# Patient Record
Sex: FEMALE | Race: WHITE | NOT HISPANIC OR LATINO | Employment: OTHER | URBAN - METROPOLITAN AREA
[De-identification: names, ages, dates, MRNs, and addresses within clinical notes are randomized per-mention and may not be internally consistent; named-entity substitution may affect disease eponyms.]

---

## 2022-04-25 NOTE — PROGRESS NOTES
4/27/2022      Chief Complaint   Patient presents with    Urinary Tract Infection     unable to void went before they came      Assessment and Plan    64 y o  female new patient     1  Recurrent UTIs  - 1 positive urine culture on file over the past year growing E Coli  - Recommend proper hydration, avoiding constipation, cranberry supplementation, and probiotics prevent UTIs  - Will start topical vaginal estrogen cream  - Standing urine culture orders placed  - Obtain US kidney/bladder  - If ongoing recurrent UTIs consider Hiprex versus daily low-dose antibiotic  2  Neurogenic bladder   3  Multiple sclerosis  - recommend avoiding bladder irritants, bowel regimen to prevent constipation, and Kegel exercises  - consider trial of Myrbetriq or alternative anticholinergic if bothersome symptoms   - Bladder scan today was 15 mL     History of Present Illness  Remington Preciado is a 64 y o  female here for new patient evaluation of recurrent UTIs  Per chart review she has only had 1 positive urine culture growing E coli over the past year  She has had at least 2 UTIs this year per her report  She recently just finished course of Macrobid for UTI  She reports resolution of her UTI symptoms which are typically dysuria  She denies ever being hospitalized for urinary tract infection  She has history of pulmonary embolism, psoriasis, neurogenic bladder, multiple sclerosis, hyperlipidemia, lung cancer, trigeminal neuralgia, gait dysfunction  She previously was managed on oxybutynin for urgency and frequency symptoms which are believed to be due to neurogenic bladder secondary to her multiple sclerosis  She reports the oxybutynin was ineffective  Patient reports she will occasionally have intermittent urinary urgency and frequency however this is not a daily occurrence  Occasionally she will have leakage associated with the urgency  She does have chronic constipation issues    She previously had seen urologist several years ago and underwent cystoscopy which was negative per her report  She denies any prior  surgical manipulation  Denies any family history of  malignancy  She is a former smoker  Review of Systems   Constitutional: Negative for chills and fever  Respiratory: Negative for shortness of breath  Cardiovascular: Negative for chest pain  Gastrointestinal: Positive for constipation  Negative for abdominal pain  Genitourinary: Positive for frequency and urgency  Negative for difficulty urinating, dysuria, flank pain, hematuria and pelvic pain  Neurological: Negative for dizziness  Past Medical History  Past Medical History:   Diagnosis Date    [facility])     only has the left lung     [facility]        Past Social History  Past Surgical History:   Procedure Laterality Date    LUNG REMOVAL, TOTAL      right      Social History     Tobacco Use   Smoking Status Former Smoker   Smokeless Tobacco Never Used       Past Family History  Family History   Problem Relation Age of Onset    No Known Problems Father     No Known Problems Mother        Past Social history  Social History     Socioeconomic History    Marital status:       Spouse name: Not on file    Number of children: Not on file    Years of education: Not on file    Highest education level: Not on file   Occupational History    Not on file   Tobacco Use    Smoking status: Former Smoker    Smokeless tobacco: Never Used   Vaping Use    Vaping Use: Never used   Substance and Sexual Activity    Alcohol use: Not Currently    Drug use: Not on file    Sexual activity: Not on file   Other Topics Concern    Not on file   Social History Narrative    Not on file     Social Determinants of Health     Financial Resource Strain: Not on file   Food Insecurity: Not on file   Transportation Needs: Not on file   Physical Activity: Not on file   Stress: Not on file   Social Connections: Not on file Intimate Partner Violence: Not on file   Housing Stability: Not on file       Current Medications  Current Outpatient Medications   Medication Sig Dispense Refill    albuterol (PROVENTIL HFA,VENTOLIN HFA) 90 mcg/act inhaler Inhale 2 puffs every 6 (six) hours as needed      aspirin (ECOTRIN LOW STRENGTH) 81 mg EC tablet Take 81 mg by mouth daily      carBAMazepine (TEGretol) 200 mg tablet Take 1 tablet by mouth 2 (two) times a day      Cholecalciferol 50 MCG (2000 UT) CAPS Take 2 capsules by mouth daily      cyanocobalamin (VITAMIN B-12) 100 MCG tablet Take 100 mcg by mouth daily      betamethasone, augmented, (DIPROLENE) 0 05 % ointment as needed  (Patient not taking: Reported on 4/27/2022)      nitrofurantoin (MACROBID) 100 mg capsule Take 100 mg by mouth 2 (two) times a day (Patient not taking: Reported on 4/27/2022 )       No current facility-administered medications for this visit  Allergies  No Known Allergies      The following portions of the patient's history were reviewed and updated as appropriate: allergies, current medications, past medical history, past social history, past surgical history and problem list       Vitals  Vitals:    04/27/22 1400   BP: 120/78   BP Location: Left arm   Patient Position: Sitting   Cuff Size: Large   Pulse: 72   Resp: 20   SpO2: 98%   Weight: 65 9 kg (145 lb 3 2 oz)   Height: 5' 5" (1 651 m)           Physical Exam  Physical Exam  Constitutional:       Appearance: Normal appearance  She is normal weight  HENT:      Head: Normocephalic and atraumatic  Right Ear: External ear normal       Left Ear: External ear normal    Eyes:      General: No scleral icterus  Conjunctiva/sclera: Conjunctivae normal    Cardiovascular:      Pulses: Normal pulses  Pulmonary:      Effort: Pulmonary effort is normal    Musculoskeletal:      Cervical back: Normal range of motion  Comments: Ambulates with cane   Neurological:      General: No focal deficit present  Mental Status: She is alert and oriented to person, place, and time  Psychiatric:         Mood and Affect: Mood normal          Behavior: Behavior normal          Thought Content:  Thought content normal          Judgment: Judgment normal            Results  Recent Results (from the past 1 hour(s))   POCT Measure PVR    Collection Time: 04/27/22  2:06 PM   Result Value Ref Range    POST-VOID RESIDUAL VOLUME, ML POC 15 mL   ]  No results found for: PSA  No results found for: GLUCOSE, CALCIUM, NA, K, CO2, CL, BUN, CREATININE  No results found for: WBC, HGB, HCT, MCV, PLT        Orders  Orders Placed This Encounter   Procedures    POCT Measure PVR       Hilaria Mercer PA-C

## 2022-04-27 ENCOUNTER — OFFICE VISIT (OUTPATIENT)
Dept: UROLOGY | Facility: CLINIC | Age: 77
End: 2022-04-27
Payer: COMMERCIAL

## 2022-04-27 VITALS
DIASTOLIC BLOOD PRESSURE: 78 MMHG | RESPIRATION RATE: 20 BRPM | WEIGHT: 145.2 LBS | SYSTOLIC BLOOD PRESSURE: 120 MMHG | BODY MASS INDEX: 24.19 KG/M2 | HEART RATE: 72 BPM | OXYGEN SATURATION: 98 % | HEIGHT: 65 IN

## 2022-04-27 DIAGNOSIS — N39.0 URINARY TRACT INFECTION WITHOUT HEMATURIA, SITE UNSPECIFIED: Primary | ICD-10-CM

## 2022-04-27 DIAGNOSIS — N31.9 NEUROGENIC BLADDER: ICD-10-CM

## 2022-04-27 DIAGNOSIS — N39.0 FREQUENT UTI: ICD-10-CM

## 2022-04-27 LAB — POST-VOID RESIDUAL VOLUME, ML POC: 15 ML

## 2022-04-27 PROCEDURE — 51798 US URINE CAPACITY MEASURE: CPT | Performed by: PHYSICIAN ASSISTANT

## 2022-04-27 PROCEDURE — 99204 OFFICE O/P NEW MOD 45 MIN: CPT | Performed by: PHYSICIAN ASSISTANT

## 2022-04-27 RX ORDER — ASPIRIN 81 MG/1
81 TABLET ORAL DAILY
COMMUNITY

## 2022-04-27 RX ORDER — NITROFURANTOIN 25; 75 MG/1; MG/1
100 CAPSULE ORAL 2 TIMES DAILY
COMMUNITY
Start: 2022-04-22 | End: 2022-04-27

## 2022-04-27 RX ORDER — CARBAMAZEPINE 200 MG/1
1 TABLET ORAL 2 TIMES DAILY
COMMUNITY
Start: 2022-03-11

## 2022-04-27 RX ORDER — ESTRADIOL 0.1 MG/G
CREAM VAGINAL
Qty: 42.5 G | Refills: 2 | Status: SHIPPED | OUTPATIENT
Start: 2022-04-27

## 2022-04-27 RX ORDER — ALBUTEROL SULFATE 90 UG/1
2 AEROSOL, METERED RESPIRATORY (INHALATION) EVERY 6 HOURS PRN
COMMUNITY
Start: 2022-03-11

## 2022-04-27 RX ORDER — BETAMETHASONE DIPROPIONATE 0.5 MG/G
OINTMENT TOPICAL
COMMUNITY

## 2022-06-14 ENCOUNTER — APPOINTMENT (OUTPATIENT)
Dept: LAB | Facility: HOSPITAL | Age: 77
End: 2022-06-14
Payer: COMMERCIAL

## 2022-06-14 PROCEDURE — 87086 URINE CULTURE/COLONY COUNT: CPT | Performed by: PHYSICIAN ASSISTANT

## 2022-06-15 DIAGNOSIS — N39.0 URINARY TRACT INFECTION WITHOUT HEMATURIA, SITE UNSPECIFIED: Primary | ICD-10-CM

## 2022-06-15 RX ORDER — CEPHALEXIN 500 MG/1
500 CAPSULE ORAL EVERY 12 HOURS SCHEDULED
Qty: 14 CAPSULE | Refills: 0 | Status: SHIPPED | OUTPATIENT
Start: 2022-06-15 | End: 2022-06-22

## 2022-06-16 ENCOUNTER — TELEPHONE (OUTPATIENT)
Dept: UROLOGY | Facility: CLINIC | Age: 77
End: 2022-06-16

## 2022-06-16 LAB — BACTERIA UR CULT: NORMAL

## 2022-06-16 NOTE — TELEPHONE ENCOUNTER
----- Message from Jeffrey Tilley PA-C sent at 6/16/2022 11:18 AM EDT -----  Final urine cultures only showing mixed contaminants  Typically we do not treat this with antibiotics  If she is highly symptomatic she can take antibiotics  Otherwise I would not utilize this prescription

## 2022-06-16 NOTE — TELEPHONE ENCOUNTER
CC not in chart   Left generic VM     When patient returns call inform her antibiotics have been sent to her pharmacy     Office to monitor for final cultures

## 2022-06-16 NOTE — TELEPHONE ENCOUNTER
Spoke with patient, advised results note  Patient states she is symptomatic and will  antibiotics  Asked her to keep office updated early next week on her symptoms  She verbalized understanding

## 2022-06-16 NOTE — TELEPHONE ENCOUNTER
----- Message from Kasia Sol PA-C sent at 6/15/2022  5:04 PM EDT -----  Preliminary culture positive  Keflex sent to pharmacy  Will monitor for final culture results and may need to alter antibiotic based off of sensitivities

## 2022-06-20 ENCOUNTER — HOSPITAL ENCOUNTER (EMERGENCY)
Facility: HOSPITAL | Age: 77
Discharge: HOME | End: 2022-06-20
Attending: EMERGENCY MEDICINE
Payer: COMMERCIAL

## 2022-06-20 ENCOUNTER — APPOINTMENT (EMERGENCY)
Dept: CT IMAGING | Facility: HOSPITAL | Age: 77
End: 2022-06-20
Payer: COMMERCIAL

## 2022-06-20 VITALS
HEART RATE: 92 BPM | SYSTOLIC BLOOD PRESSURE: 173 MMHG | DIASTOLIC BLOOD PRESSURE: 72 MMHG | RESPIRATION RATE: 26 BRPM | TEMPERATURE: 97.7 F | OXYGEN SATURATION: 97 %

## 2022-06-20 DIAGNOSIS — M54.9 BACK PAIN: Primary | ICD-10-CM

## 2022-06-20 LAB
2HR DELTA HS TROPONIN: 4 NG/L
ALBUMIN SERPL BCP-MCNC: 3.3 G/DL (ref 3.5–5)
ALP SERPL-CCNC: 90 U/L (ref 46–116)
ALT SERPL W P-5'-P-CCNC: 12 U/L (ref 12–78)
ANION GAP SERPL CALCULATED.3IONS-SCNC: 8 MMOL/L (ref 4–13)
AST SERPL W P-5'-P-CCNC: 12 U/L (ref 5–45)
ATRIAL RATE: 85 BPM
BASOPHILS # BLD AUTO: 0.05 THOUSANDS/ΜL (ref 0–0.1)
BASOPHILS NFR BLD AUTO: 1 % (ref 0–1)
BILIRUB SERPL-MCNC: 0.39 MG/DL (ref 0.2–1)
BUN SERPL-MCNC: 18 MG/DL (ref 5–25)
CALCIUM ALBUM COR SERPL-MCNC: 9.2 MG/DL (ref 8.3–10.1)
CALCIUM SERPL-MCNC: 8.6 MG/DL (ref 8.3–10.1)
CARDIAC TROPONIN I PNL SERPL HS: 10 NG/L
CARDIAC TROPONIN I PNL SERPL HS: 6 NG/L
CHLORIDE SERPL-SCNC: 105 MMOL/L (ref 100–108)
CO2 SERPL-SCNC: 30 MMOL/L (ref 21–32)
CREAT SERPL-MCNC: 0.86 MG/DL (ref 0.6–1.3)
EOSINOPHIL # BLD AUTO: 0.15 THOUSAND/ΜL (ref 0–0.61)
EOSINOPHIL NFR BLD AUTO: 2 % (ref 0–6)
ERYTHROCYTE [DISTWIDTH] IN BLOOD BY AUTOMATED COUNT: 14.2 % (ref 11.6–15.1)
GFR SERPL CREATININE-BSD FRML MDRD: 64 ML/MIN/1.73SQ M
GLUCOSE SERPL-MCNC: 91 MG/DL (ref 65–140)
HCT VFR BLD AUTO: 36.4 % (ref 34.8–46.1)
HGB BLD-MCNC: 11.9 G/DL (ref 11.5–15.4)
IMM GRANULOCYTES # BLD AUTO: 0.02 THOUSAND/UL (ref 0–0.2)
IMM GRANULOCYTES NFR BLD AUTO: 0 % (ref 0–2)
LIPASE SERPL-CCNC: 150 U/L (ref 73–393)
LYMPHOCYTES # BLD AUTO: 1.19 THOUSANDS/ΜL (ref 0.6–4.47)
LYMPHOCYTES NFR BLD AUTO: 18 % (ref 14–44)
MCH RBC QN AUTO: 31.9 PG (ref 26.8–34.3)
MCHC RBC AUTO-ENTMCNC: 32.7 G/DL (ref 31.4–37.4)
MCV RBC AUTO: 98 FL (ref 82–98)
MONOCYTES # BLD AUTO: 0.48 THOUSAND/ΜL (ref 0.17–1.22)
MONOCYTES NFR BLD AUTO: 7 % (ref 4–12)
NEUTROPHILS # BLD AUTO: 4.56 THOUSANDS/ΜL (ref 1.85–7.62)
NEUTS SEG NFR BLD AUTO: 72 % (ref 43–75)
NRBC BLD AUTO-RTO: 0 /100 WBCS
P AXIS: 60 DEGREES
PLATELET # BLD AUTO: 237 THOUSANDS/UL (ref 149–390)
PMV BLD AUTO: 9.6 FL (ref 8.9–12.7)
POTASSIUM SERPL-SCNC: 3.5 MMOL/L (ref 3.5–5.3)
PR INTERVAL: 168 MS
PROT SERPL-MCNC: 6.9 G/DL (ref 6.4–8.2)
QRS AXIS: 65 DEGREES
QRSD INTERVAL: 80 MS
QT INTERVAL: 364 MS
QTC INTERVAL: 433 MS
RBC # BLD AUTO: 3.73 MILLION/UL (ref 3.81–5.12)
SODIUM SERPL-SCNC: 143 MMOL/L (ref 136–145)
T WAVE AXIS: 67 DEGREES
VENTRICULAR RATE: 85 BPM
WBC # BLD AUTO: 6.45 THOUSAND/UL (ref 4.31–10.16)

## 2022-06-20 PROCEDURE — 99285 EMERGENCY DEPT VISIT HI MDM: CPT | Performed by: EMERGENCY MEDICINE

## 2022-06-20 PROCEDURE — 36415 COLL VENOUS BLD VENIPUNCTURE: CPT | Performed by: EMERGENCY MEDICINE

## 2022-06-20 PROCEDURE — 80053 COMPREHEN METABOLIC PANEL: CPT | Performed by: EMERGENCY MEDICINE

## 2022-06-20 PROCEDURE — 93010 ELECTROCARDIOGRAM REPORT: CPT | Performed by: INTERNAL MEDICINE

## 2022-06-20 PROCEDURE — 99284 EMERGENCY DEPT VISIT MOD MDM: CPT

## 2022-06-20 PROCEDURE — 85025 COMPLETE CBC W/AUTO DIFF WBC: CPT | Performed by: EMERGENCY MEDICINE

## 2022-06-20 PROCEDURE — 84484 ASSAY OF TROPONIN QUANT: CPT | Performed by: EMERGENCY MEDICINE

## 2022-06-20 PROCEDURE — 93005 ELECTROCARDIOGRAM TRACING: CPT

## 2022-06-20 PROCEDURE — 83690 ASSAY OF LIPASE: CPT | Performed by: EMERGENCY MEDICINE

## 2022-06-20 PROCEDURE — 71250 CT THORAX DX C-: CPT

## 2022-06-20 NOTE — DISCHARGE INSTRUCTIONS
Follow up with your doctor for recheck, take tylenol for pain and return to ED for worsening   Follow up with primary care for left sided lung nodule as you may need a repeat CT scan in 12 months

## 2022-06-20 NOTE — ED PROVIDER NOTES
History  Chief Complaint   Patient presents with    Back Pain     Pt c/o upper right back pain since yesterday   Denies injury  Unrelieved by OTC medications  HPI   10-year-old female with past medical history of lung cancer status post right pneumonectomy presents with right upper back pain that started yesterday  Patient was helping move garden tools yesterday  She states that pain is sharp, lasts a few seconds to minutes and then resolves  Nothing makes better or worse  No chest pain or shortness of breath  No cough, fevers or chills  Prior to Admission Medications   Prescriptions Last Dose Informant Patient Reported? Taking? Cholecalciferol 50 MCG (2000 UT) CAPS   Yes No   Sig: Take 2 capsules by mouth daily   albuterol (PROVENTIL HFA,VENTOLIN HFA) 90 mcg/act inhaler   Yes No   Sig: Inhale 2 puffs every 6 (six) hours as needed   aspirin (ECOTRIN LOW STRENGTH) 81 mg EC tablet   Yes No   Sig: Take 81 mg by mouth daily   betamethasone, augmented, (DIPROLENE) 0 05 % ointment   Yes No   Sig: as needed     Patient not taking: Reported on 4/27/2022   carBAMazepine (TEGretol) 200 mg tablet   Yes No   Sig: Take 1 tablet by mouth 2 (two) times a day   cephalexin (KEFLEX) 500 mg capsule   No No   Sig: Take 1 capsule (500 mg total) by mouth every 12 (twelve) hours for 7 days   cyanocobalamin (VITAMIN B-12) 100 MCG tablet   Yes No   Sig: Take 100 mcg by mouth daily   estradiol (ESTRACE VAGINAL) 0 1 mg/g vaginal cream   No No   Sig: Apply pea sized amount around urethra and inside vaginal opening 3 times per week      Facility-Administered Medications: None       Past Medical History:   Diagnosis Date    [facility]     only has the left lung     [facility] sclerosis) ([facility] )        Past Surgical History:   Procedure Laterality Date    LUNG REMOVAL, TOTAL      right        Family History   Problem Relation Age of Onset    No Known Problems Father     No Known Problems Mother      I have reviewed and agree with the history as documented  E-Cigarette/Vaping    E-Cigarette Use Never User      E-Cigarette/Vaping Substances    Nicotine No     THC No     CBD No     Flavoring No     Other No     Unknown No      Social History     Tobacco Use    Smoking status: Former Smoker    Smokeless tobacco: Never Used   Vaping Use    Vaping Use: Never used   Substance Use Topics    Alcohol use: Not Currently       Review of Systems   Constitutional: Negative for chills and fever  HENT: Negative for dental problem and ear pain  Eyes: Negative for pain and redness  Respiratory: Negative for cough and shortness of breath  Cardiovascular: Negative for chest pain and palpitations  Gastrointestinal: Negative for abdominal pain and nausea  Endocrine: Negative for polydipsia and polyphagia  Genitourinary: Negative for dysuria and frequency  Musculoskeletal: Positive for back pain  Negative for arthralgias and joint swelling  Skin: Negative for color change and rash  Neurological: Negative for dizziness and headaches  Psychiatric/Behavioral: Negative for behavioral problems and confusion  All other systems reviewed and are negative  Physical Exam  Physical Exam  Vitals and nursing note reviewed  Constitutional:       General: She is not in acute distress  Appearance: She is well-developed  She is not diaphoretic  HENT:      Head: Atraumatic  Right Ear: External ear normal       Left Ear: External ear normal       Nose: Nose normal    Eyes:      Conjunctiva/sclera: Conjunctivae normal       Pupils: Pupils are equal, round, and reactive to light  Neck:      Vascular: No JVD  Cardiovascular:      Rate and Rhythm: Normal rate and regular rhythm  Heart sounds: Normal heart sounds  No murmur heard  Pulmonary:      Effort: Pulmonary effort is normal  No respiratory distress  Breath sounds: No wheezing        Comments: Absent breath sounds on right  Abdominal:      General: Bowel sounds are normal  There is no distension  Palpations: Abdomen is soft  Tenderness: There is no abdominal tenderness  Musculoskeletal:         General: Normal range of motion  Cervical back: Normal range of motion and neck supple  Comments: Tender to palpation right paraspinal thoracic   Skin:     General: Skin is warm and dry  Capillary Refill: Capillary refill takes less than 2 seconds  Neurological:      Mental Status: She is alert and oriented to person, place, and time  Cranial Nerves: No cranial nerve deficit     Psychiatric:         Behavior: Behavior normal          Vital Signs  ED Triage Vitals [06/20/22 0650]   Temperature Pulse Respirations Blood Pressure SpO2   97 7 °F (36 5 °C) 91 16 (!) 192/89 98 %      Temp Source Heart Rate Source Patient Position - Orthostatic VS BP Location FiO2 (%)   Oral Monitor Sitting Left arm --      Pain Score       --           Vitals:    06/20/22 0853 06/20/22 0900 06/20/22 1100 06/20/22 1115   BP: 149/70 146/71 (!) 173/72 (!) 173/72   Pulse: 88 87 92    Patient Position - Orthostatic VS: Sitting  Sitting Sitting         Visual Acuity      ED Medications  Medications - No data to display    Diagnostic Studies  Results Reviewed     Procedure Component Value Units Date/Time    HS Troponin I 2hr [[id number]]  (Normal) Collected: 06/20/22 1000    Lab Status: Final result Specimen: Blood from Arm, Left Updated: 06/20/22 1034     hs TnI 2hr 10 ng/L      Delta 2hr hsTnI 4 ng/L     HS Troponin 0hr (reflex protocol) [[id number]]  (Normal) Collected: 06/20/22 0723    Lab Status: Final result Specimen: Blood from Arm, Left Updated: 06/20/22 0809     hs TnI 0hr 6 ng/L     Lipase [[id number]]  (Normal) Collected: 06/20/22 0723    Lab Status: Final result Specimen: Blood from Arm, Left Updated: 06/20/22 0755     Lipase 150 u/L     Comprehensive metabolic panel [[id number]]  (Abnormal) Collected: 06/20/22 0723    Lab Status: Final result Specimen: Blood from Arm, Left Updated: 06/20/22 0755     Sodium 143 mmol/L      Potassium 3 5 mmol/L      Chloride 105 mmol/L      CO2 30 mmol/L      ANION GAP 8 mmol/L      BUN 18 mg/dL      Creatinine 0 86 mg/dL      Glucose 91 mg/dL      Calcium 8 6 mg/dL      Corrected Calcium 9 2 mg/dL      AST 12 U/L      ALT 12 U/L      Alkaline Phosphatase 90 U/L      Total Protein 6 9 g/dL      Albumin 3 3 g/dL      Total Bilirubin 0 39 mg/dL      eGFR 64 ml/min/1 73sq m     Narrative:      [place] guidelines for Chronic Kidney Disease (CKD):     Stage 1 with normal or high GFR (GFR > 90 mL/min/1 73 square meters)    Stage 2 Mild CKD (GFR = 60-89 mL/min/1 73 square meters)    Stage 3A Moderate CKD (GFR = 45-59 mL/min/1 73 square meters)    Stage 3B Moderate CKD (GFR = 30-44 mL/min/1 73 square meters)    Stage 4 Severe CKD (GFR = 15-29 mL/min/1 73 square meters)    Stage 5 End Stage CKD (GFR <15 mL/min/1 73 square meters)  Note: GFR calculation is accurate only with a steady state creatinine    CBC and differential [[id number]]  (Abnormal) Collected: 06/20/22 0723    Lab Status: Final result Specimen: Blood from Arm, Left Updated: 06/20/22 0740     WBC 6 45 Thousand/uL      RBC 3 73 Million/uL      Hemoglobin 11 9 g/dL      Hematocrit 36 4 %      MCV 98 fL      MCH 31 9 pg      MCHC 32 7 g/dL      RDW 14 2 %      MPV 9 6 fL      Platelets 781 Thousands/uL      nRBC 0 /100 WBCs      Neutrophils Relative 72 %      Immat GRANS % 0 %      Lymphocytes Relative 18 %      Monocytes Relative 7 %      Eosinophils Relative 2 %      Basophils Relative 1 %      Neutrophils Absolute 4 56 Thousands/µL      Immature Grans Absolute 0 02 Thousand/uL      Lymphocytes Absolute 1 19 Thousands/µL      Monocytes Absolute 0 48 Thousand/µL      Eosinophils Absolute 0 15 Thousand/µL      Basophils Absolute 0 05 Thousands/µL                  CT chest without contrast   Final Result by Saeed Cohen MD (06/20 2113)      Marked right-sided volume loss status post right pneumonectomy  There is pleural fluid on the right containing septations  Coronary artery and aortic calcifications  3 mm pulmonary nodule left upper lobe  Correlation with any prior studies advised  Otherwise, consider short-term follow-up in 12 months  Rim calcified thyroid nodule  Small hiatal hernia  Bilateral renal cysts  Colonic diverticulosis  Workstation performed: [id number]                    Procedures  ECG 12 Lead Documentation Only    Date/Time: 6/20/2022 8:02 AM  Performed by: Vickie Garcia MD  Authorized by: Vickie Garcia MD     Comments: In sinus rhythm rate of 85, normal axis and intervals, no acute ST elevations or depressions             ED Course                                             MDM  Patient presents with right upper back pain  Pain is well-localized, not tearing pain, pulses equal bilaterally doubt dissection  CT shows no acute abnormality, discussed lung nodule and need for follow up  Labs are unremarkable and reassuring  Disposition  Final diagnoses:   Back pain     Time reflects when diagnosis was documented in both MDM as applicable and the Disposition within this note     Time User Action Codes Description Comment    6/20/2022  9:22 AM Rita Hebert Add [M54 9] Back pain       ED Disposition     ED Disposition   Discharge    Condition   Stable    Date/Time   Mon Jun 20, 2022  9:22 AM    Comment   Jason Nascimento discharge to home/self care                 Follow-up Information     Follow up With Specialties Details Why Contact Info    Rajani Morales MD Internal Medicine Call   [address] [place] [address]  [phone]            Discharge Medication List as of 6/20/2022 10:57 AM      CONTINUE these medications which have NOT CHANGED    Details   albuterol (PROVENTIL HFA,VENTOLIN HFA) 90 mcg/act inhaler Inhale 2 puffs every 6 (six) hours as needed, Starting Fri 3/11/2022, Historical Med aspirin (ECOTRIN LOW STRENGTH) 81 mg EC tablet Take 81 mg by mouth daily, Historical Med      betamethasone, augmented, (DIPROLENE) 0 05 % ointment as needed , Historical Med      carBAMazepine (TEGretol) 200 mg tablet Take 1 tablet by mouth 2 (two) times a day, Starting Fri 3/11/2022, Historical Med      cephalexin (KEFLEX) 500 mg capsule Take 1 capsule (500 mg total) by mouth every 12 (twelve) hours for 7 days, Starting Wed 6/15/2022, Until Wed 6/22/2022, Normal      Cholecalciferol 50 MCG (2000 UT) CAPS Take 2 capsules by mouth daily, Historical Med      cyanocobalamin (VITAMIN B-12) 100 MCG tablet Take 100 mcg by mouth daily, Historical Med      estradiol (ESTRACE VAGINAL) 0 1 mg/g vaginal cream Apply pea sized amount around urethra and inside vaginal opening 3 times per week, Normal             No discharge procedures on file      PDMP Review     None          ED Provider  Electronically Signed by           Le Rich MD  06/20/22 9527

## 2022-07-11 ENCOUNTER — HOSPITAL ENCOUNTER (OUTPATIENT)
Dept: ULTRASOUND IMAGING | Facility: HOSPITAL | Age: 77
Discharge: HOME | End: 2022-07-11
Payer: COMMERCIAL

## 2022-07-11 DIAGNOSIS — N39.0 FREQUENT UTI: ICD-10-CM

## 2022-07-11 DIAGNOSIS — N31.9 NEUROGENIC BLADDER: ICD-10-CM

## 2022-07-11 PROCEDURE — 76770 US EXAM ABDO BACK WALL COMP: CPT

## 2022-08-01 ENCOUNTER — APPOINTMENT (OUTPATIENT)
Dept: LAB | Facility: HOSPITAL | Age: 77
End: 2022-08-01
Payer: COMMERCIAL

## 2022-08-03 DIAGNOSIS — N39.0 URINARY TRACT INFECTION WITHOUT HEMATURIA, SITE UNSPECIFIED: Primary | ICD-10-CM

## 2022-08-03 RX ORDER — SULFAMETHOXAZOLE AND TRIMETHOPRIM 800; 160 MG/1; MG/1
1 TABLET ORAL EVERY 12 HOURS SCHEDULED
Qty: 14 TABLET | Refills: 0 | Status: SHIPPED | OUTPATIENT
Start: 2022-08-03 | End: 2022-08-10

## 2022-08-08 ENCOUNTER — OFFICE VISIT (OUTPATIENT)
Dept: UROLOGY | Facility: CLINIC | Age: 77
End: 2022-08-08
Payer: COMMERCIAL

## 2022-08-08 VITALS
SYSTOLIC BLOOD PRESSURE: 122 MMHG | HEIGHT: 65 IN | OXYGEN SATURATION: 99 % | DIASTOLIC BLOOD PRESSURE: 74 MMHG | BODY MASS INDEX: 23.49 KG/M2 | WEIGHT: 141 LBS | HEART RATE: 84 BPM

## 2022-08-08 DIAGNOSIS — N31.9 NEUROGENIC BLADDER: ICD-10-CM

## 2022-08-08 DIAGNOSIS — N28.1 RENAL CYST: Primary | ICD-10-CM

## 2022-08-08 PROCEDURE — 99214 OFFICE O/P EST MOD 30 MIN: CPT | Performed by: PHYSICIAN ASSISTANT

## 2022-08-08 NOTE — PROGRESS NOTES
8/8/2022      Chief Complaint   Patient presents with    Follow-up     Assessment and Plan    1  Recurrent UTIs  - 1 positive urine culture since last visit on 08/01/2022 growing E coli, currently on Bactrim  She reports she was asymptomatic when urine culture was obtained  I advised that she should not obtain urine testing if asymptomatic and is not recommended to treat asymptomatic bacteriuria  I advised in the future she only obtain urine testing if symptomatic for UTI  - continue topical vaginal estrogen cream  - recommend continued proper hydration, avoiding constipation, cranberry supplementation, and probiotics for UTI prevention  2  Left renal cyst  - ultrasound of kidney and bladder from 07/11/2022 showed a 2 1 cm septated left renal cyst  - obtain ultrasound in 1 year to ensure stability    2  Neurogenic bladder   3  Multiple sclerosis  - Recommend avoiding bladder irritants, bowel regimen to prevent constipation, and Kegel exercises  - will trial Myrbetriq  - Demonstrated adequate bladder emptying at last visit with PVR of 15 mL    - follow-up in 2-3 months for symptom reassessment  History of Present Illness  Nette Fang is a 64 y o  female here for follow up evaluation of  recurrent UTIs  She has had 1 positive urine culture growing E coli since last visit  She is currently on course of Bactrim for this  She states she was asymptomatic for UTI when urine testing was obtained  Currently denies any urinary complaints  An ultrasound of the kidneys and bladder was obtained which was unremarkable other than any small septated left renal cyst     She additionally has urinary urgency and frequency issues  Previously had been managed on oxybutynin which was ineffective  She does have history of multiple sclerosis and probable neurogenic bladder  Occasionally she will have leakage associated with urgency  She does have chronic constipation issues    She had seen urologist several years ago and underwent cystoscopy which was negative per her report  No prior history of  surgical manipulation  No family history of  malignancy  She is a former smoker  Review of Systems   Constitutional: Negative for chills and fever  Respiratory: Negative for shortness of breath  Cardiovascular: Negative for chest pain  Gastrointestinal: Negative for abdominal pain  Genitourinary: Positive for frequency and urgency  Negative for difficulty urinating, dysuria, flank pain, hematuria and pelvic pain  Neurological: Negative for dizziness  Past Medical History  Past Medical History:   Diagnosis Date    Cancer [facility])     only has the left lung     MS (multiple sclerosis) ([facility] [address] )        Past Social History  Past Surgical History:   Procedure Laterality Date    LUNG REMOVAL, TOTAL      right      Social History     Tobacco Use   Smoking Status Former Smoker   Smokeless Tobacco Never Used       Past Family History  Family History   Problem Relation Age of Onset    Cirrhosis Father     No Known Problems Mother     No Known Problems Daughter     No Known Problems Daughter        Past Social history  Social History     Socioeconomic History    Marital status:      Spouse name: Not on file    Number of children: Not on file    Years of education: Not on file    Highest education level: Not on file   Occupational History    Not on file   Tobacco Use    Smoking status: Former Smoker    Smokeless tobacco: Never Used   Vaping Use    Vaping Use: Never used   Substance and Sexual Activity    Alcohol use: Yes     Comment: Occ      Drug use: Never    Sexual activity: Not on file   Other Topics Concern    Not on file   Social History Narrative    Not on file     Social Determinants of Health     Financial Resource Strain: Not on file   Food Insecurity: Not on file   Transportation Needs: Not on file   Physical Activity: Not on file   Stress: Not on file   Social Connections: Not on file Intimate Partner Violence: Not on file   Housing Stability: Not on file       Current Medications  Current Outpatient Medications   Medication Sig Dispense Refill    albuterol (PROVENTIL HFA,VENTOLIN HFA) 90 mcg/act inhaler Inhale 2 puffs every 6 (six) hours as needed      aspirin (ECOTRIN LOW STRENGTH) 81 mg EC tablet Take 81 mg by mouth daily      carBAMazepine (TEGretol) 200 mg tablet Take 1 tablet by mouth 2 (two) times a day      Cholecalciferol 50 MCG (2000 UT) CAPS Take 2 capsules by mouth daily      cyanocobalamin (VITAMIN B-12) 100 MCG tablet Take 100 mcg by mouth daily      estradiol (ESTRACE VAGINAL) 0 1 mg/g vaginal cream Apply pea sized amount around urethra and inside vaginal opening 3 times per week 42 5 g 2    Mirabegron ER 25 MG TB24 Take 25 mg by mouth daily 30 tablet 2    sulfamethoxazole-trimethoprim (BACTRIM DS) 800-160 mg per tablet Take 1 tablet by mouth every 12 (twelve) hours for 7 days 14 tablet 0    betamethasone, augmented, (DIPROLENE) 0 05 % ointment as needed  (Patient not taking: No sig reported)       No current facility-administered medications for this visit  Allergies  No Known Allergies      The following portions of the patient's history were reviewed and updated as appropriate: allergies, current medications, past medical history, past social history, past surgical history and problem list       Vitals  Vitals:    08/08/22 1049   BP: 122/74   Pulse: 84   SpO2: 99%   Weight: 64 kg (141 lb)   Height: 5' 5" (1 651 m)           Physical Exam  Physical Exam  Constitutional:       Appearance: Normal appearance  HENT:      Head: Normocephalic and atraumatic  Right Ear: External ear normal       Left Ear: External ear normal    Eyes:      General: No scleral icterus  Conjunctiva/sclera: Conjunctivae normal    Cardiovascular:      Pulses: Normal pulses     Pulmonary:      Effort: Pulmonary effort is normal    Musculoskeletal:      Cervical back: Normal range of motion  Comments: Ambulating with canes   Neurological:      General: No focal deficit present  Mental Status: She is alert and oriented to person, place, and time  Psychiatric:         Mood and Affect: Mood normal          Behavior: Behavior normal          Thought Content: Thought content normal          Judgment: Judgment normal            Results  No results found for this or any previous visit (from the past 1 hour(s))  ]  No results found for: PSA  Lab Results   Component Value Date    CALCIUM 8 6 06/20/2022    K 3 5 06/20/2022    CO2 30 06/20/2022     06/20/2022    BUN 18 06/20/2022    CREATININE 0 86 06/20/2022     Lab Results   Component Value Date    WBC 6 45 06/20/2022    HGB 11 9 06/20/2022    HCT 36 4 06/20/2022    MCV 98 06/20/2022     06/20/2022           Orders  Orders Placed This Encounter   Procedures    US kidney and bladder     Standing Status:   Future     Standing Expiration Date:   8/8/2026     Scheduling Instructions:      "Prep required if being scheduled in conjunction with other studies, refer to those examination's Preps first before scheduling  All patients for US Kidney and Bladder they must drink 24 oz of water 60 minutes before your scheduled appointment time  This test requires you to have a FULL bladder  Please do not urinate before your test             If you have difficulty holding your urine please arrive 30 minutes early to complete your drinking prep  You may take all of your medications for this test             Pediatric Preps-birth to 12 years             Infants and toddlers to 1 year of age- no drinking prep or restrictions             Toddlers (33 year old)- just give liquids , any clear liquid or juice, and hour prior to the exam             3years old and older- drink liquids (approx   16 to 24 oz and no soda) 30 minutes before, try to not let the child void until the test is completed            Please bring your insurance cards, a form of photo ID and a list of yourmedications with you  Arrive 15 minutes prior to your appointment time in order to register  If you need to have lab work or a urinalysis, please do this AFTER your ultrasound  "            To schedule this appointment, please contact Central Scheduling at [phone]  Order Specific Question:   Is a Renal Artery Doppler also being requested in addition to the Kidney/Renal ultrasound ?      Answer:   No       Blossom Eubanks PA-C

## 2022-10-30 DIAGNOSIS — N31.9 NEUROGENIC BLADDER: ICD-10-CM

## 2022-10-31 RX ORDER — MIRABEGRON 25 MG/1
TABLET, FILM COATED, EXTENDED RELEASE ORAL
Qty: 30 TABLET | Refills: 2 | Status: SHIPPED | OUTPATIENT
Start: 2022-10-31

## 2022-11-07 ENCOUNTER — OFFICE VISIT (OUTPATIENT)
Dept: UROLOGY | Facility: CLINIC | Age: 77
End: 2022-11-07

## 2022-11-07 VITALS
SYSTOLIC BLOOD PRESSURE: 126 MMHG | HEIGHT: 65 IN | WEIGHT: 142 LBS | BODY MASS INDEX: 23.66 KG/M2 | OXYGEN SATURATION: 98 % | HEART RATE: 92 BPM | DIASTOLIC BLOOD PRESSURE: 78 MMHG

## 2022-11-07 DIAGNOSIS — N28.1 RENAL CYST: ICD-10-CM

## 2022-11-07 DIAGNOSIS — N31.9 NEUROGENIC BLADDER: ICD-10-CM

## 2022-11-07 DIAGNOSIS — N39.0 FREQUENT UTI: Primary | ICD-10-CM

## 2022-11-07 LAB

## 2022-11-07 RX ORDER — SULFAMETHOXAZOLE AND TRIMETHOPRIM 800; 160 MG/1; MG/1
TABLET ORAL
COMMUNITY
Start: 2022-10-17 | End: 2022-11-07 | Stop reason: ALTCHOICE

## 2022-11-07 RX ORDER — NITROFURANTOIN 25; 75 MG/1; MG/1
CAPSULE ORAL
COMMUNITY
Start: 2022-09-13 | End: 2022-11-07 | Stop reason: ALTCHOICE

## 2022-11-07 RX ORDER — ESTRADIOL 0.1 MG/G
CREAM VAGINAL
Qty: 42.5 G | Refills: 2 | Status: SHIPPED | OUTPATIENT
Start: 2022-11-07

## 2022-11-07 NOTE — PROGRESS NOTES
11/7/2022      Chief Complaint   Patient presents with   • Follow-up   • Frequent UTI     Assessment and Plan    1  Recurrent UTIs  - 1 positive urine culture growing proteus since last visit  - she has not been using Estrace and drinks minimal water throughout the day  Recommend increase compliance with Estrace 3 times weekly and increasing hydration  Recommend continued cranberry supplementation, probiotics, and avoiding constipation  If ongoing recurrent UTIs despite above consider Hiprex versus daily low-dose antibiotic  Urine dip today does show positive blood and leukocytes however negative for nitrates  She is currently asymptomatic for UTI  This has been sent for UA micro and if positive for 3 more RBCs per high-power field recommend cystoscopy and hematuria workup  She had cystoscopy over 5 years ago which was reportedly negative      2  Left renal cyst  - Ultrasound of kidney and bladder from 07/11/2022 showed a 2 1 cm septated left renal cyst  - Obtain ultrasound 1 year from prior to ensure stability     3  Neurogenic bladder   4  Multiple sclerosis  - excellent relief with Myrbetriq   - PVR 15 mL today  - will monitor and contact with urine testing results  History of Present Illness  Leslee Kocher is a 64 y o  female here for follow up evaluation of recurrent UTIs, left renal cyst, and neurogenic bladder  She reports to UTIs since last visit  Per chart review there has only been 1 positive urine culture in September  Urine culture in October was negative  She is managed on topical estrogen cream however reports she has not been using this recently  She also drinks minimal water throughout the day  She has a left renal cyst which is being monitored  She she admitted to urinary frequency and urgency issues at last visit  She has significant history of multiple sclerosis  She previously has failed oxybutynin  She had seen urologist several years ago and underwent cystoscopy which was negative per her report  No prior history of  surgical manipulation  She was started on Myrbetriq at last visit and reports excellent relief of urinary symptoms  She denies any UTI symptoms today  Denies any gross hematuria  Denies any family history of  malignancy  She is a former smoker  Review of Systems   Constitutional: Negative for chills and fever  Respiratory: Negative for shortness of breath  Cardiovascular: Negative for chest pain  Gastrointestinal: Negative for abdominal pain  Genitourinary: Negative for difficulty urinating, dysuria, flank pain, frequency, hematuria, pelvic pain and urgency  Neurological: Negative for dizziness  Past Medical History  Past Medical History:   Diagnosis Date   • Cancer ([facility] )     only has the left lung    • MS (multiple sclerosis) ([facility] )        Past Social History  Past Surgical History:   Procedure Laterality Date   • LUNG REMOVAL, TOTAL      right      Social History     Tobacco Use   Smoking Status Former Smoker   Smokeless Tobacco Never Used       Past Family History  Family History   Problem Relation Age of Onset   • Cirrhosis Father    • No Known Problems Mother    • No Known Problems Daughter    • No Known Problems Daughter        Past Social history  Social History     Socioeconomic History   • Marital status:      Spouse name: Not on file   • Number of children: Not on file   • Years of education: Not on file   • Highest education level: Not on file   Occupational History   • Not on file   Tobacco Use   • Smoking status: Former Smoker   • Smokeless tobacco: Never Used   Vaping Use   • Vaping Use: Never used   Substance and Sexual Activity   • Alcohol use: Yes     Comment: Occ     • Drug use: Never   • Sexual activity: Not on file   Other Topics Concern   • Not on file   Social History Narrative   • Not on file     Social Determinants of Health     Financial Resource Strain: Not on file   Food Insecurity: Not on file Transportation Needs: Not on file   Physical Activity: Not on file   Stress: Not on file   Social Connections: Not on file   Intimate Partner Violence: Not on file   Housing Stability: Not on file       Current Medications  Current Outpatient Medications   Medication Sig Dispense Refill   • albuterol (PROVENTIL HFA,VENTOLIN HFA) 90 mcg/act inhaler Inhale 2 puffs every 6 (six) hours as needed     • aspirin (ECOTRIN LOW STRENGTH) 81 mg EC tablet Take 81 mg by mouth daily     • carBAMazepine (TEGretol) 200 mg tablet Take 1 tablet by mouth 2 (two) times a day     • Cholecalciferol 50 MCG (2000 UT) CAPS Take 2 capsules by mouth daily     • cyanocobalamin (VITAMIN B-12) 100 MCG tablet Take 100 mcg by mouth daily     • Myrbetriq 25 MG TB24 TAKE 1 TABLET BY MOUTH EVERY DAY 30 tablet 2   • betamethasone, augmented, (DIPROLENE) 0 05 % ointment as needed  (Patient not taking: No sig reported)     • estradiol (ESTRACE VAGINAL) 0 1 mg/g vaginal cream Apply pea sized amount around urethra and inside vaginal opening 3 times per week (Patient not taking: Reported on 11/7/2022) 42 5 g 2   • nitrofurantoin (MACROBID) 100 mg capsule TAKE 1 CAPSULE BY MOUTH TWICE A DAY FOR 7 DAYS (Patient not taking: Reported on 11/7/2022)     • sulfamethoxazole-trimethoprim (BACTRIM DS) 800-160 mg per tablet  (Patient not taking: Reported on 11/7/2022)       No current facility-administered medications for this visit  Allergies  No Known Allergies      The following portions of the patient's history were reviewed and updated as appropriate: allergies, current medications, past medical history, past social history, past surgical history and problem list       Vitals  Vitals:    11/07/22 1105   BP: 126/78   Pulse: 92   SpO2: 98%   Weight: 64 4 kg (142 lb)   Height: 5' 5" (1 651 m)           Physical Exam  Physical Exam  Constitutional:       Appearance: Normal appearance  HENT:      Head: Normocephalic and atraumatic        Right Ear: External ear normal       Left Ear: External ear normal    Eyes:      General: No scleral icterus  Conjunctiva/sclera: Conjunctivae normal    Cardiovascular:      Pulses: Normal pulses  Pulmonary:      Effort: Pulmonary effort is normal    Musculoskeletal:         General: Normal range of motion  Cervical back: Normal range of motion  Neurological:      General: No focal deficit present  Mental Status: She is alert and oriented to person, place, and time  Psychiatric:         Mood and Affect: Mood normal          Behavior: Behavior normal          Thought Content:  Thought content normal          Judgment: Judgment normal            Results  Recent Results (from the past 1 hour(s))   POCT urine dip    Collection Time: 11/07/22 11:07 AM   Result Value Ref Range    LEUKOCYTE ESTERASE,UA +++     NITRITE,UA -     SL AMB POCT UROBILINOGEN 0 2     POCT URINE PROTEIN trace      PH,UA 5 0     BLOOD,UA moderate     SPECIFIC GRAVITY,UA 1 020     KETONES,UA -     BILIRUBIN,UA -     GLUCOSE, UA -      COLOR,UA Yellow     CLARITY,UA Clear    POCT Measure PVR    Collection Time: 11/07/22 11:09 AM   Result Value Ref Range    POST-VOID RESIDUAL VOLUME, ML POC 15 mL   ]  No results found for: PSA  Lab Results   Component Value Date    CALCIUM 8 6 06/20/2022    K 3 5 06/20/2022    CO2 30 06/20/2022     06/20/2022    BUN 18 06/20/2022    CREATININE 0 86 06/20/2022     Lab Results   Component Value Date    WBC 6 45 06/20/2022    HGB 11 9 06/20/2022    HCT 36 4 06/20/2022    MCV 98 06/20/2022     06/20/2022           Orders  Orders Placed This Encounter   Procedures   • POCT Measure PVR   • POCT urine dip       Sandor Parish

## 2022-11-08 ENCOUNTER — TELEPHONE (OUTPATIENT)
Dept: UROLOGY | Facility: CLINIC | Age: 77
End: 2022-11-08

## 2022-11-08 ENCOUNTER — TELEPHONE (OUTPATIENT)
Dept: UROLOGY | Facility: MEDICAL CENTER | Age: 77
End: 2022-11-08

## 2022-11-08 DIAGNOSIS — R31.29 MICROHEMATURIA: Primary | ICD-10-CM

## 2022-11-08 LAB
BACTERIA UR QL AUTO: ABNORMAL /HPF
BILIRUB UR QL STRIP: NEGATIVE
CLARITY UR: ABNORMAL
COLOR UR: YELLOW
GLUCOSE UR STRIP-MCNC: NEGATIVE MG/DL
HGB UR QL STRIP.AUTO: ABNORMAL
KETONES UR STRIP-MCNC: NEGATIVE MG/DL
LEUKOCYTE ESTERASE UR QL STRIP: ABNORMAL
MUCOUS THREADS UR QL AUTO: ABNORMAL
NITRITE UR QL STRIP: NEGATIVE
NON-SQ EPI CELLS URNS QL MICRO: ABNORMAL /HPF
PH UR STRIP.AUTO: 6 [PH]
PROT UR STRIP-MCNC: NEGATIVE MG/DL
RBC #/AREA URNS AUTO: ABNORMAL /HPF
SP GR UR STRIP.AUTO: 1.02 (ref 1–1.03)
UROBILINOGEN UR STRIP-ACNC: <2 MG/DL
WBC #/AREA URNS AUTO: ABNORMAL /HPF

## 2022-11-08 NOTE — TELEPHONE ENCOUNTER
----- Message from Sandor Parish PA-C sent at 11/8/2022  7:28 AM EST -----  UA showing innumerable RBCs  Recommend hematuria workup  Please schedule a cystoscopy with CT urogram prior to visit

## 2022-11-08 NOTE — TELEPHONE ENCOUNTER
Guillermina Callejas, [address] [facility] Cory Martinez Clinical  Cc: [facility]  UA showing innumerable RBCs   Recommend hematuria workup   Please schedule a cystoscopy with CT urogram prior to visit

## 2022-11-09 NOTE — TELEPHONE ENCOUNTER
L/m with CS# to schedule the CT - we will monitor for scheduling and call back with a cysto appt   Did advise we are scheduling those in January unless she is willing to travel outside of the area

## 2022-11-23 ENCOUNTER — TELEPHONE (OUTPATIENT)
Dept: UROLOGY | Facility: CLINIC | Age: 77
End: 2022-11-23

## 2022-11-23 ENCOUNTER — APPOINTMENT (OUTPATIENT)
Dept: LAB | Facility: HOSPITAL | Age: 77
End: 2022-11-23

## 2022-11-23 ENCOUNTER — NURSE TRIAGE (OUTPATIENT)
Dept: OTHER | Facility: OTHER | Age: 77
End: 2022-11-23

## 2022-11-23 DIAGNOSIS — N39.0 URINARY TRACT INFECTION WITHOUT HEMATURIA, SITE UNSPECIFIED: Primary | ICD-10-CM

## 2022-11-23 LAB
BACTERIA UR QL AUTO: ABNORMAL /HPF
BILIRUB UR QL STRIP: NEGATIVE
CLARITY UR: ABNORMAL
COLOR UR: YELLOW
GLUCOSE UR STRIP-MCNC: NEGATIVE MG/DL
HGB UR QL STRIP.AUTO: ABNORMAL
KETONES UR STRIP-MCNC: NEGATIVE MG/DL
LEUKOCYTE ESTERASE UR QL STRIP: ABNORMAL
NITRITE UR QL STRIP: NEGATIVE
NON-SQ EPI CELLS URNS QL MICRO: ABNORMAL /HPF
PH UR STRIP.AUTO: 5.5 [PH]
PROT UR STRIP-MCNC: ABNORMAL MG/DL
RBC #/AREA URNS AUTO: ABNORMAL /HPF
SP GR UR STRIP.AUTO: 1.02 (ref 1–1.03)
UROBILINOGEN UR STRIP-ACNC: <2 MG/DL
WBC #/AREA URNS AUTO: ABNORMAL /HPF

## 2022-11-23 RX ORDER — CEPHALEXIN 500 MG/1
500 CAPSULE ORAL EVERY 12 HOURS SCHEDULED
Qty: 14 CAPSULE | Refills: 0 | Status: SHIPPED | OUTPATIENT
Start: 2022-11-23 | End: 2022-11-30

## 2022-11-23 NOTE — TELEPHONE ENCOUNTER
Left detailed message regarding urine testing- advised patient of antibiotics also  Urology number provided- did advise office closes at 3 pm today, is closed on 11/24/22 and staff returns on 11/25/22

## 2022-11-23 NOTE — TELEPHONE ENCOUNTER
----- Message from César Tobar PA-C sent at 11/23/2022  1:53 PM EST -----  UA showing innumerable WBCs and occasional bacteria  Urine culture in process  Given the holiday, rx for abx sent to pharmacy  May need to adjust based on final culture

## 2022-11-23 NOTE — TELEPHONE ENCOUNTER
Regarding: POSSIBLE UTI WANTS URINE TEST  ----- Message from Heidi Meraz sent at 11/23/2022 12:09 PM EST -----  Patient is requesting for urine test order to be placed in her chart   She has UTI symptoms

## 2022-11-23 NOTE — TELEPHONE ENCOUNTER
Patient called in reporting symptoms of UTI for frequency, pressure when voiding with little output and bilateral flank pain  Urine labs ordered and patient will got to [facility] this afternoon  Please follow up with patient      Reason for Disposition  • Urinating more frequently than usual (i e , frequency)    Answer Assessment - Initial Assessment Questions  1  SYMPTOM: "What's the main symptom you're concerned about?" (e g , frequency, incontinence)       Pressure when voiding; not emptying; voiding small amounts; frequency    2  ONSET: "When did the symptoms start?"      11/18/22    3  PAIN: "Is there any pain?" If Yes, ask: "How bad is it?" (Scale: 1-10; mild, moderate, severe)      Pressure is moderate when voiding    4  CAUSE: "What do you think is causing the symptoms?"      UTI    5  OTHER SYMPTOMS: "Do you have any other symptoms?" (e g , fever, flank pain, blood in urine, pain with urination)      Bilateral flank pain/ache,     6   PREGNANCY: "Is there any chance you are pregnant?" "When was your last menstrual period?"      Post menopausal    Protocols used: [facility]

## 2022-11-24 LAB — BACTERIA UR CULT: ABNORMAL

## 2022-11-25 ENCOUNTER — TELEPHONE (OUTPATIENT)
Dept: UROLOGY | Facility: AMBULATORY SURGERY CENTER | Age: 77
End: 2022-11-25

## 2022-11-25 DIAGNOSIS — N39.0 FREQUENT UTI: Primary | ICD-10-CM

## 2022-12-13 ENCOUNTER — APPOINTMENT (OUTPATIENT)
Dept: LAB | Facility: HOSPITAL | Age: 77
End: 2022-12-13

## 2022-12-13 ENCOUNTER — NURSE TRIAGE (OUTPATIENT)
Dept: OTHER | Facility: OTHER | Age: 77
End: 2022-12-13

## 2022-12-13 DIAGNOSIS — R39.9 URINARY TRACT INFECTION SYMPTOMS: ICD-10-CM

## 2022-12-13 DIAGNOSIS — N39.0 URINARY TRACT INFECTION WITHOUT HEMATURIA, SITE UNSPECIFIED: Primary | ICD-10-CM

## 2022-12-13 DIAGNOSIS — R39.9 URINARY TRACT INFECTION SYMPTOMS: Primary | ICD-10-CM

## 2022-12-13 LAB
BACTERIA UR QL AUTO: ABNORMAL /HPF
BILIRUB UR QL STRIP: NEGATIVE
CLARITY UR: ABNORMAL
COLOR UR: YELLOW
GLUCOSE UR STRIP-MCNC: NEGATIVE MG/DL
HGB UR QL STRIP.AUTO: ABNORMAL
KETONES UR STRIP-MCNC: NEGATIVE MG/DL
LEUKOCYTE ESTERASE UR QL STRIP: ABNORMAL
MUCOUS THREADS UR QL AUTO: ABNORMAL
NITRITE UR QL STRIP: POSITIVE
NON-SQ EPI CELLS URNS QL MICRO: ABNORMAL /HPF
PH UR STRIP.AUTO: 7.5 [PH]
PROT UR STRIP-MCNC: ABNORMAL MG/DL
RBC #/AREA URNS AUTO: ABNORMAL /HPF
SP GR UR STRIP.AUTO: 1.03 (ref 1–1.03)
UROBILINOGEN UR STRIP-ACNC: <2 MG/DL
WBC #/AREA URNS AUTO: ABNORMAL /HPF

## 2022-12-13 RX ORDER — CEPHALEXIN 500 MG/1
500 CAPSULE ORAL EVERY 12 HOURS SCHEDULED
Qty: 14 CAPSULE | Refills: 0 | Status: SHIPPED | OUTPATIENT
Start: 2022-12-13 | End: 2022-12-20

## 2022-12-13 NOTE — TELEPHONE ENCOUNTER
There are orders for you to give a urine sample  The office will call and advise  Reason for Disposition  • Urinating more frequently than usual (i e , frequency)    Answer Assessment - Initial Assessment Questions  1  SYMPTOM: "What's the main symptom you're concerned about?" (e g , frequency, incontinence)      Frequency  And pain with urination   2  ONSET: "When did the  Frequency   start?"    Last Thursday   3  PAIN: "Is there any pain?" If Yes, ask: "How bad is it?" (Scale: 1-10; mild, moderate, severe)    Moderate   4   CAUSE: "What do you think is causing the symptoms?"     UTI   5  OTHER SYMPTOMS: "Do you have any other symptoms?" (e g , fever, flank pain, blood in urine, pain with urination)   denies    Protocols used: [facility]

## 2022-12-13 NOTE — TELEPHONE ENCOUNTER
Regarding: possible UTI  ----- Message from Cameron Barnes sent at 12/13/2022 10:23 AM EST -----  " I am having frequency with urination and it's painful   I would like to have a test "

## 2022-12-13 NOTE — TELEPHONE ENCOUNTER
Called and spoke to patient  Informed patient antibiotic was sent to pharmacy and may need to be changed based on final culture  Informed patient we will only call patient if antibiotic needs to be adjusted  Patient was thankful for the call and verbalized understanding

## 2022-12-16 ENCOUNTER — TELEPHONE (OUTPATIENT)
Dept: UROLOGY | Facility: CLINIC | Age: 77
End: 2022-12-16

## 2022-12-16 DIAGNOSIS — N39.0 URINARY TRACT INFECTION WITHOUT HEMATURIA, SITE UNSPECIFIED: Primary | ICD-10-CM

## 2022-12-16 LAB
BACTERIA UR CULT: ABNORMAL
BACTERIA UR CULT: ABNORMAL

## 2022-12-16 RX ORDER — LEVOFLOXACIN 500 MG/1
500 TABLET, FILM COATED ORAL EVERY 24 HOURS
Qty: 7 TABLET | Refills: 0 | Status: SHIPPED | OUTPATIENT
Start: 2022-12-16 | End: 2022-12-23

## 2022-12-16 NOTE — TELEPHONE ENCOUNTER
Called and spoke to PT of recommendations from Beck Montalvo  Pt verbalized understanding and was happy for the call     ----- Message from Alanis Subramanian PA-C sent at 12/16/2022 10:28 AM EST -----  Urine culture final  Will need to change abx   D/c Keflex and start Levaquin

## 2022-12-17 ENCOUNTER — APPOINTMENT (OUTPATIENT)
Dept: LAB | Facility: HOSPITAL | Age: 77
End: 2022-12-17

## 2022-12-17 DIAGNOSIS — R31.29 MICROHEMATURIA: ICD-10-CM

## 2022-12-17 LAB
ANION GAP SERPL CALCULATED.3IONS-SCNC: 8 MMOL/L (ref 4–13)
BUN SERPL-MCNC: 22 MG/DL (ref 5–25)
CALCIUM SERPL-MCNC: 8.7 MG/DL (ref 8.3–10.1)
CHLORIDE SERPL-SCNC: 104 MMOL/L (ref 96–108)
CO2 SERPL-SCNC: 28 MMOL/L (ref 21–32)
CREAT SERPL-MCNC: 0.95 MG/DL (ref 0.6–1.3)
GFR SERPL CREATININE-BSD FRML MDRD: 57 ML/MIN/1.73SQ M
GLUCOSE P FAST SERPL-MCNC: 115 MG/DL (ref 65–99)
POTASSIUM SERPL-SCNC: 3.1 MMOL/L (ref 3.5–5.3)
SODIUM SERPL-SCNC: 140 MMOL/L (ref 135–147)

## 2023-02-08 ENCOUNTER — HOSPITAL ENCOUNTER (OUTPATIENT)
Dept: CT IMAGING | Facility: CLINIC | Age: 78
Discharge: HOME | End: 2023-02-08

## 2023-02-08 DIAGNOSIS — R31.29 MICROHEMATURIA: ICD-10-CM

## 2023-02-08 RX ADMIN — IOHEXOL 100 ML: 350 INJECTION, SOLUTION INTRAVENOUS at 09:41

## 2023-02-14 ENCOUNTER — PROCEDURE VISIT (OUTPATIENT)
Dept: UROLOGY | Facility: CLINIC | Age: 78
End: 2023-02-14

## 2023-02-14 VITALS
HEIGHT: 65 IN | DIASTOLIC BLOOD PRESSURE: 82 MMHG | SYSTOLIC BLOOD PRESSURE: 120 MMHG | OXYGEN SATURATION: 98 % | WEIGHT: 148 LBS | BODY MASS INDEX: 24.66 KG/M2 | HEART RATE: 98 BPM

## 2023-02-14 DIAGNOSIS — N31.9 NEUROGENIC BLADDER: ICD-10-CM

## 2023-02-14 DIAGNOSIS — N39.0 FREQUENT UTI: ICD-10-CM

## 2023-02-14 DIAGNOSIS — N28.1 RENAL CYST: ICD-10-CM

## 2023-02-14 DIAGNOSIS — R31.29 MICROHEMATURIA: Primary | ICD-10-CM

## 2023-02-14 RX ORDER — LEVOFLOXACIN 500 MG/1
500 TABLET, FILM COATED ORAL EVERY 24 HOURS
Qty: 2 TABLET | Refills: 0 | Status: SHIPPED | OUTPATIENT
Start: 2023-02-14 | End: 2023-02-16

## 2023-02-14 NOTE — LETTER
February 14, 2023     Shasha Li MD  [address] [place] [address]    Patient: Luna Carlos   YOB: 1945   Date of Visit: 2/14/2023       Dear Dr Marc Nielsen: Thank you for referring Luna Carlos to me for evaluation  Below are my notes for this consultation  If you have questions, please do not hesitate to call me  I look forward to following your patient along with you  Sincerely,        Debi Guo MD        CC: No Recipients  Debi Guo MD  2/14/2023  2:20 PM  Sign when Signing Visit  Assessment/Plan:  #1  Recurrent urinary tract infection/acute cystitis-patient felt markedly better after taking Levaquin  We will track urine cultures if symptomatic  If patient is not symptomatic and she has bacteria in the urine would not suggest antibiotic treatment  Frequent timed voiding's were recommended in order to maintain the bladder in as empty a condition as possible    2  Left renal cyst-septated, no intervention, ultrasound in 1 year  #3  Neurogenic bladder-secondary to #4    #4-multiple sclerosis with overactive bladder-patient currently off Myrbetriq and now that she has a no growth urine culture she has no further urgency and frequency  No problem-specific Assessment & Plan notes found for this encounter  Diagnoses and all orders for this visit:    Microhematuria    Frequent UTI         Subjective:     Patient ID: Luna Carlos is a 76 y o  female  HPI  Luna Carlos is a 64 y o  female here for follow up evaluation of recurrent UTIs, left renal cyst, and neurogenic bladder      She reports to UTIs since last visit  Per chart review there has only been 1 positive urine culture in September  Urine culture in October was negative  She is managed on topical estrogen cream however reports she has not been using this recently  She also drinks minimal water throughout the day  She has a left renal cyst which is being monitored  She she admitted to urinary frequency and urgency issues at last visit  She has significant history of multiple sclerosis  She previously has failed oxybutynin  She had seen urologist several years ago and underwent cystoscopy which was negative per her report   No prior history of  surgical manipulation  She was started on Myrbetriq at last visit and reports excellent relief of urinary symptoms  She denies any UTI symptoms today  The following portions of the patient's history were reviewed and updated as appropriate: allergies, current medications, past family history, past medical history, past social history, past surgical history and problem list     Review of Systems   All other systems reviewed and are negative  Objective:      /82   Pulse 98   Ht 5' 5" (1 651 m)   Wt 67 1 kg (148 lb)   SpO2 98%   BMI 24 63 kg/m²         Physical Exam  Vitals reviewed  Constitutional:       General: She is not in acute distress  Appearance: Normal appearance  She is not ill-appearing, toxic-appearing or diaphoretic  HENT:      Head: Normocephalic and atraumatic  Nose: Nose normal       Mouth/Throat:      Mouth: Mucous membranes are moist    Eyes:      Extraocular Movements: Extraocular movements intact  Pulmonary:      Effort: Pulmonary effort is normal  No respiratory distress  Abdominal:      Palpations: Abdomen is soft  Genitourinary:     General: Normal vulva  Comments: Atrophic vaginal mucosa  Skin:     General: Skin is dry  Neurological:      Mental Status: She is alert and oriented to person, place, and time  Psychiatric:         Mood and Affect: Mood normal          Behavior: Behavior normal          Thought Content: Thought content normal          Judgment: Judgment normal            Cystoscopy     Date/Time 2/14/2023 2:18 PM     Performed by  Sona Walters MD     Authorized by Sona Walters MD      Universal Protocol:  Consent: Verbal consent obtained  Written consent obtained  Risks and benefits: risks, benefits and alternatives were discussed  Consent given by: patient  Patient understanding: patient states understanding of the procedure being performed  Patient consent: the patient's understanding of the procedure matches consent given  Procedure consent: procedure consent matches procedure scheduled  Required items: required blood products, implants, devices, and special equipment available  Patient identity confirmed: verbally with patient        Procedure Details:  Procedure type: cystoscopy    Patient tolerance: Patient tolerated the procedure well with no immediate complications    Additional Procedure Details: With the patient in dorsolithotomy position after prepping the urethral meatus with Betadine flexible video cystourethroscopy revealed a normal urethra without stricture or lesion  The bladder was without mucosal abnormalities except for signs of chronic cystitis cystica particularly at the trigone  No intrinsic lesions of the bladder were identified and there is no evidence of extrinsic compression of the bladder  PVR was normal   Ureteral orifice ease appeared normal bilaterally with clear reflux bilaterally  Retroflexion revealed a normal bladder neck  The scope was removed and the patient recovered uneventfully    She will return on an as-needed basis

## 2023-02-14 NOTE — PROGRESS NOTES
Assessment/Plan:  #1  Recurrent urinary tract infection/acute cystitis-patient felt markedly better after taking Levaquin  We will track urine cultures if symptomatic  If patient is not symptomatic and she has bacteria in the urine would not suggest antibiotic treatment  Frequent timed voiding's were recommended in order to maintain the bladder in as empty a condition as possible    2  Left renal cyst-septated, no intervention, ultrasound in 1 year  #3  Neurogenic bladder-secondary to #4    #4-multiple sclerosis with overactive bladder-patient currently off Myrbetriq and now that she has a no growth urine culture she has no further urgency and frequency  No problem-specific Assessment & Plan notes found for this encounter  Diagnoses and all orders for this visit:    Microhematuria    Frequent UTI          Subjective:      Patient ID: Magan Hernandez is a 76 y o  female  HPI  Magan Hernandez is a 64 y o  female here for follow up evaluation of recurrent UTIs, left renal cyst, and neurogenic bladder      She reports to UTIs since last visit  Per chart review there has only been 1 positive urine culture in September  Urine culture in October was negative  She is managed on topical estrogen cream however reports she has not been using this recently  She also drinks minimal water throughout the day  She has a left renal cyst which is being monitored  She she admitted to urinary frequency and urgency issues at last visit  She has significant history of multiple sclerosis  She previously has failed oxybutynin  She had seen urologist several years ago and underwent cystoscopy which was negative per her report   No prior history of  surgical manipulation  She was started on Myrbetriq at last visit and reports excellent relief of urinary symptoms    She denies any UTI symptoms today  The following portions of the patient's history were reviewed and updated as appropriate: allergies, current medications, past family history, past medical history, past social history, past surgical history and problem list     Review of Systems   All other systems reviewed and are negative  Objective:      /82   Pulse 98   Ht 5' 5" (1 651 m)   Wt 67 1 kg (148 lb)   SpO2 98%   BMI 24 63 kg/m²          Physical Exam  Vitals reviewed  Constitutional:       General: She is not in acute distress  Appearance: Normal appearance  She is not ill-appearing, toxic-appearing or diaphoretic  HENT:      Head: Normocephalic and atraumatic  Nose: Nose normal       Mouth/Throat:      Mouth: Mucous membranes are moist    Eyes:      Extraocular Movements: Extraocular movements intact  Pulmonary:      Effort: Pulmonary effort is normal  No respiratory distress  Abdominal:      Palpations: Abdomen is soft  Genitourinary:     General: Normal vulva  Comments: Atrophic vaginal mucosa  Skin:     General: Skin is dry  Neurological:      Mental Status: She is alert and oriented to person, place, and time  Psychiatric:         Mood and Affect: Mood normal          Behavior: Behavior normal          Thought Content: Thought content normal          Judgment: Judgment normal             Cystoscopy     Date/Time 2/14/2023 2:18 PM     Performed by  Martin Cueva MD     Authorized by Martin Cueva MD      Universal Protocol:  Consent: Verbal consent obtained  Written consent obtained    Risks and benefits: risks, benefits and alternatives were discussed  Consent given by: patient  Patient understanding: patient states understanding of the procedure being performed  Patient consent: the patient's understanding of the procedure matches consent given  Procedure consent: procedure consent matches procedure scheduled  Required items: required blood products, implants, devices, and special equipment available  Patient identity confirmed: verbally with patient        Procedure Details:  Procedure type: cystoscopy    Patient tolerance: Patient tolerated the procedure well with no immediate complications    Additional Procedure Details: With the patient in dorsolithotomy position after prepping the urethral meatus with Betadine flexible video cystourethroscopy revealed a normal urethra without stricture or lesion  The bladder was without mucosal abnormalities except for signs of chronic cystitis cystica particularly at the trigone  No intrinsic lesions of the bladder were identified and there is no evidence of extrinsic compression of the bladder  PVR was normal   Ureteral orifice ease appeared normal bilaterally with clear reflux bilaterally  Retroflexion revealed a normal bladder neck  The scope was removed and the patient recovered uneventfully    She will return on an as-needed basis

## 2023-09-21 NOTE — TELEPHONE ENCOUNTER
Keflex appropriate  Patient notified and verbalizes understanding that She needs repeat pap  Will send msg to PSR to contact pt for appt

## 2023-09-27 ENCOUNTER — NURSE TRIAGE (OUTPATIENT)
Age: 78
End: 2023-09-27

## 2023-09-27 ENCOUNTER — APPOINTMENT (OUTPATIENT)
Dept: LAB | Facility: HOSPITAL | Age: 78
End: 2023-09-27
Payer: COMMERCIAL

## 2023-09-27 DIAGNOSIS — N39.0 FREQUENT UTI: ICD-10-CM

## 2023-09-27 DIAGNOSIS — N39.0 FREQUENT UTI: Primary | ICD-10-CM

## 2023-09-27 LAB
BACTERIA UR QL AUTO: ABNORMAL /HPF
BILIRUB UR QL STRIP: NEGATIVE
CLARITY UR: ABNORMAL
COLOR UR: YELLOW
GLUCOSE UR STRIP-MCNC: NEGATIVE MG/DL
HGB UR QL STRIP.AUTO: ABNORMAL
KETONES UR STRIP-MCNC: NEGATIVE MG/DL
LEUKOCYTE ESTERASE UR QL STRIP: ABNORMAL
MUCOUS THREADS UR QL AUTO: ABNORMAL
NITRITE UR QL STRIP: POSITIVE
NON-SQ EPI CELLS URNS QL MICRO: ABNORMAL /HPF
PH UR STRIP.AUTO: 6 [PH]
PROT UR STRIP-MCNC: ABNORMAL MG/DL
RBC #/AREA URNS AUTO: ABNORMAL /HPF
SP GR UR STRIP.AUTO: 1.02 (ref 1–1.03)
UROBILINOGEN UR STRIP-ACNC: <2 MG/DL
WBC #/AREA URNS AUTO: ABNORMAL /HPF

## 2023-09-27 PROCEDURE — 81001 URINALYSIS AUTO W/SCOPE: CPT

## 2023-09-27 PROCEDURE — 87077 CULTURE AEROBIC IDENTIFY: CPT

## 2023-09-27 PROCEDURE — 87186 SC STD MICRODIL/AGAR DIL: CPT

## 2023-09-27 PROCEDURE — 87086 URINE CULTURE/COLONY COUNT: CPT

## 2023-09-27 NOTE — TELEPHONE ENCOUNTER
Regarding: Call due to: possible uti  ----- Message from Jj Hemphill MA sent at 9/27/2023  9:57 AM EDT -----  Patient of [facility]    Call due to: possible uti     Symptoms:  Burning with urination   Frequency/urgency   Foul smell  Cloudy     Denies blood, fever, chills at this time     CB: [phone]

## 2023-09-27 NOTE — TELEPHONE ENCOUNTER
Spoke to patient and she is having urinary frequency and voiding little amounts. She denies any nausea vomiting fevers chills or hematuria. She has history of UTI. I gave her care advice to hydrate, OTC AZO and to drink water and cranberry juice and avoid bladder irritants. Urine orders placed and ER/UC precautions reviewed. Message sent to provider for any further recommendations. Reason for Disposition  • Painful urination AND EITHER frequency or urgency    Answer Assessment - Initial Assessment Questions  1. SEVERITY: "How bad is the pain?"  (e.g., Scale 1-10; mild, moderate, or severe)    - MILD (1-3): complains slightly about urination hurting    - MODERATE (4-7): interferes with normal activities      - SEVERE (8-10): excruciating, unwilling or unable to urinate because of the pain       Moderate   2. FREQUENCY: "How many times have you had painful urination today?"       Every few minutes voiding little amounts   3. PATTERN: "Is pain present every time you urinate or just sometimes?"       Every time  4. ONSET: "When did the painful urination start?"       4 days ago   5. FEVER: "Do you have a fever?" If Yes, ask: "What is your temperature, how was it measured, and when did it start?"      No   6. PAST UTI: "Have you had a urine infection before?" If Yes, ask: "When was the last time?" and "What happened that time?"       Yes   7. CAUSE: "What do you think is causing the painful urination?"  (e.g., UTI, scratch, Herpes sore)      Slight burning   8.  OTHER SYMPTOMS: "Do you have any other symptoms?" (e.g., flank pain, vaginal discharge, genital sores, urgency, blood in urine)      No    Protocols used: URINATION PAIN - FEMALE-ADULT-[place]

## 2023-09-29 ENCOUNTER — TELEPHONE (OUTPATIENT)
Dept: UROLOGY | Facility: CLINIC | Age: 78
End: 2023-09-29

## 2023-09-29 DIAGNOSIS — N39.0 URINARY TRACT INFECTION WITHOUT HEMATURIA, SITE UNSPECIFIED: Primary | ICD-10-CM

## 2023-09-29 LAB
BACTERIA UR CULT: ABNORMAL
BACTERIA UR CULT: ABNORMAL

## 2023-09-29 RX ORDER — AMOXICILLIN AND CLAVULANATE POTASSIUM 500; 125 MG/1; MG/1
1 TABLET, FILM COATED ORAL EVERY 12 HOURS SCHEDULED
Qty: 14 TABLET | Refills: 0 | Status: SHIPPED | OUTPATIENT
Start: 2023-09-29 | End: 2023-10-06

## 2023-09-29 NOTE — TELEPHONE ENCOUNTER
----- Message from Nehemiah Henriquez PA-C sent at 9/29/2023 11:02 AM EDT -----  Prelim urine culture positive, abx sent to pharmacy.  May need to adjust abx based on final culture

## 2024-01-11 ENCOUNTER — APPOINTMENT (EMERGENCY)
Dept: RADIOLOGY | Facility: HOSPITAL | Age: 79
End: 2024-01-11
Payer: COMMERCIAL

## 2024-01-11 ENCOUNTER — HOSPITAL ENCOUNTER (EMERGENCY)
Facility: HOSPITAL | Age: 79
Discharge: HOME | End: 2024-01-11
Payer: COMMERCIAL

## 2024-01-11 ENCOUNTER — APPOINTMENT (EMERGENCY)
Dept: CT IMAGING | Facility: HOSPITAL | Age: 79
End: 2024-01-11
Payer: COMMERCIAL

## 2024-01-11 VITALS
RESPIRATION RATE: 22 BRPM | DIASTOLIC BLOOD PRESSURE: 59 MMHG | TEMPERATURE: 97.6 F | OXYGEN SATURATION: 95 % | SYSTOLIC BLOOD PRESSURE: 131 MMHG | HEART RATE: 96 BPM

## 2024-01-11 DIAGNOSIS — R07.9 CHEST PAIN, UNSPECIFIED: Primary | ICD-10-CM

## 2024-01-11 LAB
2HR DELTA HS TROPONIN: 1 NG/L
4HR DELTA HS TROPONIN: 2 NG/L
ALBUMIN SERPL BCP-MCNC: 3.8 G/DL (ref 3.5–5)
ALP SERPL-CCNC: 88 U/L (ref 34–104)
ALT SERPL W P-5'-P-CCNC: 8 U/L (ref 7–52)
ANION GAP SERPL CALCULATED.3IONS-SCNC: 6 MMOL/L
APTT PPP: 30 SECONDS (ref 23–37)
AST SERPL W P-5'-P-CCNC: 11 U/L (ref 13–39)
ATRIAL RATE: 135 BPM
BASOPHILS # BLD AUTO: 0.02 THOUSANDS/ÂΜL (ref 0–0.1)
BASOPHILS NFR BLD AUTO: 0 % (ref 0–1)
BILIRUB SERPL-MCNC: 0.72 MG/DL (ref 0.2–1)
BNP SERPL-MCNC: 80 PG/ML (ref 0–100)
BUN SERPL-MCNC: 18 MG/DL (ref 5–25)
CALCIUM SERPL-MCNC: 9.1 MG/DL (ref 8.4–10.2)
CARDIAC TROPONIN I PNL SERPL HS: 7 NG/L
CARDIAC TROPONIN I PNL SERPL HS: 8 NG/L
CARDIAC TROPONIN I PNL SERPL HS: 9 NG/L
CHLORIDE SERPL-SCNC: 104 MMOL/L (ref 96–108)
CO2 SERPL-SCNC: 30 MMOL/L (ref 21–32)
CREAT SERPL-MCNC: 0.86 MG/DL (ref 0.6–1.3)
D DIMER PPP FEU-MCNC: 1.12 UG/ML FEU
EOSINOPHIL # BLD AUTO: 0.18 THOUSAND/ÂΜL (ref 0–0.61)
EOSINOPHIL NFR BLD AUTO: 2 % (ref 0–6)
ERYTHROCYTE [DISTWIDTH] IN BLOOD BY AUTOMATED COUNT: 14.4 % (ref 11.6–15.1)
FLUAV RNA RESP QL NAA+PROBE: NEGATIVE
FLUBV RNA RESP QL NAA+PROBE: NEGATIVE
GFR SERPL CREATININE-BSD FRML MDRD: 64 ML/MIN/1.73SQ M
GLUCOSE SERPL-MCNC: 100 MG/DL (ref 65–140)
HCT VFR BLD AUTO: 38.7 % (ref 34.8–46.1)
HGB BLD-MCNC: 12.7 G/DL (ref 11.5–15.4)
IMM GRANULOCYTES # BLD AUTO: 0.02 THOUSAND/UL (ref 0–0.2)
IMM GRANULOCYTES NFR BLD AUTO: 0 % (ref 0–2)
INR PPP: 0.89 (ref 0.84–1.19)
LACTATE SERPL-SCNC: 1.1 MMOL/L (ref 0.5–2)
LYMPHOCYTES # BLD AUTO: 1.29 THOUSANDS/ÂΜL (ref 0.6–4.47)
LYMPHOCYTES NFR BLD AUTO: 16 % (ref 14–44)
MCH RBC QN AUTO: 31.4 PG (ref 26.8–34.3)
MCHC RBC AUTO-ENTMCNC: 32.8 G/DL (ref 31.4–37.4)
MCV RBC AUTO: 96 FL (ref 82–98)
MONOCYTES # BLD AUTO: 0.58 THOUSAND/ÂΜL (ref 0.17–1.22)
MONOCYTES NFR BLD AUTO: 7 % (ref 4–12)
NEUTROPHILS # BLD AUTO: 5.89 THOUSANDS/ÂΜL (ref 1.85–7.62)
NEUTS SEG NFR BLD AUTO: 75 % (ref 43–75)
NRBC BLD AUTO-RTO: 0 /100 WBCS
PLATELET # BLD AUTO: 251 THOUSANDS/UL (ref 149–390)
PMV BLD AUTO: 9.6 FL (ref 8.9–12.7)
POTASSIUM SERPL-SCNC: 3.2 MMOL/L (ref 3.5–5.3)
PR INTERVAL: 148 MS
PROT SERPL-MCNC: 7.2 G/DL (ref 6.4–8.4)
PROTHROMBIN TIME: 12.6 SECONDS (ref 11.6–14.5)
QRS AXIS: 51 DEGREES
QRSD INTERVAL: 78 MS
QT INTERVAL: 286 MS
QTC INTERVAL: 429 MS
RBC # BLD AUTO: 4.04 MILLION/UL (ref 3.81–5.12)
RSV RNA RESP QL NAA+PROBE: NEGATIVE
SARS-COV-2 RNA RESP QL NAA+PROBE: NEGATIVE
SODIUM SERPL-SCNC: 140 MMOL/L (ref 135–147)
T WAVE AXIS: -33 DEGREES
VENTRICULAR RATE: 135 BPM
WBC # BLD AUTO: 7.98 THOUSAND/UL (ref 4.31–10.16)

## 2024-01-11 PROCEDURE — 96366 THER/PROPH/DIAG IV INF ADDON: CPT

## 2024-01-11 PROCEDURE — 87040 BLOOD CULTURE FOR BACTERIA: CPT | Performed by: PHYSICIAN ASSISTANT

## 2024-01-11 PROCEDURE — 99285 EMERGENCY DEPT VISIT HI MDM: CPT

## 2024-01-11 PROCEDURE — 71045 X-RAY EXAM CHEST 1 VIEW: CPT

## 2024-01-11 PROCEDURE — 0241U HB NFCT DS VIR RESP RNA 4 TRGT: CPT | Performed by: PHYSICIAN ASSISTANT

## 2024-01-11 PROCEDURE — 96375 TX/PRO/DX INJ NEW DRUG ADDON: CPT

## 2024-01-11 PROCEDURE — 80053 COMPREHEN METABOLIC PANEL: CPT | Performed by: PHYSICIAN ASSISTANT

## 2024-01-11 PROCEDURE — 84484 ASSAY OF TROPONIN QUANT: CPT | Performed by: PHYSICIAN ASSISTANT

## 2024-01-11 PROCEDURE — 93005 ELECTROCARDIOGRAM TRACING: CPT

## 2024-01-11 PROCEDURE — 71275 CT ANGIOGRAPHY CHEST: CPT

## 2024-01-11 PROCEDURE — 36415 COLL VENOUS BLD VENIPUNCTURE: CPT | Performed by: PHYSICIAN ASSISTANT

## 2024-01-11 PROCEDURE — 85379 FIBRIN DEGRADATION QUANT: CPT | Performed by: PHYSICIAN ASSISTANT

## 2024-01-11 PROCEDURE — 85610 PROTHROMBIN TIME: CPT | Performed by: PHYSICIAN ASSISTANT

## 2024-01-11 PROCEDURE — 85730 THROMBOPLASTIN TIME PARTIAL: CPT | Performed by: PHYSICIAN ASSISTANT

## 2024-01-11 PROCEDURE — 85025 COMPLETE CBC W/AUTO DIFF WBC: CPT | Performed by: PHYSICIAN ASSISTANT

## 2024-01-11 PROCEDURE — 99285 EMERGENCY DEPT VISIT HI MDM: CPT | Performed by: EMERGENCY MEDICINE

## 2024-01-11 PROCEDURE — 96365 THER/PROPH/DIAG IV INF INIT: CPT

## 2024-01-11 PROCEDURE — 83880 ASSAY OF NATRIURETIC PEPTIDE: CPT | Performed by: PHYSICIAN ASSISTANT

## 2024-01-11 PROCEDURE — 83605 ASSAY OF LACTIC ACID: CPT | Performed by: PHYSICIAN ASSISTANT

## 2024-01-11 RX ORDER — KETOROLAC TROMETHAMINE 30 MG/ML
15 INJECTION, SOLUTION INTRAMUSCULAR; INTRAVENOUS ONCE
Status: COMPLETED | OUTPATIENT
Start: 2024-01-11 | End: 2024-01-11

## 2024-01-11 RX ORDER — LIDOCAINE 50 MG/G
1 PATCH TOPICAL ONCE
Status: DISCONTINUED | OUTPATIENT
Start: 2024-01-11 | End: 2024-01-11 | Stop reason: HOSPADM

## 2024-01-11 RX ORDER — POTASSIUM CHLORIDE 20 MEQ/1
40 TABLET, EXTENDED RELEASE ORAL ONCE
Status: COMPLETED | OUTPATIENT
Start: 2024-01-11 | End: 2024-01-11

## 2024-01-11 RX ORDER — ACETAMINOPHEN 10 MG/ML
1000 INJECTION, SOLUTION INTRAVENOUS ONCE
Status: COMPLETED | OUTPATIENT
Start: 2024-01-11 | End: 2024-01-11

## 2024-01-11 RX ADMIN — POTASSIUM CHLORIDE 40 MEQ: 1500 TABLET, EXTENDED RELEASE ORAL at 10:34

## 2024-01-11 RX ADMIN — ACETAMINOPHEN 1000 MG: 10 INJECTION INTRAVENOUS at 10:34

## 2024-01-11 RX ADMIN — IOHEXOL 85 ML: 350 INJECTION, SOLUTION INTRAVENOUS at 08:56

## 2024-01-11 RX ADMIN — KETOROLAC TROMETHAMINE 15 MG: 30 INJECTION, SOLUTION INTRAMUSCULAR; INTRAVENOUS at 12:33

## 2024-01-11 RX ADMIN — LIDOCAINE 5% 1 PATCH: 700 PATCH TOPICAL at 10:34

## 2024-01-11 NOTE — ED PROVIDER NOTES
History  Chief Complaint   Patient presents with    Chest Pain     L sided chest pain x 2 days, worse with breathing      Francesca Retana is an 78-year-old female with past medical history including MS, NSCLC s/p right pneumonectomy presenting by EMS for evaluation with complaint of left-sided pleuritic chest discomfort.  She reports that symptoms have been present for a couple of days noting that she was awoken from sleep yesterday with pain.  She does admit that she has been taking more shallow breaths as deep breathing exacerbates her pain.  She does report cough which is nonproductive.  She had COVID about 1 month ago.  She denies any fevers, chills, sweats.  No episodes of hemoptysis.  She denies calf pain or leg swelling.  The patient is tachycardic on presentation denying palpitations, dizziness or lightheadedness, or near syncope.  She denies recent travel or recent surgical procedures.  She offers no other complaints or concerns at this time.        Prior to Admission Medications   Prescriptions Last Dose Informant Patient Reported? Taking?   Cholecalciferol 50 MCG (2000 UT) CAPS  Self Yes No   Sig: Take 2 capsules by mouth daily   albuterol (PROVENTIL HFA,VENTOLIN HFA) 90 mcg/act inhaler  Self Yes No   Sig: Inhale 2 puffs every 6 (six) hours as needed   aspirin (ECOTRIN LOW STRENGTH) 81 mg EC tablet  Self Yes No   Sig: Take 81 mg by mouth daily   betamethasone, augmented, (DIPROLENE) 0.05 % ointment  Self Yes No   Sig: as needed   carBAMazepine (TEGretol) 200 mg tablet  Self Yes No   Sig: Take 1 tablet by mouth 2 (two) times a day   cyanocobalamin (VITAMIN B-12) 100 MCG tablet  Self Yes No   Sig: Take 100 mcg by mouth daily   estradiol (ESTRACE VAGINAL) 0.1 mg/g vaginal cream  Self No No   Sig: Apply pea sized amount around urethra and inside vaginal opening 3 times per week   Patient not taking: Reported on 2/14/2023      Facility-Administered Medications: None       Past Medical History:   Diagnosis  Date    Cancer (HCC)     only has the left lung     MS (multiple sclerosis) (HCC)     Neurogenic bladder     Trigeminal neuralgia        Past Surgical History:   Procedure Laterality Date    LUNG REMOVAL, TOTAL      right        Family History   Problem Relation Age of Onset    Cirrhosis Father     No Known Problems Mother     No Known Problems Daughter     No Known Problems Daughter      I have reviewed and agree with the history as documented.    E-Cigarette/Vaping    E-Cigarette Use Never User      E-Cigarette/Vaping Substances    Nicotine No     THC No     CBD Yes TINCTURE    Flavoring No     Other No     Unknown No      Social History     Tobacco Use    Smoking status: Former     Passive exposure: Past (as a teenager growing up)    Smokeless tobacco: Never   Vaping Use    Vaping status: Never Used   Substance Use Topics    Alcohol use: Yes     Comment: Occ.    Drug use: Never       Review of Systems   Constitutional:  Negative for chills, diaphoresis and fever.   Eyes:  Negative for visual disturbance.   Respiratory:  Positive for cough. Negative for shortness of breath.    Cardiovascular:  Positive for chest pain.   Gastrointestinal:  Negative for abdominal pain, nausea and vomiting.   Neurological:  Negative for dizziness and headaches.   All other systems reviewed and are negative.      Physical Exam  Physical Exam  Vitals and nursing note reviewed.   Constitutional:       General: She is not in acute distress.     Appearance: Normal appearance. She is well-developed. She is not ill-appearing, toxic-appearing or diaphoretic.   HENT:      Head: Normocephalic and atraumatic.      Right Ear: External ear normal.      Left Ear: External ear normal.   Eyes:      Conjunctiva/sclera: Conjunctivae normal.   Cardiovascular:      Rate and Rhythm: Regular rhythm. Tachycardia present.      Pulses: Normal pulses.   Pulmonary:      Effort: Pulmonary effort is normal. No respiratory distress.      Breath sounds: Normal  breath sounds. No wheezing, rhonchi or rales.   Chest:      Chest wall: Tenderness present.   Abdominal:      General: There is no distension.      Palpations: Abdomen is soft.      Tenderness: There is no abdominal tenderness.   Musculoskeletal:         General: Normal range of motion.      Cervical back: Normal range of motion and neck supple.      Right lower leg: No tenderness. No edema.      Left lower leg: No tenderness. No edema.   Skin:     General: Skin is warm and dry.      Capillary Refill: Capillary refill takes less than 2 seconds.   Neurological:      Mental Status: She is alert.      Motor: Motor function is intact. No weakness.      Gait: Gait is intact.   Psychiatric:         Mood and Affect: Mood normal.         Vital Signs  ED Triage Vitals [01/11/24 0726]   Temperature Pulse Respirations Blood Pressure SpO2   97.6 °F (36.4 °C) (!) 135 20 (!) 184/99 95 %      Temp src Heart Rate Source Patient Position - Orthostatic VS BP Location FiO2 (%)   -- -- -- -- --      Pain Score       --           Vitals:    01/11/24 0726   BP: (!) 184/99   Pulse: (!) 135         Visual Acuity      ED Medications  Medications - No data to display    Diagnostic Studies  Results Reviewed       Procedure Component Value Units Date/Time    FLU/RSV/COVID - if FLU/RSV clinically relevant [[id number]]  (Normal) Collected: 01/11/24 0735    Lab Status: Final result Specimen: Nares from Nose Updated: 01/11/24 0829     SARS-CoV-2 Negative     INFLUENZA A PCR Negative     INFLUENZA B PCR Negative     RSV PCR Negative    Narrative:      FOR PEDIATRIC PATIENTS - copy/paste COVID Guidelines URL to browser: [email or web address]    SARS-CoV-2 assay is a Nucleic Acid Amplification assay intended for the  qualitative detection of nucleic acid from SARS-CoV-2 in nasopharyngeal  swabs. Results are for the presumptive identification of SARS-CoV-2 RNA.    Positive results are indicative of  infection with SARS-CoV-2, the virus  causing COVID-19, but do not rule out bacterial infection or co-infection  with other viruses. Laboratories within the [place] and its  territories are required to report all positive results to the appropriate  public health authorities. Negative results do not preclude SARS-CoV-2  infection and should not be used as the sole basis for treatment or other  patient management decisions. Negative results must be combined with  clinical observations, patient history, and epidemiological information.  This test has not been FDA cleared or approved.    This test has been authorized by FDA under an Emergency Use Authorization  (EUA). This test is only authorized for the duration of time the  declaration that circumstances exist justifying the authorization of the  emergency use of an in vitro diagnostic tests for detection of SARS-CoV-2  virus and/or diagnosis of COVID-19 infection under section 564(b)(1) of  the Act, 21 U.S.C. 360bbb-3(b)(1), unless the authorization is terminated  or revoked sooner. The test has been validated but independent review by FDA  and CLIA is pending.    Test performed using [employer] GeneXpert: This RT-PCR assay targets N2,  a region unique to SARS-CoV-2. A conserved region in the E-gene was chosen  for pan-Sarbecovirus detection which includes SARS-CoV-2.    According to CMS-2020-01-R, this platform meets the definition of high-throughput technology.    HS Troponin 0hr (reflex protocol) [[id number]]  (Normal) Collected: 01/11/24 0732    Lab Status: Final result Specimen: Blood from Arm, Left Updated: 01/11/24 0819     hs TnI 0hr 7 ng/L     HS Troponin I 2hr [[id number]]     Lab Status: No result Specimen: Blood     HS Troponin I 4hr [[id number]]     Lab Status: No result Specimen: Blood     B-Type Natriuretic Peptide(BNP) [[id number]]  (Normal) Collected: 01/11/24 0732    Lab Status: Final result Specimen: Blood from Arm, Left Updated: 01/11/24 0819      BNP 80 pg/mL     Comprehensive metabolic panel [[id number]]  (Abnormal) Collected: 01/11/24 0732    Lab Status: Final result Specimen: Blood from Arm, Left Updated: 01/11/24 0814     Sodium 140 mmol/L      Potassium 3.2 mmol/L      Chloride 104 mmol/L      CO2 30 mmol/L      ANION GAP 6 mmol/L      BUN 18 mg/dL      Creatinine 0.86 mg/dL      Glucose 100 mg/dL      Calcium 9.1 mg/dL      AST 11 U/L      ALT 8 U/L      Alkaline Phosphatase 88 U/L      Total Protein 7.2 g/dL      Albumin 3.8 g/dL      Total Bilirubin 0.72 mg/dL      eGFR 64 ml/min/1.73sq m     Narrative:      National Kidney Disease Foundation guidelines for Chronic Kidney Disease (CKD):     Stage 1 with normal or high GFR (GFR > 90 mL/min/1.73 square meters)    Stage 2 Mild CKD (GFR = 60-89 mL/min/1.73 square meters)    Stage 3A Moderate CKD (GFR = 45-59 mL/min/1.73 square meters)    Stage 3B Moderate CKD (GFR = 30-44 mL/min/1.73 square meters)    Stage 4 Severe CKD (GFR = 15-29 mL/min/1.73 square meters)    Stage 5 End Stage CKD (GFR <15 mL/min/1.73 square meters)  Note: GFR calculation is accurate only with a steady state creatinine    Lactic acid, plasma (w/reflex if result > 2.0) [[id number]]  (Normal) Collected: 01/11/24 0735    Lab Status: Final result Specimen: Blood from Arm, Left Updated: 01/11/24 0813     LACTIC ACID 1.1 mmol/L     Narrative:      Result may be elevated if tourniquet was used during collection.    D-Dimer [[id number]]  (Abnormal) Collected: 01/11/24 0732    Lab Status: Final result Specimen: Blood from Arm, Left Updated: 01/11/24 0812     D-Dimer, Quant 1.12 ug/ml FEU     Narrative:      In the evaluation for possible pulmonary embolism, in the appropriate (Well's Score of 4 or less) patient, the age adjusted d-dimer cutoff for this patient can be calculated as:    Age x 0.01 (in ug/mL) for Age-adjusted D-dimer exclusion threshold for a patient over 50 years.    Protime-INR [[id number]]  (Normal) Collected: 01/11/24 0732    Lab  Status: Final result Specimen: Blood from Arm, Left Updated: 01/11/24 0808     Protime 12.6 seconds      INR 0.89    APTT [[id number]]  (Normal) Collected: 01/11/24 0732    Lab Status: Final result Specimen: Blood from Arm, Left Updated: 01/11/24 0808     PTT 30 seconds     CBC and differential [[id number]] Collected: 01/11/24 0732    Lab Status: Final result Specimen: Blood from Arm, Left Updated: 01/11/24 0751     WBC 7.98 Thousand/uL      RBC 4.04 Million/uL      Hemoglobin 12.7 g/dL      Hematocrit 38.7 %      MCV 96 fL      MCH 31.4 pg      MCHC 32.8 g/dL      RDW 14.4 %      MPV 9.6 fL      Platelets 251 Thousands/uL      nRBC 0 /100 WBCs      Neutrophils Relative 75 %      Immat GRANS % 0 %      Lymphocytes Relative 16 %      Monocytes Relative 7 %      Eosinophils Relative 2 %      Basophils Relative 0 %      Neutrophils Absolute 5.89 Thousands/µL      Immature Grans Absolute 0.02 Thousand/uL      Lymphocytes Absolute 1.29 Thousands/µL      Monocytes Absolute 0.58 Thousand/µL      Eosinophils Absolute 0.18 Thousand/µL      Basophils Absolute 0.02 Thousands/µL     Blood culture #2 [[id number]] Collected: 01/11/24 0735    Lab Status: In process Specimen: Blood from Arm, Left Updated: 01/11/24 0747    Blood culture #1 [[id number]] Updated: 01/11/24 0747    Lab Status: In process Specimen: Blood                    XR chest 1 view portable    (Results Pending)   CTA ED chest PE Study    (Results Pending)              Procedures  ECG 12 Lead Documentation Only    Date/Time: 1/11/2024 8:44 AM    Performed by: Nicki Tsai PA-C  Authorized by: Nicki Tsai PA-C    Indications / Diagnosis:  Cp  ECG reviewed by me, the ED Provider: yes    Patient location:  ED  Rate:     ECG rate:  135    ECG rate assessment: normal    Rhythm:     Rhythm: sinus rhythm    Ectopy:     Ectopy: none    QRS:     QRS axis:  Normal    QRS intervals:  Normal  Conduction:     Conduction: normal    ST segments:     ST segments:   Non-specific  T waves:     T waves: non-specific             ED Course                               SBIRT 20yo+      Flowsheet Row Most Recent Value   Initial Alcohol Screen: US AUDIT-C     1. How often do you have a drink containing alcohol? 0 Filed at: 01/11/2024 0726   2. How many drinks containing alcohol do you have on a typical day you are drinking?  0 Filed at: 01/11/2024 0726   3a. Male UNDER 65: How often do you have five or more drinks on one occasion? 0 Filed at: 01/11/2024 0726   3b. FEMALE Any Age, or MALE 65+: How often do you have 4 or more drinks on one occassion? 0 Filed at: 01/11/2024 0726   Audit-C Score 0 Filed at: 01/11/2024 0726   LIA: How many times in the past year have you...    Used an illegal drug or used a prescription medication for non-medical reasons? Never Filed at: 01/11/2024 0726            Wells' Criteria for PE      Flowsheet Row Most Recent Value   Wells' Criteria for PE    Clinical signs and symptoms of DVT 0 Filed at: 01/11/2024 0825   PE is primary diagnosis or equally likely 3 Filed at: 01/11/2024 0825   HR >100 1.5 Filed at: 01/11/2024 0825   Immobilization at least 3 days or Surgery in the previous 4 weeks 0 Filed at: 01/11/2024 0825   Previous, objectively diagnosed PE or DVT 0 Filed at: 01/11/2024 0825   Hemoptysis 0 Filed at: 01/11/2024 0825   Malignancy with treatment within 6 months or palliative 0 Filed at: 01/11/2024 0825   Wells' Criteria Total 4.5 Filed at: 01/11/2024 0825                  Medical Decision Making  Amount and/or Complexity of Data Reviewed  Labs: ordered.             Disposition  Final diagnoses:   None     ED Disposition       None          Follow-up Information    None         Patient's Medications   Discharge Prescriptions    No medications on file       No discharge procedures on file.    PDMP Review       None            ED Provider  Electronically Signed by           alcohol? 0 Filed at: 01/11/2024 0726   2. How many drinks containing alcohol do you have on a typical day you are drinking?  0 Filed at: 01/11/2024 0726   3a. Male UNDER 65: How often do you have five or more drinks on one occasion? 0 Filed at: 01/11/2024 0726   3b. FEMALE Any Age, or MALE 65+: How often do you have 4 or more drinks on one occassion? 0 Filed at: 01/11/2024 0726   Audit-C Score 0 Filed at: 01/11/2024 0726   LIA: How many times in the past year have you...    Used an illegal drug or used a prescription medication for non-medical reasons? Never Filed at: 01/11/2024 0726            Wells' Criteria for PE      Flowsheet Row Most Recent Value   Wells' Criteria for PE    Clinical signs and symptoms of DVT 0 Filed at: 01/11/2024 0825   PE is primary diagnosis or equally likely 3 Filed at: 01/11/2024 0825   HR >100 1.5 Filed at: 01/11/2024 0825   Immobilization at least 3 days or Surgery in the previous 4 weeks 0 Filed at: 01/11/2024 0825   Previous, objectively diagnosed PE or DVT 0 Filed at: 01/11/2024 0825   Hemoptysis 0 Filed at: 01/11/2024 0825   Malignancy with treatment within 6 months or palliative 0 Filed at: 01/11/2024 0825   Wells' Criteria Total 4.5 Filed at: 01/11/2024 0825                  Medical Decision Making  This is an 78-year-old female arriving by EMS for evaluation with complaint of left-sided pleuritic chest pain.  Symptoms have been present for 2 days with no inciting event to her knowledge.    Differential diagnosis includes but is not limited to: ACS/anginal equivalent, arrhythmia, PE, pneumothorax, costochondritis, pleurisy, pneumonia, effusions, viral illness    Initial ED plan: ECG, labs, imaging    Final ED Assessment: Vital signs reviewed on ED presentation, examination as above.  Patient is tachycardic on arrival which had resolved during ED course.  All labs and imaging independently reviewed with imaging interpreted by the Radiologist.  ECG without ischemic change,  troponin within normal limits.  D-dimer elevated and CTA chest ordered which reports no pulmonary embolus and postsurgical changes of right pneumonectomy.  Test results reviewed with patient and daughter at bedside.  Patient reporting improvement in symptoms with a topical lidocaine patch and oral Tylenol.  The patient improved vital signs and symptoms in conjunction with negative workup today are reassuring.  Discussed the possibility of musculoskeletal etiology with recommendation to continue topical Lidoderm patches every 12 hours as needed as well as oral Tylenol as needed.  Outpatient PCP/cardiology follow-up for reevaluation as needed.  Parameters for ED return discussed at length.  Patient verbalized understanding of plan as above which she is comfortable and agreeable with.  She was discharged in stable condition and had ambulated independently from the emergency department today without issue.      Amount and/or Complexity of Data Reviewed  Labs: ordered.  Radiology: ordered.  ECG/medicine tests: ordered.    Risk  OTC drugs.  Prescription drug management.             Disposition  Final diagnoses:   Chest pain, unspecified     Time reflects when diagnosis was documented in both MDM as applicable and the Disposition within this note       Time User Action Codes Description Comment    1/11/2024 12:53 PM Nicki Tsai Add [R07.9] Chest pain, unspecified           ED Disposition       ED Disposition   Discharge    Condition   Stable    Date/Time   Thu Jan 11, 2024 1211    Comment   Francesca Retana discharge to home/self care.                   Follow-up Information       Follow up With Specialties Details Why Contact Info Additional Information    Jazz Tripp MD Internal Medicine   [address] [place] [id number]  [phone]       [facility] Cardiology [place] [address] [address]  [facility] [address] [phone] [facility]  [place], [facility] [address], [place], [place], [id number] [phone]    [facility] Emergency Department Emergency Medicine  If symptoms worsen [address] [facility] [address] [phone] [facility] Emergency Department, [address] [place], [place], [address]            Discharge Medication List as of 1/11/2024 12:56 PM        CONTINUE these medications which have NOT CHANGED    Details   albuterol (PROVENTIL HFA,VENTOLIN HFA) 90 mcg/act inhaler Inhale 2 puffs every 6 (six) hours as needed, Starting Fri 3/11/2022, Historical Med      aspirin (ECOTRIN LOW STRENGTH) 81 mg EC tablet Take 81 mg by mouth daily, Historical Med      betamethasone, augmented, (DIPROLENE) 0.05 % ointment as needed, Historical Med      carBAMazepine (TEGretol) 200 mg tablet Take 1 tablet by mouth 2 (two) times a day, Starting Fri 3/11/2022, Historical Med      Cholecalciferol 50 MCG (2000 UT) CAPS Take 2 capsules by mouth daily, Historical Med      cyanocobalamin (VITAMIN B-12) 100 MCG tablet Take 100 mcg by mouth daily, Historical Med      estradiol (ESTRACE VAGINAL) 0.1 mg/g vaginal cream Apply pea sized amount around urethra and inside vaginal opening 3 times per week, Normal             No discharge procedures on file.    PDMP Review       None            ED Provider  Electronically Signed by             Nicki Tsai PA-C  01/15/24 0601

## 2024-01-11 NOTE — DISCHARGE INSTRUCTIONS
Topical lidocaine patch to the chest wall 12 hours on/12 hours off.  Tylenol 650 mg every 6 hours as needed for pain relief.  If elevated heart rate persist or you develop palpitations please follow-up with cardiology for further evaluation management.  Return immediately to the ED with any new or worsening symptoms as discussed.

## 2024-01-14 LAB
BACTERIA BLD CULT: NORMAL
BACTERIA BLD CULT: NORMAL

## 2024-01-16 LAB
BACTERIA BLD CULT: NORMAL
BACTERIA BLD CULT: NORMAL

## 2024-02-06 ENCOUNTER — CONSULT (OUTPATIENT)
Dept: CARDIOLOGY CLINIC | Facility: CLINIC | Age: 79
End: 2024-02-06
Payer: COMMERCIAL

## 2024-02-06 VITALS
BODY MASS INDEX: 22.16 KG/M2 | WEIGHT: 133 LBS | DIASTOLIC BLOOD PRESSURE: 88 MMHG | RESPIRATION RATE: 16 BRPM | SYSTOLIC BLOOD PRESSURE: 148 MMHG | OXYGEN SATURATION: 96 % | HEART RATE: 95 BPM | HEIGHT: 65 IN

## 2024-02-06 DIAGNOSIS — Z98.890 H/O PNEUMONECTOMY: ICD-10-CM

## 2024-02-06 DIAGNOSIS — Z90.2 H/O PNEUMONECTOMY: ICD-10-CM

## 2024-02-06 DIAGNOSIS — R07.9 CHEST PAIN, UNSPECIFIED TYPE: Primary | ICD-10-CM

## 2024-02-06 DIAGNOSIS — I25.10 CORONARY ARTERY DISEASE INVOLVING NATIVE CORONARY ARTERY OF NATIVE HEART WITHOUT ANGINA PECTORIS: ICD-10-CM

## 2024-02-06 DIAGNOSIS — G35 MULTIPLE SCLEROSIS (HCC): ICD-10-CM

## 2024-02-06 PROCEDURE — 99204 OFFICE O/P NEW MOD 45 MIN: CPT | Performed by: INTERNAL MEDICINE

## 2024-02-06 NOTE — PROGRESS NOTES
Cardiology Consultation     Francesca Retana  [id number]  1945  [address] [facility] [address] [address]    HPI:  78-year-old  who lives alone who has multiple sclerosis and uses a walker.  She developed left-sided chest discomfort in the posterior axillary area that was worse with deep breathing.  She went to the ED and testing was negative including CAT scan for pulmonary embolism and she was given Tylenol and the discomfort resolved.  She has not had it since.    She has a history of right pneumonectomy.  She has no history of coronary disease.  She does not get exertionally related discomfort relieved by rest.    She had a pneumonectomy in the past and 1995 for lung cancer.  She had chemotherapy afterwards.    Blood pressures have been good when she goes to her family doctor.    1. Chest pain, unspecified type    2. Coronary artery disease involving native coronary artery of native heart without angina pectoris    3. Multiple sclerosis (HCC)    4. H/O pneumonectomy      There is no problem list on file for this patient.    Past Medical History:   Diagnosis Date    Cancer (HCC)     only has the left lung     MS (multiple sclerosis) (HCC)     Neurogenic bladder     Trigeminal neuralgia      Social History     Socioeconomic History    Marital status:      Spouse name: Not on file    Number of children: Not on file    Years of education: Not on file    Highest education level: Not on file   Occupational History    Not on file   Tobacco Use    Smoking status: Former     Passive exposure: Past (as a teenager growing up)    Smokeless tobacco: Never   Vaping Use    Vaping status: Never Used   Substance and Sexual Activity    Alcohol use: Yes     Comment: Occ.    Drug use: Never    Sexual activity: Not on file   Other Topics Concern    Not on file   Social History Narrative    Not on file  "    Social Determinants of Health     Financial Resource Strain: Not on file   Food Insecurity: Not on file   Transportation Needs: Not on file   Physical Activity: Not on file   Stress: Not on file   Social Connections: Not on file   Intimate Partner Violence: Not on file   Housing Stability: Not on file      Family History   Problem Relation Age of Onset    Cirrhosis Father     No Known Problems Mother     No Known Problems Daughter     No Known Problems Daughter      Past Surgical History:   Procedure Laterality Date    LUNG REMOVAL, TOTAL      right        Current Outpatient Medications:     albuterol (PROVENTIL HFA,VENTOLIN HFA) 90 mcg/act inhaler, Inhale 2 puffs every 6 (six) hours as needed, Disp: , Rfl:     aspirin (ECOTRIN LOW STRENGTH) 81 mg EC tablet, Take 81 mg by mouth daily, Disp: , Rfl:     betamethasone, augmented, (DIPROLENE) 0.05 % ointment, as needed, Disp: , Rfl:     carBAMazepine (TEGretol) 200 mg tablet, Take 1 tablet by mouth 2 (two) times a day, Disp: , Rfl:     Cholecalciferol 50 MCG (2000 UT) CAPS, Take 2 capsules by mouth daily, Disp: , Rfl:     cyanocobalamin (VITAMIN B-12) 100 MCG tablet, Take 100 mcg by mouth daily, Disp: , Rfl:     estradiol (ESTRACE VAGINAL) 0.1 mg/g vaginal cream, Apply pea sized amount around urethra and inside vaginal opening 3 times per week (Patient not taking: Reported on 2/14/2023), Disp: 42.5 g, Rfl: 2  No Known Allergies  Vitals:    02/06/24 1330   BP: 148/88   BP Location: Left arm   Patient Position: Sitting   Cuff Size: Standard   Pulse: 95   Resp: 16   SpO2: 96%   Weight: 60.3 kg (133 lb)   Height: 5' 5\" (1.651 m)       Labs:  Admission on 01/11/2024, Discharged on 01/11/2024   Component Date Value    WBC 01/11/2024 7.98     RBC 01/11/2024 4.04     Hemoglobin 01/11/2024 12.7     Hematocrit 01/11/2024 38.7     MCV 01/11/2024 96     MCH 01/11/2024 31.4     MCHC 01/11/2024 32.8     RDW 01/11/2024 14.4     MPV 01/11/2024 9.6     Platelets 01/11/2024 251     " nRBC 01/11/2024 0     Neutrophils Relative 01/11/2024 75     Immat GRANS % 01/11/2024 0     Lymphocytes Relative 01/11/2024 16     Monocytes Relative 01/11/2024 7     Eosinophils Relative 01/11/2024 2     Basophils Relative 01/11/2024 0     Neutrophils Absolute 01/11/2024 5.89     Immature Grans Absolute 01/11/2024 0.02     Lymphocytes Absolute 01/11/2024 1.29     Monocytes Absolute 01/11/2024 0.58     Eosinophils Absolute 01/11/2024 0.18     Basophils Absolute 01/11/2024 0.02     Sodium 01/11/2024 140     Potassium 01/11/2024 3.2 (L)     Chloride 01/11/2024 104     CO2 01/11/2024 30     ANION GAP 01/11/2024 6     BUN 01/11/2024 18     Creatinine 01/11/2024 0.86     Glucose 01/11/2024 100     Calcium 01/11/2024 9.1     AST 01/11/2024 11 (L)     ALT 01/11/2024 8     Alkaline Phosphatase 01/11/2024 88     Total Protein 01/11/2024 7.2     Albumin 01/11/2024 3.8     Total Bilirubin 01/11/2024 0.72     eGFR 01/11/2024 64     Protime 01/11/2024 12.6     INR 01/11/2024 0.89     PTT 01/11/2024 30     hs TnI 0hr 01/11/2024 7     BNP 01/11/2024 80     D-Dimer, Quant 01/11/2024 1.12 (H)     Ventricular Rate 01/11/2024 135     Atrial Rate 01/11/2024 135     PA Interval 01/11/2024 148     QRSD Interval 01/11/2024 78     QT Interval 01/11/2024 286     QTC Interval 01/11/2024 429     QRS Axis 01/11/2024 51     T Wave Axis 01/11/2024 -33     SARS-CoV-2 01/11/2024 Negative     INFLUENZA A PCR 01/11/2024 Negative     INFLUENZA B PCR 01/11/2024 Negative     RSV PCR 01/11/2024 Negative     Blood Culture 01/11/2024 No Growth After 5 Days.     Blood Culture 01/11/2024 No Growth After 5 Days.     LACTIC ACID 01/11/2024 1.1     hs TnI 2hr 01/11/2024 8     Delta 2hr hsTnI 01/11/2024 1     hs TnI 4hr 01/11/2024 9     Delta 4hr hsTnI 01/11/2024 2      Imaging: CTA ED chest PE Study    Result Date: 1/11/2024  Narrative: CTA - CHEST WITH IV CONTRAST - PULMONARY ANGIOGRAM INDICATION:   Cough, pleuritic chest pain, tachycardia, h/o nsclc,  elevated D-dimer. Non-small cell lung cancer status post right pneumonectomy. COMPARISON: Chest CT 6/20/2022 TECHNIQUE: CTA examination of the chest was performed using angiographic technique according to a protocol specifically tailored to evaluate for pulmonary embolism.  Multiplanar 2D reformatted images were created from the source data. In addition, coronal 3D MIP postprocessing was performed on the acquisition scanner. Radiation dose length product (DLP) for this visit:  186 mGy-cm .  This examination, like all CT scans performed in the [facility], was performed utilizing techniques to minimize radiation dose exposure, including the use of iterative reconstruction and automated exposure control. IV Contrast:  85 mL of iohexol (OMNIPAQUE) FINDINGS: PULMONARY ARTERIAL TREE:  No pulmonary embolus is seen. Chronic eccentric thrombus in the distal right main pulmonary artery remnant related to right pneumonectomy. LUNGS: Postsurgical changes of right pneumonectomy. Left apical scarring. Minimal subpleural atelectasis in the left lower lobe posteriorly. There is no tracheal or endobronchial lesion. Left upper lobe nodule near the left oblique fissure measuring 5 mm (series 6 image 95), unchanged from 6/20/2022. Mild emphysema. PLEURA: Right pneumonectomy. No left pleural effusion. HEART/GREAT VESSELS: Heart is located in the right hemithorax secondary to left lung compensatory hyperinflation. Heart is normal in size. Mild coronary artery atherosclerotic calcifications. No thoracic aortic aneurysm. MEDIASTINUM AND SHOBHA:  Mediastinal structures shifted right secondary to pneumonectomy. CHEST WALL AND LOWER NECK:   Calcified left thyroid nodule. Bilateral breast calcifications. VISUALIZED STRUCTURES IN THE UPPER ABDOMEN: Small hiatal hernia.  Upper pole renal cysts. Atherosclerotic changes are present in the aorta and branching vessels. OSSEOUS STRUCTURES:  No acute fracture or destructive osseous  "lesion.     Impression: 1. No pulmonary embolus. 2. Postsurgical changes of right pneumonectomy. Resident: LETICIA KAPADIA I, the attending radiologist, have reviewed the images and agree with the final report above. Workstation performed: [id number]     XR chest 1 view portable    Result Date: 1/11/2024  Narrative: CHEST INDICATION:   sob. COMPARISON:  None EXAM PERFORMED/VIEWS:  XR CHEST PORTABLE AP semierect Images:  1 FINDINGS: Surgical clips in the right hilar area. Heart shadow is obscured by adjacent opacity. The heart and mediastinal structures are shifted to the right. The left lung is clear. There has been a prior right pneumonectomy. No evidence for effusion or pneumothorax. No acute osseous abnormalities.     Impression: Status post right pneumonectomy. The left lung is clear. Workstation performed: [id number]       Review of Systems:  Otherwise negative    Physical Exam:  Pleasant and alert.  148/88.  Heart rate 90 and regular.  Skin warm and dry.  Pupils equal.  Carotids 2+ without bruits.  Lungs reveal absent breath sounds on the right.  Left lung clear.  Regular rhythm.  No murmurs or gallops.  No organomegaly.  No edema.  Good strength in extremities.    Discussion/Summary:    1.  Noncardiac chest pain most likely secondary to pleurisy or musculoskeletal pain  2.  Coronary artery disease as noted by \"mild calcification\" in coronary arteries without angina pectoris    Recommendations:    1.  Continue aspirin 81 mg daily  2.  I am reluctant to start a statin since she has no symptoms and she has MS and this may aggravate the situation  3.  Return in 1 year            Juan Nuñez MD    "

## 2024-02-20 ENCOUNTER — HOSPITAL ENCOUNTER (OUTPATIENT)
Dept: ULTRASOUND IMAGING | Facility: HOSPITAL | Age: 79
Discharge: HOME | End: 2024-02-20
Attending: UROLOGY
Payer: COMMERCIAL

## 2024-02-20 DIAGNOSIS — N39.0 FREQUENT UTI: ICD-10-CM

## 2024-02-20 PROCEDURE — 76770 US EXAM ABDO BACK WALL COMP: CPT

## 2024-04-18 ENCOUNTER — OFFICE VISIT (OUTPATIENT)
Dept: UROLOGY | Facility: CLINIC | Age: 79
End: 2024-04-18
Payer: COMMERCIAL

## 2024-04-18 VITALS
HEART RATE: 56 BPM | SYSTOLIC BLOOD PRESSURE: 104 MMHG | BODY MASS INDEX: 22.49 KG/M2 | HEIGHT: 65 IN | WEIGHT: 135 LBS | DIASTOLIC BLOOD PRESSURE: 62 MMHG | TEMPERATURE: 97.1 F | OXYGEN SATURATION: 99 %

## 2024-04-18 DIAGNOSIS — N28.1 RENAL CYST: ICD-10-CM

## 2024-04-18 DIAGNOSIS — N39.0 FREQUENT UTI: Primary | ICD-10-CM

## 2024-04-18 DIAGNOSIS — N31.9 NEUROGENIC BLADDER: ICD-10-CM

## 2024-04-18 LAB
BACTERIA UR QL AUTO: ABNORMAL /HPF
BILIRUB UR QL STRIP: NEGATIVE
CLARITY UR: ABNORMAL
COLOR UR: YELLOW
GLUCOSE UR STRIP-MCNC: NEGATIVE MG/DL
HGB UR QL STRIP.AUTO: ABNORMAL
KETONES UR STRIP-MCNC: NEGATIVE MG/DL
LEUKOCYTE ESTERASE UR QL STRIP: ABNORMAL
MUCOUS THREADS UR QL AUTO: ABNORMAL
NITRITE UR QL STRIP: POSITIVE
NON-SQ EPI CELLS URNS QL MICRO: ABNORMAL /HPF
PH UR STRIP.AUTO: 6.5 [PH]
PROT UR STRIP-MCNC: ABNORMAL MG/DL
RBC #/AREA URNS AUTO: ABNORMAL /HPF
SL AMB  POCT GLUCOSE, UA: NORMAL
SL AMB LEUKOCYTE ESTERASE,UA: NORMAL
SL AMB POCT BILIRUBIN,UA: NORMAL
SL AMB POCT BLOOD,UA: NORMAL
SL AMB POCT CLARITY,UA: CLEAR
SL AMB POCT COLOR,UA: YELLOW
SL AMB POCT KETONES,UA: NORMAL
SL AMB POCT NITRITE,UA: NORMAL
SL AMB POCT PH,UA: 6
SL AMB POCT SPECIFIC GRAVITY,UA: 1.01
SL AMB POCT URINE PROTEIN: NORMAL
SL AMB POCT UROBILINOGEN: 0.2
SP GR UR STRIP.AUTO: 1.01 (ref 1–1.03)
TRANS CELLS #/AREA URNS HPF: PRESENT /[HPF]
UROBILINOGEN UR STRIP-ACNC: <2 MG/DL
WBC #/AREA URNS AUTO: ABNORMAL /HPF
WBC CLUMPS # UR AUTO: PRESENT /UL

## 2024-04-18 PROCEDURE — 87086 URINE CULTURE/COLONY COUNT: CPT | Performed by: PHYSICIAN ASSISTANT

## 2024-04-18 PROCEDURE — 99213 OFFICE O/P EST LOW 20 MIN: CPT | Performed by: PHYSICIAN ASSISTANT

## 2024-04-18 PROCEDURE — 81002 URINALYSIS NONAUTO W/O SCOPE: CPT | Performed by: PHYSICIAN ASSISTANT

## 2024-04-18 PROCEDURE — 81001 URINALYSIS AUTO W/SCOPE: CPT | Performed by: PHYSICIAN ASSISTANT

## 2024-04-18 RX ORDER — METHYLCELLULOSE 500 MG/1
2 TABLET ORAL
COMMUNITY
Start: 2024-04-09

## 2024-04-18 RX ORDER — LANOLIN ALCOHOL/MO/W.PET/CERES
400 CREAM (GRAM) TOPICAL
COMMUNITY
Start: 2024-04-09 | End: 2025-04-09

## 2024-04-18 RX ORDER — ESTRADIOL 0.1 MG/G
CREAM VAGINAL
Qty: 42.5 G | Refills: 2 | Status: SHIPPED | OUTPATIENT
Start: 2024-04-18

## 2024-04-18 RX ORDER — METHENAMINE HIPPURATE 1000 MG/1
1 TABLET ORAL 2 TIMES DAILY WITH MEALS
Qty: 180 TABLET | Refills: 1 | Status: SHIPPED | OUTPATIENT
Start: 2024-04-18 | End: 2024-10-15

## 2024-04-18 RX ORDER — CEFUROXIME AXETIL 500 MG/1
TABLET ORAL
COMMUNITY
Start: 2024-03-12 | End: 2024-04-19 | Stop reason: ALTCHOICE

## 2024-04-18 NOTE — PROGRESS NOTES
4/18/2024      Chief Complaint   Patient presents with    Follow-up     Assessment and Plan    Neurogenic bladder  - Secondary to MS  - No longer on Myrbetriq     2. Recurrent UTI   - 2 positive urine cultures since last visit in 12/2023 and 3/2024   - Restart Estrace and start Hiprex  -Recommend adequate hydration, avoiding constipation, cranberry supplementation and probiotics for UTI prevention  - Urine dip today appears positive. She reports the start of some UTI symptoms. Sent out for UA micro and culture, monitor for results.   - Standing urine cultures.     3. Left renal cyst   - US kidney bladder with PVR from 2/20/24 - Mild interval increase in size of the septated cyst in the upper pole the left kidney now measuring 2.5 cm, previously measured 2.1 cm.4 mm nonobstructing right renal calculus. No significant post void residual in the urinary bladder lumen.  - Previously categorized as Bosniak II  - Will monitor with repeat US in 1 year    - Follow up in 6 months for reassessment.     History of Present Illness  Francesca Retana is a 78 y.o. female here for follow up evaluation of above. She continues to have issues with UTIs. Has had two UTIs since last visit. Typical symptoms includes increased urinary frequency and dysuria which improves with abx. Has not been using Estrace recently.     Cystoscopy last  year - The bladder was without mucosal abnormalities except for signs of chronic cystitis cystica particularly at the trigone.     Review of Systems   Constitutional:  Negative for chills and fever.   Respiratory:  Negative for shortness of breath.    Cardiovascular:  Negative for chest pain.   Gastrointestinal:  Negative for abdominal pain.   Genitourinary:  Negative for difficulty urinating, dysuria, flank pain, frequency, hematuria and urgency.   Neurological:  Negative for dizziness.                  Past Medical History  Past Medical History:   Diagnosis Date    Cancer (HCC)     only has the left lung      MS (multiple sclerosis) ([facility])     Neurogenic bladder     Trigeminal neuralgia        Past Social History  Past Surgical History:   Procedure Laterality Date    LUNG REMOVAL, TOTAL      right      Social History     Tobacco Use   Smoking Status Former    Passive exposure: Past (as a teenager growing up)   Smokeless Tobacco Never       Past Family History  Family History   Problem Relation Age of Onset    Cirrhosis Father     No Known Problems Mother     No Known Problems Daughter     No Known Problems Daughter        Past Social history  Social History     Socioeconomic History    Marital status:      Spouse name: Not on file    Number of children: Not on file    Years of education: Not on file    Highest education level: Not on file   Occupational History    Not on file   Tobacco Use    Smoking status: Former     Passive exposure: Past (as a teenager growing up)    Smokeless tobacco: Never   Vaping Use    Vaping status: Never Used   Substance and Sexual Activity    Alcohol use: Yes     Comment: Occ.    Drug use: Never    Sexual activity: Not Currently   Other Topics Concern    Not on file   Social History Narrative    Not on file     Social Determinants of Health     Financial Resource Strain: Low Risk  (3/12/2024)    Received from [facility]    Overall Financial Resource Strain (CARDIA)     Difficulty of Paying Living Expenses: Not very hard   Food Insecurity: No Food Insecurity (3/12/2024)    Received from [facility]    Hunger Vital Sign     Worried About Running Out of Food in the Last Year: Never true     Ran Out of Food in the Last Year: Never true   Transportation Needs: No Transportation Needs (3/12/2024)    Received from [facility]    PRAPARE - Transportation     Lack of Transportation (Medical): No     Lack of Transportation (Non-Medical): No   Physical Activity: Not on file   Stress: Patient Declined (3/12/2024)    Received from [facility]    [facility] of Occupational Health - Occupational Stress Questionnaire     Feeling of Stress : Patient declined   Social Connections: Feeling Socially Integrated (3/12/2024)    Received from [facility]    OASIS : Social Isolation     How often do you feel lonely or isolated from those around you?: Never   Intimate Partner Violence: Not At Risk (3/12/2024)    Received from [facility]    Humiliation, Afraid, Rape, and Kick questionnaire     Fear of Current or Ex-Partner: No     Emotionally Abused: No     Physically Abused: No     Sexually Abused: No   Housing Stability: Low Risk  (3/12/2024)    Received from [facility]    Housing Stability Vital Sign     Unable to Pay for Housing in the Last Year: No     Number of Places Lived in the Last Year: 1     Unstable Housing in the Last Year: No       Current Medications  Current Outpatient Medications   Medication Sig Dispense Refill    albuterol (PROVENTIL HFA,VENTOLIN HFA) 90 mcg/act inhaler Inhale 2 puffs every 6 (six) hours as needed      aspirin (ECOTRIN LOW STRENGTH) 81 mg EC tablet Take 81 mg by mouth daily      betamethasone, augmented, (DIPROLENE) 0.05 % ointment as needed      carBAMazepine (TEGretol) 200 mg tablet Take 1 tablet by mouth 2 (two) times a day      Cholecalciferol 50 MCG (2000 UT) CAPS Take 2 capsules by mouth daily      cyanocobalamin (VITAMIN B-12) 100 MCG tablet Take 100 mcg by mouth daily      magnesium Oxide (MagOx 400) 400 mg TABS Take 400 mg by mouth      methylcellulose (Citrucel) 500 mg tablet Take 2 each by mouth      cefuroxime (CEFTIN) 500 mg tablet take 1 tablet by mouth twice a day for 7 days (Patient not taking: Reported on 4/18/2024)      estradiol (ESTRACE VAGINAL) 0.1 mg/g vaginal cream Apply pea sized amount around urethra and inside vaginal opening 3 times per week (Patient not taking: Reported on 2/14/2023) 42.5 g 2     No current  "facility-administered medications for this visit.       Allergies  No Known Allergies      The following portions of the patient's history were reviewed and updated as appropriate: allergies, current medications, past medical history, past social history, past surgical history and problem list.      Vitals  Vitals:    04/18/24 1038   BP: 104/62   Pulse: 56   Temp: (!) 97.1 °F (36.2 °C)   TempSrc: Temporal   SpO2: 99%   Weight: 61.2 kg (135 lb)   Height: 5' 5\" (1.651 m)           Physical Exam  Physical Exam  Constitutional:       Appearance: Normal appearance.   HENT:      Head: Normocephalic and atraumatic.      Right Ear: External ear normal.      Left Ear: External ear normal.      Nose: Nose normal.   Eyes:      General: No scleral icterus.     Conjunctiva/sclera: Conjunctivae normal.   Cardiovascular:      Pulses: Normal pulses.   Pulmonary:      Effort: Pulmonary effort is normal.   Musculoskeletal:         General: Normal range of motion.      Cervical back: Normal range of motion.   Neurological:      General: No focal deficit present.      Mental Status: She is alert and oriented to person, place, and time.   Psychiatric:         Mood and Affect: Mood normal.         Behavior: Behavior normal.         Thought Content: Thought content normal.         Judgment: Judgment normal.           Results  Recent Results (from the past 1 hour(s))   POCT urine dip    Collection Time: 04/18/24 10:35 AM   Result Value Ref Range    LEUKOCYTE ESTERASE,UA ++     NITRITE,UA ++     SL AMB POCT UROBILINOGEN 0.2     POCT URINE PROTEIN ++      PH,UA 6.0     BLOOD,UA +     SPECIFIC GRAVITY,UA 1.015     KETONES,UA -     BILIRUBIN,UA -     GLUCOSE, UA -      COLOR,UA Yellow     CLARITY,UA Clear    ]  No results found for: \"PSA\"  Lab Results   Component Value Date    CALCIUM 9.1 01/11/2024    K 3.2 (L) 01/11/2024    CO2 30 01/11/2024     01/11/2024    BUN 18 01/11/2024    CREATININE 0.86 01/11/2024     Lab Results   Component " Value Date    WBC 7.98 01/11/2024    HGB 12.7 01/11/2024    HCT 38.7 01/11/2024    MCV 96 01/11/2024     01/11/2024           Orders  Orders Placed This Encounter   Procedures    POCT urine dip     Carlee Birch

## 2024-04-19 ENCOUNTER — TELEPHONE (OUTPATIENT)
Dept: UROLOGY | Facility: CLINIC | Age: 79
End: 2024-04-19

## 2024-04-19 DIAGNOSIS — N39.0 URINARY TRACT INFECTION WITHOUT HEMATURIA, SITE UNSPECIFIED: Primary | ICD-10-CM

## 2024-04-19 RX ORDER — CEPHALEXIN 500 MG/1
500 CAPSULE ORAL EVERY 12 HOURS SCHEDULED
Qty: 14 CAPSULE | Refills: 0 | Status: SHIPPED | OUTPATIENT
Start: 2024-04-19 | End: 2024-04-26

## 2024-04-19 NOTE — TELEPHONE ENCOUNTER
Spoke with patient via CC to make her aware of positive Prelim culture results, abx sent to pharmacy, and possible adjustment of abx therapy once final culture results come in. Patient verbalizes understanding at this time.         ----- Message from Carlee Birch PA-C sent at 4/19/2024  1:04 PM EDT -----  Prelim urine cx positive. Abx sent to pharmacy given weekend. May need to adjust based on final culture

## 2024-04-20 LAB — BACTERIA UR CULT: NORMAL

## 2024-04-22 ENCOUNTER — TELEPHONE (OUTPATIENT)
Dept: UROLOGY | Facility: CLINIC | Age: 79
End: 2024-04-22

## 2024-04-22 NOTE — TELEPHONE ENCOUNTER
LVM per cc relaying Carlee GUZMAN message     Urine culture negative, she can d/c abx.  Provided office number     ----- Message from Carlee Birch PA-C sent at 4/22/2024 10:47 AM EDT -----  Urine culture negative, she can d/c abx

## 2024-06-27 ENCOUNTER — APPOINTMENT (OUTPATIENT)
Dept: LAB | Facility: HOSPITAL | Age: 79
End: 2024-06-27
Payer: COMMERCIAL

## 2024-06-27 DIAGNOSIS — N39.0 FREQUENT UTI: ICD-10-CM

## 2024-06-27 PROCEDURE — 87186 SC STD MICRODIL/AGAR DIL: CPT

## 2024-06-27 PROCEDURE — 87086 URINE CULTURE/COLONY COUNT: CPT

## 2024-06-27 PROCEDURE — 87077 CULTURE AEROBIC IDENTIFY: CPT

## 2024-06-28 ENCOUNTER — TELEPHONE (OUTPATIENT)
Dept: UROLOGY | Facility: CLINIC | Age: 79
End: 2024-06-28

## 2024-06-28 DIAGNOSIS — N39.0 URINARY TRACT INFECTION WITHOUT HEMATURIA, SITE UNSPECIFIED: Primary | ICD-10-CM

## 2024-06-28 RX ORDER — AMOXICILLIN AND CLAVULANATE POTASSIUM 500; 125 MG/1; MG/1
1 TABLET, FILM COATED ORAL EVERY 12 HOURS SCHEDULED
Qty: 14 TABLET | Refills: 0 | Status: SHIPPED | OUTPATIENT
Start: 2024-06-28 | End: 2024-07-05

## 2024-06-28 NOTE — TELEPHONE ENCOUNTER
Attempted to call patient to make her aware of positive urine culture and abx therapy being sent to pharmacy as per Carlee GUZMAN. Left detailed voicemail per CC and provided office phone number for any further questions or concerns.    If patient calls back please relay message accordingly. Thank you!

## 2024-06-28 NOTE — TELEPHONE ENCOUNTER
----- Message from Carlee Birch PA-C sent at 6/28/2024 12:36 PM EDT -----  Prelim urine cx positive. Abx sent to pharmacy. May need adjustment based on final culture

## 2024-06-29 LAB
BACTERIA UR CULT: ABNORMAL
BACTERIA UR CULT: ABNORMAL

## 2024-10-11 ENCOUNTER — TELEPHONE (OUTPATIENT)
Age: 79
End: 2024-10-11

## 2024-10-11 NOTE — TELEPHONE ENCOUNTER
Patient has a follow up on 10/21/2024 with Carlee Birch PA-C at [facility]. Patient calling as she had a CT scan that shows a mass concerning for renal cell carcinoma. Unfortunately I can only see report in system, I do not see that we have imaging. Called [phone] and left a message to please upload imaging and to please call me back as we are inquiring if we can call this number to upload imaging from any [facility] or if it only for [facility].     CTA abdomen pelvis w wo contrast  Order: [id number]  Impression    IMPRESSION:  1.  1.7 cm enhancing mass in the left posterior mid pole, concerning for renal  cell carcinoma. Urology evaluation is recommended.    2.  No evidence of metastatic disease in the abdomen or pelvis.  3.  Irregular wall thickening and trabeculation throughout the urinary bladder,  which may be secondary to chronic outlet obstruction.  4.  Status post right pneumonectomy with small ex vacuo pleural effusion,  incompletely imaged.  5.  2.3 cm bilocular cyst in the left ovary, decreased in size from 5/3/2017.  6.  Small hiatal hernia.    Dr. Duffy discussed the above findings with Dr. Thompson by telephone at [phone] on  10/9/2024    Significant Findings: [place] Act [place]

## 2024-10-15 ENCOUNTER — HOSPITAL ENCOUNTER (INPATIENT)
Facility: HOSPITAL | Age: 79
LOS: 4 days | Discharge: HOME HEALTH | DRG: 872 | End: 2024-10-19
Attending: EMERGENCY MEDICINE | Admitting: FAMILY MEDICINE
Payer: COMMERCIAL

## 2024-10-15 ENCOUNTER — APPOINTMENT (EMERGENCY)
Dept: CT IMAGING | Facility: HOSPITAL | Age: 79
DRG: 872 | End: 2024-10-15
Payer: COMMERCIAL

## 2024-10-15 DIAGNOSIS — N28.89 LEFT RENAL MASS: ICD-10-CM

## 2024-10-15 DIAGNOSIS — N12 PYELONEPHRITIS: Primary | ICD-10-CM

## 2024-10-15 DIAGNOSIS — R78.81 BACTEREMIA DUE TO GRAM-NEGATIVE BACTERIA: ICD-10-CM

## 2024-10-15 DIAGNOSIS — N39.0 FREQUENT UTI: ICD-10-CM

## 2024-10-15 DIAGNOSIS — N12 PYELONEPHRITIS OF RIGHT KIDNEY: ICD-10-CM

## 2024-10-15 DIAGNOSIS — A41.9 SEPSIS, DUE TO UNSPECIFIED ORGANISM, UNSPECIFIED WHETHER ACUTE ORGAN DYSFUNCTION PRESENT (HCC): ICD-10-CM

## 2024-10-15 PROBLEM — G50.0 TRIGEMINAL NEURALGIA: Status: ACTIVE | Noted: 2024-10-15

## 2024-10-15 PROBLEM — Z85.118 HISTORY OF LUNG CANCER: Status: ACTIVE | Noted: 2024-10-15

## 2024-10-15 PROBLEM — G35 MULTIPLE SCLEROSIS (HCC): Status: ACTIVE | Noted: 2024-10-15

## 2024-10-15 LAB
ANION GAP SERPL CALCULATED.3IONS-SCNC: 11 MMOL/L (ref 4–13)
BACTERIA UR QL AUTO: ABNORMAL /HPF
BASOPHILS # BLD MANUAL: 0 THOUSAND/UL (ref 0–0.1)
BASOPHILS NFR MAR MANUAL: 0 % (ref 0–1)
BILIRUB UR QL STRIP: NEGATIVE
BUDDING YEAST: PRESENT
BUN SERPL-MCNC: 22 MG/DL (ref 5–25)
CALCIUM SERPL-MCNC: 8.8 MG/DL (ref 8.4–10.2)
CHLORIDE SERPL-SCNC: 104 MMOL/L (ref 96–108)
CLARITY UR: ABNORMAL
CO2 SERPL-SCNC: 25 MMOL/L (ref 21–32)
COLOR UR: ABNORMAL
CREAT SERPL-MCNC: 1.11 MG/DL (ref 0.6–1.3)
EOSINOPHIL # BLD MANUAL: 0 THOUSAND/UL (ref 0–0.4)
EOSINOPHIL NFR BLD MANUAL: 0 % (ref 0–6)
ERYTHROCYTE [DISTWIDTH] IN BLOOD BY AUTOMATED COUNT: 14.1 % (ref 11.6–15.1)
GFR SERPL CREATININE-BSD FRML MDRD: 47 ML/MIN/1.73SQ M
GLUCOSE SERPL-MCNC: 150 MG/DL (ref 65–140)
GLUCOSE UR STRIP-MCNC: NEGATIVE MG/DL
HCT VFR BLD AUTO: 37 % (ref 34.8–46.1)
HGB BLD-MCNC: 11.7 G/DL (ref 11.5–15.4)
HGB UR QL STRIP.AUTO: ABNORMAL
KETONES UR STRIP-MCNC: ABNORMAL MG/DL
LACTATE SERPL-SCNC: 2.3 MMOL/L (ref 0.5–2)
LEUKOCYTE ESTERASE UR QL STRIP: ABNORMAL
LYMPHOCYTES # BLD AUTO: 0.38 THOUSAND/UL (ref 0.6–4.47)
LYMPHOCYTES # BLD AUTO: 5 % (ref 14–44)
MCH RBC QN AUTO: 30.9 PG (ref 26.8–34.3)
MCHC RBC AUTO-ENTMCNC: 31.6 G/DL (ref 31.4–37.4)
MCV RBC AUTO: 98 FL (ref 82–98)
MONOCYTES # BLD AUTO: 0.15 THOUSAND/UL (ref 0–1.22)
MONOCYTES NFR BLD: 2 % (ref 4–12)
NEUTROPHILS # BLD MANUAL: 7.01 THOUSAND/UL (ref 1.85–7.62)
NEUTS BAND NFR BLD MANUAL: 1 % (ref 0–8)
NEUTS SEG NFR BLD AUTO: 92 % (ref 43–75)
NITRITE UR QL STRIP: POSITIVE
NON-SQ EPI CELLS URNS QL MICRO: ABNORMAL /HPF
PH UR STRIP.AUTO: 7 [PH]
PLATELET # BLD AUTO: 201 THOUSANDS/UL (ref 149–390)
PLATELET BLD QL SMEAR: ADEQUATE
PMV BLD AUTO: 10 FL (ref 8.9–12.7)
POTASSIUM SERPL-SCNC: 3.5 MMOL/L (ref 3.5–5.3)
PROT UR STRIP-MCNC: NEGATIVE MG/DL
RBC # BLD AUTO: 3.79 MILLION/UL (ref 3.81–5.12)
RBC #/AREA URNS AUTO: ABNORMAL /HPF
RBC MORPH BLD: NORMAL
SODIUM SERPL-SCNC: 140 MMOL/L (ref 135–147)
SP GR UR STRIP.AUTO: 1.01 (ref 1–1.03)
UROBILINOGEN UR STRIP-ACNC: <2 MG/DL
WBC # BLD AUTO: 7.54 THOUSAND/UL (ref 4.31–10.16)
WBC #/AREA URNS AUTO: ABNORMAL /HPF

## 2024-10-15 PROCEDURE — 87186 SC STD MICRODIL/AGAR DIL: CPT | Performed by: EMERGENCY MEDICINE

## 2024-10-15 PROCEDURE — 93005 ELECTROCARDIOGRAM TRACING: CPT

## 2024-10-15 PROCEDURE — 85027 COMPLETE CBC AUTOMATED: CPT | Performed by: EMERGENCY MEDICINE

## 2024-10-15 PROCEDURE — 36415 COLL VENOUS BLD VENIPUNCTURE: CPT | Performed by: EMERGENCY MEDICINE

## 2024-10-15 PROCEDURE — 96361 HYDRATE IV INFUSION ADD-ON: CPT

## 2024-10-15 PROCEDURE — 85007 BL SMEAR W/DIFF WBC COUNT: CPT | Performed by: EMERGENCY MEDICINE

## 2024-10-15 PROCEDURE — 87040 BLOOD CULTURE FOR BACTERIA: CPT | Performed by: EMERGENCY MEDICINE

## 2024-10-15 PROCEDURE — 99284 EMERGENCY DEPT VISIT MOD MDM: CPT

## 2024-10-15 PROCEDURE — 87086 URINE CULTURE/COLONY COUNT: CPT | Performed by: EMERGENCY MEDICINE

## 2024-10-15 PROCEDURE — 83605 ASSAY OF LACTIC ACID: CPT | Performed by: EMERGENCY MEDICINE

## 2024-10-15 PROCEDURE — 87077 CULTURE AEROBIC IDENTIFY: CPT | Performed by: EMERGENCY MEDICINE

## 2024-10-15 PROCEDURE — 96375 TX/PRO/DX INJ NEW DRUG ADDON: CPT

## 2024-10-15 PROCEDURE — 81001 URINALYSIS AUTO W/SCOPE: CPT | Performed by: EMERGENCY MEDICINE

## 2024-10-15 PROCEDURE — 96365 THER/PROPH/DIAG IV INF INIT: CPT

## 2024-10-15 PROCEDURE — 87154 CUL TYP ID BLD PTHGN 6+ TRGT: CPT | Performed by: EMERGENCY MEDICINE

## 2024-10-15 PROCEDURE — 99285 EMERGENCY DEPT VISIT HI MDM: CPT | Performed by: EMERGENCY MEDICINE

## 2024-10-15 PROCEDURE — 99223 1ST HOSP IP/OBS HIGH 75: CPT | Performed by: INTERNAL MEDICINE

## 2024-10-15 PROCEDURE — 74176 CT ABD & PELVIS W/O CONTRAST: CPT

## 2024-10-15 PROCEDURE — 80048 BASIC METABOLIC PNL TOTAL CA: CPT | Performed by: EMERGENCY MEDICINE

## 2024-10-15 RX ORDER — ACETAMINOPHEN 325 MG/1
650 TABLET ORAL EVERY 6 HOURS PRN
Status: DISCONTINUED | OUTPATIENT
Start: 2024-10-15 | End: 2024-10-19 | Stop reason: HOSPADM

## 2024-10-15 RX ORDER — MORPHINE SULFATE 4 MG/ML
4 INJECTION, SOLUTION INTRAMUSCULAR; INTRAVENOUS ONCE
Status: COMPLETED | OUTPATIENT
Start: 2024-10-15 | End: 2024-10-15

## 2024-10-15 RX ORDER — ONDANSETRON 2 MG/ML
1 INJECTION INTRAMUSCULAR; INTRAVENOUS ONCE
Status: COMPLETED | OUTPATIENT
Start: 2024-10-15 | End: 2024-10-15

## 2024-10-15 RX ORDER — ONDANSETRON 2 MG/ML
4 INJECTION INTRAMUSCULAR; INTRAVENOUS EVERY 8 HOURS PRN
Status: DISCONTINUED | OUTPATIENT
Start: 2024-10-15 | End: 2024-10-19 | Stop reason: HOSPADM

## 2024-10-15 RX ADMIN — MORPHINE SULFATE 4 MG: 4 INJECTION INTRAVENOUS at 19:56

## 2024-10-15 RX ADMIN — CEFTRIAXONE SODIUM 1000 MG: 10 INJECTION, POWDER, FOR SOLUTION INTRAVENOUS at 19:48

## 2024-10-15 RX ADMIN — SODIUM CHLORIDE 1000 ML: 0.9 INJECTION, SOLUTION INTRAVENOUS at 18:14

## 2024-10-15 NOTE — ED PROVIDER NOTES
Time reflects when diagnosis was documented in both MDM as applicable and the Disposition within this note       Time User Action Codes Description Comment    10/15/2024  8:31 PM MontalvoFrank Add [N12] Pyelonephritis           ED Disposition       ED Disposition   Admit    Condition   Stable    Date/Time   Tue Oct 15, 2024  8:31 PM    Comment   Case was discussed with Dr. Moore and the patient's admission status was agreed to be Admission Status: inpatient status to the service of Dr. Moore .               Assessment & Plan       Medical Decision Making  78-year-old female, presenting with right flank pain, nausea, and dysuria.  Differential diagnosis includes pyelonephritis, renal colic, muscle strain among other diagnoses.  Labs, EKG, CT scan ordered, will start IV fluids.    Patient with infected urine, CT scan with findings of right-sided pyelonephritis.  IV antibiotics and pain medication given in ED, discussed with hospitalist, patient to be admitted for further monitoring and treatment.    Amount and/or Complexity of Data Reviewed  External Data Reviewed: radiology.     Details: CTA Abd/Pelvis 10/8:  IMPRESSION:   1. 1.7 cm enhancing mass in the left posterior mid pole, concerning for renal   cell carcinoma. Urology evaluation is recommended.    2.  No evidence of metastatic disease in the abdomen or pelvis.   3.  Irregular wall thickening and trabeculation throughout the urinary bladder,   which may be secondary to chronic outlet obstruction.   4.  Status post right pneumonectomy with small ex vacuo pleural effusion,   incompletely imaged.   5.  2.3 cm bilocular cyst in the left ovary, decreased in size from 5/3/2017.   6.  Small hiatal hernia.     Labs: ordered. Decision-making details documented in ED Course.  Radiology: ordered.  ECG/medicine tests: ordered and independent interpretation performed. Decision-making details documented in ED Course.    Risk  Prescription drug management.  Parenteral  controlled substances.        ED Course as of 10/15/24 2034   Tue Oct 15, 2024   2022 Urinalysis with signs of infection, patient started on IV antibiotics, given IV morphine for pain.       Medications   ondansetron (FOR EMS ONLY) (ZOFRAN) 4 mg/2 mL injection 4 mg (0 mg Does not apply Given to EMS 10/15/24 1814)   sodium chloride 0.9 % bolus 1,000 mL (0 mL Intravenous Stopped 10/15/24 2009)   ceftriaxone (ROCEPHIN) 1 g/50 mL in dextrose IVPB (1,000 mg Intravenous New Bag 10/15/24 1948)   morphine injection 4 mg (4 mg Intravenous Given 10/15/24 1956)       ED Risk Strat Scores                           SBIRT 20yo+      Flowsheet Row Most Recent Value   Initial Alcohol Screen: US AUDIT-C     1. How often do you have a drink containing alcohol? 0 Filed at: 10/15/2024 1801   2. How many drinks containing alcohol do you have on a typical day you are drinking?  0 Filed at: 10/15/2024 1801   3b. FEMALE Any Age, or MALE 65+: How often do you have 4 or more drinks on one occassion? 0 Filed at: 10/15/2024 1801   Audit-C Score 0 Filed at: 10/15/2024 1801   LIA: How many times in the past year have you...    Used an illegal drug or used a prescription medication for non-medical reasons? Never Filed at: 10/15/2024 1801                            History of Present Illness       Chief Complaint   Patient presents with    Flank Pain     Patient states at 12 o'clock she stated to get nauseas and states she became incontinent and started to burn when urinating 2 days ago. Patient can't keep any food down and keeps vomiting and has lower flank pain.       Past Medical History:   Diagnosis Date    Cancer (HCC)     only has the left lung     MS (multiple sclerosis) (HCC)     Neurogenic bladder     Trigeminal neuralgia       Past Surgical History:   Procedure Laterality Date    LUNG REMOVAL, TOTAL      right       Family History   Problem Relation Age of Onset    Cirrhosis Father     No Known Problems Mother     No Known Problems  Daughter     No Known Problems Daughter       Social History     Tobacco Use    Smoking status: Former     Passive exposure: Past (as a teenager growing up)    Smokeless tobacco: Never   Vaping Use    Vaping status: Never Used   Substance Use Topics    Alcohol use: Yes     Comment: Occ.    Drug use: Never      E-Cigarette/Vaping    E-Cigarette Use Never User       E-Cigarette/Vaping Substances    Nicotine No     THC No     CBD No TINCTURE    Flavoring No     Other No     Unknown No       I have reviewed and agree with the history as documented.     78-year-old female, presenting with nausea and pain in right lower back starting earlier today.  Patient reports pain in right lower back, constant with no modifying factors and associated nausea.  Patient states for the past few days she has had urinary frequency and burning pain with urination.  Denies any fevers.  Had recent CT scan showing left kidney mass, has follow-up scheduled with urology.      History provided by:  Patient   used: No    Flank Pain  Associated symptoms: nausea        Review of Systems   Constitutional: Negative.    Respiratory: Negative.     Cardiovascular: Negative.    Gastrointestinal:  Positive for nausea.   Genitourinary:  Positive for flank pain.           Objective       ED Triage Vitals [10/15/24 1757]   Temperature Pulse Blood Pressure Respirations SpO2 Patient Position - Orthostatic VS   98.3 °F (36.8 °C) (!) 112 141/83 20 94 % Lying      Temp Source Heart Rate Source BP Location FiO2 (%) Pain Score    Oral Monitor Right arm -- 8      Vitals      Date and Time Temp Pulse SpO2 Resp BP Pain Score FACES Pain Rating User   10/15/24 1959 -- 113 93 % 17 165/76 8 --    10/15/24 1956 -- -- -- -- -- 10 - Worst Possible Pain --    10/15/24 1900 -- 111 -- 20 138/65 -- -- TW   10/15/24 1757 98.3 °F (36.8 °C) 112 94 % 20 141/83 8 -- BS            Physical Exam  Vitals and nursing note reviewed.   Constitutional:       General:  She is not in acute distress.  HENT:      Head: Normocephalic and atraumatic.      Mouth/Throat:      Mouth: Mucous membranes are moist.      Pharynx: Oropharynx is clear.   Cardiovascular:      Rate and Rhythm: Regular rhythm. Tachycardia present.   Pulmonary:      Effort: Pulmonary effort is normal.      Breath sounds: Normal breath sounds.   Abdominal:      Palpations: Abdomen is soft.      Tenderness: There is no abdominal tenderness. There is no right CVA tenderness or left CVA tenderness.   Musculoskeletal:         General: Normal range of motion.   Skin:     General: Skin is warm and dry.   Neurological:      General: No focal deficit present.      Mental Status: She is alert and oriented to person, place, and time.         Results Reviewed       Procedure Component Value Units Date/Time    Lactic acid, plasma (w/reflex if result > 2.0) [[id number]]  (Abnormal) Collected: 10/1943    Lab Status: Final result Specimen: Blood from Arm, Left Updated: 10/15/24 2007     LACTIC ACID 2.3 mmol/L     Narrative:      Result may be elevated if tourniquet was used during collection.    Lactic acid 2 Hours [[id number]]     Lab Status: No result Specimen: Blood     Blood culture #1 [[id number]] Collected: 10/15/24 1945    Lab Status: In process Specimen: Blood from Arm, Right Updated: 10/15/24 1949    Blood culture #2 [[id number]] Collected: 10/1943    Lab Status: In process Specimen: Blood from Arm, Left Updated: 10/15/24 1949    RBC Morphology Reflex Test [[id number]] Collected: 10/15/24 1814    Lab Status: Final result Specimen: Blood from Arm, Left Updated: 10/15/24 1901    Urine Microscopic [[id number]]  (Abnormal) Collected: 10/15/24 1837    Lab Status: Final result Specimen: Urine, Clean Catch Updated: 10/15/24 1851     RBC, UA 30-50 /hpf      WBC, UA Innumerable /hpf      Epithelial Cells Occasional /hpf      Bacteria, UA Innumerable /hpf      Budding Yeast Present    Urine culture [[id number]] Collected:  10/15/24 1837    Lab Status: In process Specimen: Urine, Clean Catch Updated: 10/15/24 1851    UA w Reflex to Microscopic w Reflex to Culture [[id number]]  (Abnormal) Collected: 10/15/24 1837    Lab Status: Final result Specimen: Urine, Clean Catch Updated: 10/15/24 1845     Color, UA Dark Yellow     Clarity, UA Turbid     Specific Gravity, UA 1.009     pH, UA 7.0     Leukocytes, UA Large     Nitrite, UA Positive     Protein, UA Negative mg/dl      Glucose, UA Negative mg/dl      Ketones, UA Trace mg/dl      Urobilinogen, UA <2.0 mg/dl      Bilirubin, UA Negative     Occult Blood, UA Moderate    CBC and differential [[id number]]  (Abnormal) Collected: 10/15/24 1814    Lab Status: Final result Specimen: Blood from Arm, Left Updated: 10/15/24 1842     WBC 7.54 Thousand/uL      RBC 3.79 Million/uL      Hemoglobin 11.7 g/dL      Hematocrit 37.0 %      MCV 98 fL      MCH 30.9 pg      MCHC 31.6 g/dL      RDW 14.1 %      MPV 10.0 fL      Platelets 201 Thousands/uL     Narrative:      This is an appended report.  These results have been appended to a previously verified report.    Manual Differential(PHLEBS Do Not Order) [[id number]]  (Abnormal) Collected: 10/15/24 1814    Lab Status: Final result Specimen: Blood from Arm, Left Updated: 10/15/24 1842     Segmented % 92 %      Bands % 1 %      Lymphocytes % 5 %      Monocytes % 2 %      Eosinophils % 0 %      Basophils % 0 %      Absolute Neutrophils 7.01 Thousand/uL      Absolute Lymphocytes 0.38 Thousand/uL      Absolute Monocytes 0.15 Thousand/uL      Absolute Eosinophils 0.00 Thousand/uL      Absolute Basophils 0.00 Thousand/uL      Total Counted --     RBC Morphology Normal     Platelet Estimate Adequate    Basic metabolic panel [[id number]]  (Abnormal) Collected: 10/15/24 1814    Lab Status: Final result Specimen: Blood from Arm, Left Updated: 10/15/24 1838     Sodium 140 mmol/L      Potassium 3.5 mmol/L      Chloride 104 mmol/L      CO2 25 mmol/L      ANION GAP 11 mmol/L       BUN 22 mg/dL      Creatinine 1.11 mg/dL      Glucose 150 mg/dL      Calcium 8.8 mg/dL      eGFR 47 ml/min/1.73sq m     Narrative:      [employer] guidelines for Chronic Kidney Disease (CKD):     Stage 1 with normal or high GFR (GFR > 90 mL/min/1.73 square meters)    Stage 2 Mild CKD (GFR = 60-89 mL/min/1.73 square meters)    Stage 3A Moderate CKD (GFR = 45-59 mL/min/1.73 square meters)    Stage 3B Moderate CKD (GFR = 30-44 mL/min/1.73 square meters)    Stage 4 Severe CKD (GFR = 15-29 mL/min/1.73 square meters)    Stage 5 End Stage CKD (GFR <15 mL/min/1.73 square meters)  Note: GFR calculation is accurate only with a steady state creatinine            CT renal stone study abdomen pelvis wo contrast   Final Interpretation by Owen Dumont MD (10/15 2013)      1.  Mild right hydroureteronephrosis, with diffuse right perinephric and periureteral stranding. There is also diffuse urinary bladder wall thickening and surrounding stranding. This suggests a cystitis and findings concerning for right pyelonephritis.   2.  Colonic diverticulosis.   3.  Punctate nonobstructing left renal calculus.      Workstation performed: [id number]             ECG 12 Lead Documentation Only    Date/Time: 10/15/2024 6:26 PM    Performed by: Frank Montalvo MD  Authorized by: Frank Montalvo MD    ECG reviewed by me, the ED Provider: yes    Patient location:  ED  Previous ECG:     Previous ECG:  Compared to current  Rate:     ECG rate assessment: tachycardic    Rhythm:     Rhythm: sinus rhythm    Ectopy:     Ectopy: none    QRS:     QRS axis:  Normal    QRS intervals:  Normal  Conduction:     Conduction: normal    ST segments:     ST segments:  Normal  Comments:      Sinus tachycardia 112, otherwise normal      ED Medication and Procedure Management   Prior to Admission Medications   Prescriptions Last Dose Informant Patient Reported? Taking?   Cholecalciferol 50 MCG (2000 UT) CAPS  Self Yes No   Sig: Take  2 capsules by mouth daily   albuterol (PROVENTIL HFA,VENTOLIN HFA) 90 mcg/act inhaler  Self Yes No   Sig: Inhale 2 puffs every 6 (six) hours as needed   aspirin (ECOTRIN LOW STRENGTH) 81 mg EC tablet  Self Yes No   Sig: Take 81 mg by mouth daily   betamethasone, augmented, (DIPROLENE) 0.05 % ointment  Self Yes No   Sig: as needed   carBAMazepine (TEGretol) 200 mg tablet  Self Yes No   Sig: Take 1 tablet by mouth 2 (two) times a day   cyanocobalamin (VITAMIN B-12) 100 MCG tablet  Self Yes No   Sig: Take 100 mcg by mouth daily   estradiol (ESTRACE VAGINAL) 0.1 mg/g vaginal cream   No No   Sig: Apply pea sized amount around urethra and inside vaginal opening 3 times per week   magnesium Oxide (MagOx 400) 400 mg TABS  Self Yes No   Sig: Take 400 mg by mouth   methenamine hippurate (HIPREX) 1 g tablet   No No   Sig: Take 1 tablet (1 g total) by mouth 2 (two) times a day with meals   methylcellulose (Citrucel) 500 mg tablet  Self Yes No   Sig: Take 2 each by mouth      Facility-Administered Medications: None     Patient's Medications   Discharge Prescriptions    No medications on file     No discharge procedures on file.  ED SEPSIS DOCUMENTATION   Time reflects when diagnosis was documented in both MDM as applicable and the Disposition within this note       Time User Action Codes Description Comment    10/15/2024  8:31 PM Frank Montalvo Add [N12] Pyelonephritis                  Frank Montalvo MD  10/15/24 2034

## 2024-10-16 PROBLEM — R78.81 GRAM-NEGATIVE BACTEREMIA: Status: ACTIVE | Noted: 2024-10-16

## 2024-10-16 PROBLEM — N31.9 NEUROGENIC BLADDER: Status: ACTIVE | Noted: 2024-10-16

## 2024-10-16 PROBLEM — N39.0 RECURRENT URINARY TRACT INFECTION: Status: ACTIVE | Noted: 2024-10-16

## 2024-10-16 PROBLEM — A41.9 SEPSIS (HCC): Status: ACTIVE | Noted: 2024-10-16

## 2024-10-16 LAB
ANION GAP SERPL CALCULATED.3IONS-SCNC: 8 MMOL/L (ref 4–13)
ATRIAL RATE: 112 BPM
BUN SERPL-MCNC: 21 MG/DL (ref 5–25)
CALCIUM SERPL-MCNC: 8 MG/DL (ref 8.4–10.2)
CHLORIDE SERPL-SCNC: 105 MMOL/L (ref 96–108)
CO2 SERPL-SCNC: 26 MMOL/L (ref 21–32)
CREAT SERPL-MCNC: 0.99 MG/DL (ref 0.6–1.3)
ERYTHROCYTE [DISTWIDTH] IN BLOOD BY AUTOMATED COUNT: 14.3 % (ref 11.6–15.1)
GFR SERPL CREATININE-BSD FRML MDRD: 53 ML/MIN/1.73SQ M
GLUCOSE SERPL-MCNC: 139 MG/DL (ref 65–140)
HCT VFR BLD AUTO: 31.9 % (ref 34.8–46.1)
HGB BLD-MCNC: 10.1 G/DL (ref 11.5–15.4)
LACTATE SERPL-SCNC: 1.1 MMOL/L (ref 0.5–2)
MAGNESIUM SERPL-MCNC: 1.4 MG/DL (ref 1.9–2.7)
MCH RBC QN AUTO: 31.3 PG (ref 26.8–34.3)
MCHC RBC AUTO-ENTMCNC: 31.7 G/DL (ref 31.4–37.4)
MCV RBC AUTO: 99 FL (ref 82–98)
NRBC BLD AUTO-RTO: 0 /100 WBCS
P AXIS: 61 DEGREES
PHOSPHATE SERPL-MCNC: 3.5 MG/DL (ref 2.3–4.1)
PLATELET # BLD AUTO: 156 THOUSANDS/UL (ref 149–390)
PMV BLD AUTO: 9.9 FL (ref 8.9–12.7)
POTASSIUM SERPL-SCNC: 3.6 MMOL/L (ref 3.5–5.3)
PR INTERVAL: 182 MS
QRS AXIS: 65 DEGREES
QRSD INTERVAL: 80 MS
QT INTERVAL: 324 MS
QTC INTERVAL: 442 MS
RBC # BLD AUTO: 3.23 MILLION/UL (ref 3.81–5.12)
SODIUM SERPL-SCNC: 139 MMOL/L (ref 135–147)
T WAVE AXIS: 74 DEGREES
VENTRICULAR RATE: 112 BPM
WBC # BLD AUTO: 21.2 THOUSAND/UL (ref 4.31–10.16)

## 2024-10-16 PROCEDURE — 99232 SBSQ HOSP IP/OBS MODERATE 35: CPT | Performed by: NURSE PRACTITIONER

## 2024-10-16 PROCEDURE — 36415 COLL VENOUS BLD VENIPUNCTURE: CPT | Performed by: EMERGENCY MEDICINE

## 2024-10-16 PROCEDURE — 85027 COMPLETE CBC AUTOMATED: CPT | Performed by: INTERNAL MEDICINE

## 2024-10-16 PROCEDURE — 99223 1ST HOSP IP/OBS HIGH 75: CPT | Performed by: INTERNAL MEDICINE

## 2024-10-16 PROCEDURE — 84100 ASSAY OF PHOSPHORUS: CPT | Performed by: INTERNAL MEDICINE

## 2024-10-16 PROCEDURE — 80048 BASIC METABOLIC PNL TOTAL CA: CPT | Performed by: INTERNAL MEDICINE

## 2024-10-16 PROCEDURE — 99223 1ST HOSP IP/OBS HIGH 75: CPT

## 2024-10-16 PROCEDURE — 83735 ASSAY OF MAGNESIUM: CPT | Performed by: INTERNAL MEDICINE

## 2024-10-16 PROCEDURE — 83605 ASSAY OF LACTIC ACID: CPT | Performed by: EMERGENCY MEDICINE

## 2024-10-16 PROCEDURE — 93010 ELECTROCARDIOGRAM REPORT: CPT | Performed by: INTERNAL MEDICINE

## 2024-10-16 RX ORDER — ENOXAPARIN SODIUM 100 MG/ML
40 INJECTION SUBCUTANEOUS DAILY
Status: DISCONTINUED | OUTPATIENT
Start: 2024-10-16 | End: 2024-10-19 | Stop reason: HOSPADM

## 2024-10-16 RX ORDER — MAGNESIUM HYDROXIDE/ALUMINUM HYDROXICE/SIMETHICONE 120; 1200; 1200 MG/30ML; MG/30ML; MG/30ML
30 SUSPENSION ORAL EVERY 4 HOURS PRN
Status: DISCONTINUED | OUTPATIENT
Start: 2024-10-16 | End: 2024-10-19 | Stop reason: HOSPADM

## 2024-10-16 RX ORDER — CARBAMAZEPINE 200 MG/1
200 TABLET ORAL 2 TIMES DAILY
Status: DISCONTINUED | OUTPATIENT
Start: 2024-10-16 | End: 2024-10-19 | Stop reason: HOSPADM

## 2024-10-16 RX ORDER — MAGNESIUM SULFATE HEPTAHYDRATE 40 MG/ML
2 INJECTION, SOLUTION INTRAVENOUS ONCE
Status: COMPLETED | OUTPATIENT
Start: 2024-10-16 | End: 2024-10-16

## 2024-10-16 RX ADMIN — CEFTRIAXONE SODIUM 1000 MG: 10 INJECTION, POWDER, FOR SOLUTION INTRAVENOUS at 20:03

## 2024-10-16 RX ADMIN — ONDANSETRON 4 MG: 2 INJECTION INTRAMUSCULAR; INTRAVENOUS at 11:57

## 2024-10-16 RX ADMIN — ALUMINUM HYDROXIDE, MAGNESIUM HYDROXIDE, DIMETHICONE 30 ML: 400; 400; 40 SUSPENSION ORAL at 18:23

## 2024-10-16 RX ADMIN — VITAM B12 100 MCG: 100 TAB at 10:07

## 2024-10-16 RX ADMIN — ENOXAPARIN SODIUM 40 MG: 40 INJECTION SUBCUTANEOUS at 10:06

## 2024-10-16 RX ADMIN — CARBAMAZEPINE 200 MG: 200 TABLET ORAL at 17:31

## 2024-10-16 RX ADMIN — MAGNESIUM SULFATE HEPTAHYDRATE 2 G: 40 INJECTION, SOLUTION INTRAVENOUS at 12:01

## 2024-10-16 RX ADMIN — Medication 2000 UNITS: at 10:07

## 2024-10-16 RX ADMIN — ASPIRIN 81 MG: 81 TABLET, COATED ORAL at 10:07

## 2024-10-16 RX ADMIN — CARBAMAZEPINE 200 MG: 200 TABLET ORAL at 10:07

## 2024-10-16 NOTE — ASSESSMENT & PLAN NOTE
Patient presented predominantly with right flank pain and nausea and vomiting.  CT imaging confirms ascending urinary tract changes.  Course complicated by the above.  Suspect that this is related to issues below and overall poor emptying.  Prior urine cultures reviewed.  Continue antibiotic as above  Follow-up pending culture data  Trend fever curve/WBC  Monitor for resolution of symptoms  Monitor for urinary retention  Follow-up with urology as outpatient  No plans for suppressive antibiotic as above  Anticipate 10 days of antibiotic for this issue  Hopeful transition to oral antibiotic closer to discharge

## 2024-10-16 NOTE — ASSESSMENT & PLAN NOTE
Patient's urology notes reviewed and at baseline she has a neurogenic bladder and what sounds like chronic urinary leakage and poor emptying.  She is consistent with estrogen use but is not consistent with fluid intake or timed voiding.  I suspect that these issues are leading to her recurrent infections.  Last cystoscopy was in 2023.  Most recent CT from 10/9 with left renal changes but also irregular thickening of the bladder.  No plans for suppressive antibiotic given these mechanical issues  Continue current antibiotic course as above  Follow-up with urology as outpatient  Anticipate patient will need updated cystoscopy  May need to consider straight catheterization as outpatient  Will monitor for retention while admitted  Encourage fluid hydration  Consider regular bowel regimen  Additional care per primary

## 2024-10-16 NOTE — H&P
H&P - Hospitalist   Name: Francesca Retana 78 y.o. female I MRN: [id number]  Unit/Bed#: FT 03 I Date of Admission: 10/15/2024   Date of Service: 10/15/2024 I Hospital Day: 0     Assessment & Plan  Pyelonephritis of right kidney  Will continue with IV Rocephin 1g q24h.  F/u blood and urine cultures.  Nausea is improved but will order PRN Zofran should it recur.  Left renal mass  CT with contrast on 10/9 was consistent with left renal mass concerning for RCC. Noncontrast CT done today did not show evidence of this. Unclear if this is related to lack of contrast on study?  Confirm with radiology and will obtain urology evaluation.  Multiple sclerosis (HCC)  Not in acute flare.  Does not appear to be on any maintenance meds.  Trigeminal neuralgia  Resume carbamazepine.  History of lung cancer  Stable respiratory status on room air      VTE Pharmacologic Prophylaxis:   Moderate Risk (Score 3-4) - Pharmacological DVT Prophylaxis Ordered: enoxaparin (Lovenox).  Code Status: Full code  Discussion with family: Updated  (daughter) at bedside.    Anticipated Length of Stay: Patient will be admitted on an inpatient basis with an anticipated length of stay of greater than 2 midnights secondary to acute right pyelonephritis.    History of Present Illness   Chief Complaint: Right flank pain and dysuria    Patient is an 77yo female with PMH of MS, neurogenic bladder, trigeminal neuralgia, and lung cancer s/p right total lung resection, who presented to the ED complaining of right flank pain and dysuria. Patient reports she initially began developing symptoms of urinary frequency several days ago. Then yesterday around noon she developed right flank pain associated with dysuria, cloudy urine, and N/V. She denies chest pain, SOB, abdominal pain, fever, or chills. Of note patient had a recent CT as an outpatient for evaluation of weight loss and was found to have a left renal mass concerning for RCC. She is scheduled to  follow up urology for further management on 10/21. In ED patient was found to have UA consistent with UTI and CT was obtained, which demonstrated right perinephric stranding and no mention of left renal mass. Patient was started on IV abx and is being admitted for further management.    Review of Systems   Constitutional:  Negative for chills and fever.   HENT:  Negative for rhinorrhea and sore throat.    Eyes:  Negative for photophobia and visual disturbance.   Respiratory:  Negative for cough and shortness of breath.    Cardiovascular:  Negative for chest pain and palpitations.   Gastrointestinal:  Negative for abdominal pain, nausea and vomiting.   Endocrine: Negative for cold intolerance and heat intolerance.   Genitourinary:  Positive for dysuria, flank pain and frequency.   Musculoskeletal:  Negative for back pain.   Neurological:  Negative for dizziness.   Psychiatric/Behavioral:  Negative for agitation.        Historical Information   Past Medical History:   Diagnosis Date    Cancer (HCC)     only has the left lung     MS (multiple sclerosis) (HCC)     Neurogenic bladder     Trigeminal neuralgia      Past Surgical History:   Procedure Laterality Date    LUNG REMOVAL, TOTAL      right      Social History     Tobacco Use    Smoking status: Former     Passive exposure: Past (as a teenager growing up)    Smokeless tobacco: Never   Vaping Use    Vaping status: Never Used   Substance and Sexual Activity    Alcohol use: Yes     Comment: Occ.    Drug use: Never    Sexual activity: Not Currently     E-Cigarette/Vaping    E-Cigarette Use Never User      E-Cigarette/Vaping Substances    Nicotine No     THC No     CBD No TINCTURE    Flavoring No     Other No     Unknown No        Social History:  Marital Status:    Occupation: Retired  Patient Pre-hospital Living Situation: Home  Patient Pre-hospital Level of Mobility: walks    Meds/Allergies   I have reviewed home medications with patient personally.  Prior to  Admission medications    Medication Sig Start Date End Date Taking? Authorizing Provider   albuterol (PROVENTIL HFA,VENTOLIN HFA) 90 mcg/act inhaler Inhale 2 puffs every 6 (six) hours as needed 3/11/22   Historical Provider, MD   aspirin (ECOTRIN LOW STRENGTH) 81 mg EC tablet Take 81 mg by mouth daily    Historical Provider, MD   betamethasone, augmented, (DIPROLENE) 0.05 % ointment as needed    Historical Provider, MD   carBAMazepine (TEGretol) 200 mg tablet Take 1 tablet by mouth 2 (two) times a day 3/11/22   Historical Provider, MD   Cholecalciferol 50 MCG (2000 UT) CAPS Take 2 capsules by mouth daily    Historical Provider, MD   cyanocobalamin (VITAMIN B-12) 100 MCG tablet Take 100 mcg by mouth daily    Historical Provider, MD   estradiol (ESTRACE VAGINAL) 0.1 mg/g vaginal cream Apply pea sized amount around urethra and inside vaginal opening 3 times per week 4/18/24   Carlee Birch PA-C   magnesium Oxide (MagOx 400) 400 mg TABS Take 400 mg by mouth 4/9/24 4/9/25  Historical Provider, MD   methenamine hippurate (HIPREX) 1 g tablet Take 1 tablet (1 g total) by mouth 2 (two) times a day with meals 4/18/24 10/15/24  Carlee Birch PA-C   methylcellulose (Citrucel) 500 mg tablet Take 2 each by mouth 4/9/24   Historical Provider, MD     No Known Allergies    Objective :  Temp:  [98.3 °F (36.8 °C)] 98.3 °F (36.8 °C)  HR:  [111-113] 113  BP: (138-165)/(65-83) 165/76  Resp:  [17-20] 17  SpO2:  [93 %-94 %] 93 %  O2 Device: None (Room air)    Physical Exam  Constitutional:       Appearance: Normal appearance.   HENT:      Head: Normocephalic and atraumatic.      Nose: Nose normal.      Mouth/Throat:      Pharynx: Oropharynx is clear.   Eyes:      Extraocular Movements: Extraocular movements intact.      Pupils: Pupils are equal, round, and reactive to light.   Cardiovascular:      Rate and Rhythm: Normal rate and regular rhythm.      Pulses: Normal pulses.      Heart sounds: Normal heart sounds.   Pulmonary:      Effort:  "Pulmonary effort is normal.      Comments: Absent breath sounds on the R.  Clear breath sounds on the L.  Abdominal:      General: Abdomen is flat. Bowel sounds are normal.      Palpations: Abdomen is soft.      Tenderness: There is no abdominal tenderness. There is right CVA tenderness.   Musculoskeletal:         General: No deformity.   Neurological:      General: No focal deficit present.      Mental Status: She is alert and oriented to person, place, and time.   Psychiatric:         Mood and Affect: Mood normal.         Behavior: Behavior normal.         Lines/Drains:            Lab Results: I have reviewed the following results:  Results from last 7 days   Lab Units 10/15/24  1814   WBC Thousand/uL 7.54   HEMOGLOBIN g/dL 11.7   HEMATOCRIT % 37.0   PLATELETS Thousands/uL 201   BANDS PCT % 1   LYMPHO PCT % 5*   MONO PCT % 2*   EOS PCT % 0     Results from last 7 days   Lab Units 10/15/24  1814   SODIUM mmol/L 140   POTASSIUM mmol/L 3.5   CHLORIDE mmol/L 104   CO2 mmol/L 25   BUN mg/dL 22   CREATININE mg/dL 1.11   ANION GAP mmol/L 11   CALCIUM mg/dL 8.8   GLUCOSE RANDOM mg/dL 150*             No results found for: \"HGBA1C\"  Results from last 7 days   Lab Units 10/15/24  1943   LACTIC ACID mmol/L 2.3*       "

## 2024-10-16 NOTE — PROGRESS NOTES
Progress Note - Hospitalist   Name: Francesca Retana 78 y.o. female I MRN: [id number]  Unit/Bed#: -01 I Date of Admission: 10/15/2024   Date of Service: 10/16/2024 I Hospital Day: 1    Assessment & Plan  Sepsis (HCC)  Patient originally presented to the ED with complaints of right flank pain with dysuria  Secondary to right pyelonephritis, seen on CT imaging.  Sepsis evolving since admission, initially had tachycardia but no other criteria, this morning patient is still tachycardic with leukocytosis.  Lactic acid level 2.3 on admission, resolved with IVF 1.1  UA positive with Nitrites/Leukocytes, with innumerable WBC/Bacteria  Urine culture pending  Blood cultures x 2 obtained, pending  Pyelonephritis of right kidney  Present on admission  CT Renal Stone (10/15/24): 1.  Mild right hydroureteronephrosis, with diffuse right perinephric and periureteral stranding. There is also diffuse urinary bladder wall thickening and surrounding stranding. This suggests a cystitis and findings concerning for right pyelonephritis. 2.  Colonic diverticulosis. 3.  Punctate nonobstructing left renal calculus.  Patient does have history of recurrent UTIs, follows with Urology  Takes Hiprex/Estrace at home typically.  Continue with IV Ceftriaxone for now  Left renal mass  Known left renal cyst  Follows with Urology  Patient gets serial monitoring US yearly.  Multiple sclerosis (HCC)  Chronic history  Follows with [facility] - Neurology  Trigeminal neuralgia  Follows with [facility] - Neurology  Continue Carbamazepine  History of lung cancer  Follows with [facility]    VTE Pharmacologic Prophylaxis:    Enoxaparin    Mobility:      AM-PAC not yet calculated    Patient Centered Rounds: I performed bedside rounds with nursing staff today.   Discussions with Specialists or Other Care Team Provider: Case Management    Education and Discussions with Family / Patient: Updated  (daughter) at bedside.    Current Length of Stay: 1  day(s)  Current Patient Status: Inpatient   Certification Statement: The patient will continue to require additional inpatient hospital stay due to ongoing treatment in setting of awaiting cultures to come back  Discharge Plan: Anticipate discharge in 24-48 hrs to home with home services.    Code Status: Level 1 - Full Code    Subjective   Patient resting in bed, reports she is feeling better than when she came in but does not have much of an appetite.  No complaints of chest pain, shortness of breath, fever or chills.     Objective :  Temp:  [98.1 °F (36.7 °C)-98.3 °F (36.8 °C)] 98.1 °F (36.7 °C)  HR:  [] 103  BP: (101-165)/(53-88) 114/60  Resp:  [17-20] 19  SpO2:  [93 %-99 %] 95 %  O2 Device: None (Room air)    There is no height or weight on file to calculate BMI.     Input and Output Summary (last 24 hours):     Intake/Output Summary (Last 24 hours) at 10/16/2024 0926  Last data filed at 10/16/2024 0853  Gross per 24 hour   Intake 230 ml   Output --   Net 230 ml       Physical Exam  Vitals and nursing note reviewed.   Constitutional:       General: She is not in acute distress.     Appearance: She is normal weight. She is ill-appearing.   Cardiovascular:      Rate and Rhythm: Normal rate.      Pulses: Normal pulses.      Heart sounds: Normal heart sounds.   Pulmonary:      Effort: Pulmonary effort is normal.      Breath sounds: Normal breath sounds.   Abdominal:      General: Bowel sounds are normal. There is no distension.      Palpations: Abdomen is soft.      Tenderness: There is no abdominal tenderness. There is right CVA tenderness.   Musculoskeletal:         General: Normal range of motion.      Right lower leg: No edema.      Left lower leg: No edema.   Skin:     General: Skin is warm and dry.      Capillary Refill: Capillary refill takes less than 2 seconds.   Neurological:      Mental Status: She is alert and oriented to person, place, and time.   Psychiatric:         Mood and Affect: Mood normal.            Lines/Drains:              Lab Results: I have reviewed the following results:   Results from last 7 days   Lab Units 10/16/24  0447 10/15/24  1814   WBC Thousand/uL 21.20* 7.54   HEMOGLOBIN g/dL 10.1* 11.7   HEMATOCRIT % 31.9* 37.0   PLATELETS Thousands/uL 156 201   BANDS PCT %  --  1   LYMPHO PCT %  --  5*   MONO PCT %  --  2*   EOS PCT %  --  0     Results from last 7 days   Lab Units 10/16/24  0447   SODIUM mmol/L 139   POTASSIUM mmol/L 3.6   CHLORIDE mmol/L 105   CO2 mmol/L 26   BUN mg/dL 21   CREATININE mg/dL 0.99   ANION GAP mmol/L 8   CALCIUM mg/dL 8.0*   GLUCOSE RANDOM mg/dL 139                 Results from last 7 days   Lab Units 10/16/24  0002 10/15/24  1943   LACTIC ACID mmol/L 1.1 2.3*       Recent Cultures (last 7 days):   Results from last 7 days   Lab Units 10/15/24  1945 10/15/24  1943   BLOOD CULTURE  Received in Microbiology Lab. Culture in Progress. Received in Microbiology Lab. Culture in Progress.       Imaging Results Review: No pertinent imaging studies reviewed.  Other Study Results Review: No additional pertinent studies reviewed.    Last 24 Hours Medication List:     Current Facility-Administered Medications:     acetaminophen (TYLENOL) tablet 650 mg, Q6H PRN    aspirin (ECOTRIN LOW STRENGTH) EC tablet 81 mg, Daily    carBAMazepine (TEGretol) tablet 200 mg, BID    ceftriaxone (ROCEPHIN) 1 g/50 mL in dextrose IVPB, Q24H    Cholecalciferol (VITAMIN D3) tablet 2,000 Units, Daily    cyanocobalamin (VITAMIN B-12) tablet 100 mcg, Daily    enoxaparin (LOVENOX) subcutaneous injection 40 mg, Daily    ondansetron (ZOFRAN) injection 4 mg, Q8H PRN    Administrative Statements   Today, Patient Was Seen By: MARYSE Sapnn  I have spent a total time of 36 minutes in caring for this patient on the day of the visit/encounter including Diagnostic results, Risks and benefits of tx options, Instructions for management, Patient and family education, Importance of tx compliance, Risk factor  reductions, Counseling / Coordination of care, Documenting in the medical record, Reviewing / ordering tests, medicine, procedures  , Obtaining or reviewing history  , and Communicating with other healthcare professionals .    **Please Note: This note may have been constructed using a voice recognition system.**

## 2024-10-16 NOTE — ASSESSMENT & PLAN NOTE
1 out of 2 blood cultures from admission have isolated gram-negative rods.  PCR pending.  Prior culture data reviewed.  Sources #3.  No other obvious sources on exam or imaging.  Patient reporting symptomatic improvement.  Continue ceftriaxone for now  Follow-up pending blood culture/PCR testing  Repeat CBC/chemistry tomorrow  Follow-up pending urine cultures  Monitor for resolution of symptoms  Monitor for urinary retention.  Urology evaluation noted  Additional supportive cares per primary  Will adjust antibiotics further based on culture data  Anticipate 10 days of antibiotic for #3  Hopeful transition to oral therapy closer to discharge

## 2024-10-16 NOTE — CONSULTS
Consultation - Urology   Name: Francesca Retana 78 y.o. female I MRN: [id number]  Unit/Bed#: -01 I Date of Admission: 10/15/2024   Date of Service: 10/16/2024 I Hospital Day: 1   Inpatient consult to Urology  Consult performed by: Dora Mckeon PA-C  Consult ordered by: Aurelia Faustin MD        Physician Requesting Evaluation: Felice Moore MD   Reason for Evaluation / Principal Problem: Sepsis, pyelonephritis,     Assessment & Plan  Pyelonephritis of right kidney  Presented with dysuria, right flank pain  CT with new mild fullness of right renal collecting system and ureter, moderate perinephric and periureteral stranding.  No kidney stone or obstruction.  Urinary bladder with small left posterior bladder wall diet reticulum, stranding.  -CVA tenderness, improved from yesterday  -Dysuria has resolved this a.m.  -Afebrile  -WBC 21  -Patient known to our practice history of recurrent UTIs.  On Hiprex, Estrace.  She has undergone cystoscopy within the past year negative for any bladder abnormalities    Plan:  -Continue medical management per primary  -Empiric antibiotics, follow-up on cultures  -Symptom management with heating pad, Pyridium  -Obtain PVR. Hold antispasmotics, home mybetriq  -No acute  surgical intervention  -Patient has follow-up on 10/21 outpatient  -Urology will sign off at this time.  Please not hesitate to reach out any questions or concerns  Sepsis (HCC)  Leukocytosis, UA positive-pyelo, tachycardia  Urine and blood cultures pending  Left renal mass  -CTA from [facility] noting 1.7 cm enhancing mass in left posterior midpole concerning for renal cell carcinoma  -According to previous images this has been noted dating back to 2016-renal cysts  -Patient has had a renal protocol in 2023 which found a Bosniak 2 renal cyst  -Most recent ultrasound in 2024 showing 2.5 x 2.2 cm septated upper pole cyst  -Our office is requesting images from [facility] for comparison.  Due to stabilization for years,  reassuring sign.  -Patient has follow-up on 10/21 outpatient  Multiple sclerosis (HCC)  Neurogenic bladder        Subjective:   HPI: Francesca is an 78-year-old female known to our practice history of recurrent UTIs, left renal cyst, neurogenic bladder.  Patient reports she usually has a UTI approximately once a month.  She has undergone a cystoscopy which was normal last year.  She has tried topical estrogen clean, hydration, Hiprex in the past.  Patient reports acute onset of right-sided flank pain yesterday.  She reports severe dysuria.  Due to the symptoms she presented to the ED for evaluation.  In the ED she had a CT scan which showed evidence of right pyelonephritis.  Patient was admitted to medicine service for further management.  She had urine and blood cultures drawn.  Patient has an elevated white blood cell count of 21, remains afebrile.  Since yesterday patient reports great improvement in her symptoms.  She reports minimal right-sided flank pain and dysuria is completely resolved.  Urology was consulted due to indeterminant left renal lesion noted on CTA from [facility], and pyelonephritis    Review of Systems   Constitutional:  Negative for chills and fever.   Eyes:  Negative for pain and visual disturbance.   Respiratory:  Negative for cough and shortness of breath.    Cardiovascular:  Negative for chest pain and palpitations.   Gastrointestinal:  Negative for abdominal pain and vomiting.   Genitourinary:  Positive for dysuria and flank pain. Negative for hematuria.   Musculoskeletal:  Negative for arthralgias and back pain.   Skin:  Negative for color change and rash.   Neurological:  Negative for seizures and syncope.   All other systems reviewed and are negative.      Objective:    Vitals: Blood pressure 114/60, pulse 103, temperature 98.1 °F (36.7 °C), resp. rate 19, SpO2 95%.,There is no height or weight on file to calculate BMI.    Physical Exam  Vitals reviewed.   Constitutional:       General: She  is not in acute distress.     Appearance: Normal appearance. She is normal weight. She is not ill-appearing, toxic-appearing or diaphoretic.   HENT:      Head: Normocephalic and atraumatic.      Right Ear: External ear normal.      Left Ear: External ear normal.      Nose: Nose normal.      Mouth/Throat:      Mouth: Mucous membranes are moist.   Eyes:      General: No scleral icterus.     Conjunctiva/sclera: Conjunctivae normal.   Cardiovascular:      Rate and Rhythm: Normal rate.      Pulses: Normal pulses.   Pulmonary:      Effort: Pulmonary effort is normal.   Abdominal:      General: Abdomen is flat. There is no distension.      Palpations: Abdomen is soft.      Tenderness: There is no abdominal tenderness. There is right CVA tenderness. There is no left CVA tenderness or guarding.   Musculoskeletal:         General: Normal range of motion.      Cervical back: Normal range of motion.   Skin:     General: Skin is warm.      Findings: No rash.   Neurological:      General: No focal deficit present.      Mental Status: She is alert and oriented to person, place, and time. Mental status is at baseline.   Psychiatric:         Mood and Affect: Mood normal.         Behavior: Behavior normal.         Thought Content: Thought content normal.         Judgment: Judgment normal.         Imaging:  CT ABDOMEN AND PELVIS WITHOUT IV CONTRAST - LOW DOSE RENAL STONE     INDICATION: Right flank pain. Nausea and dysuria.     COMPARISON: CT renal stone 2/8/2023     TECHNIQUE: Low radiation dose thin section CT examination of the abdomen and pelvis was performed without intravenous or oral contrast according to a protocol specifically designed to evaluate for urinary tract calculus. Axial, sagittal, and coronal 2D   reformatted images were created from the source data and submitted for interpretation. Evaluation for pathology in the abdomen and pelvis that is unrelated to urinary tract calculi is limited.     Radiation dose length  product (DLP) for this visit: 269 mGy-cm . This examination, like all CT scans performed in the [facility], was performed utilizing techniques to minimize radiation dose exposure, including the use of iterative   reconstruction and automated exposure control.     URINARY TRACT FINDINGS:  Evaluation of the parenchymal organs, mucosa and urothelium is significantly limited without intravenous contrast.     RIGHT KIDNEY AND URETER: New mild fullness of the right renal collecting system and ureter, with moderate perinephric and periureteral stranding. There is no convincing associated renal or ureteral calculus.     LEFT KIDNEY AND URETER: Punctate nonobstructing left renal lower pole calculus. Upper pole simple fluid density cyst. The left ureter is unremarkable.     URINARY BLADDER: Likely mild diffuse bladder wall thickening and faint surrounding. Small left posterior lateral bladder diverticulum.        ADDITIONAL FINDINGS:     LOWER CHEST: Chronic volume loss within the right hemithorax, with rightward shift of the Dunnum. This is incompletely assessed, but grossly unchanged.     SOLID VISCERA: Limited low radiation dose noncontrast CT evaluation demonstrates no clinically significant abnormality of the imaged portions of the liver, spleen, pancreas, or adrenal glands.     GALLBLADDER/BILIARY TREE: No calcified gallstones. No pericholecystic inflammatory change. No biliary dilation.     STOMACH AND BOWEL: The stomach and small bowel are unremarkable. There are numerous colonic diverticula, without evidence of acute inflammation.     APPENDIX: No findings to suggest appendicitis.     ABDOMINOPELVIC CAVITY: No ascites. No pneumoperitoneum. No lymphadenopathy.     VESSELS: Moderate diffuse atherosclerotic changes/calcifications throughout the aorta and central branch vessels. The IVC is grossly unremarkable.     REPRODUCTIVE ORGANS: Unremarkable for patient's age.     ABDOMINAL WALL/INGUINAL REGIONS:  Unremarkable.     BONES: Advanced multilevel lumbar spondylosis and grade 1 degenerative anterolisthesis of L4 on L5.     IMPRESSION:     1.  Mild right hydroureteronephrosis, with diffuse right perinephric and periureteral stranding. There is also diffuse urinary bladder wall thickening and surrounding stranding. This suggests a cystitis and findings concerning for right pyelonephritis.  2.  Colonic diverticulosis.  3.  Punctate nonobstructing left renal calculus.     Workstation performed: [id number]    Labs:  Recent Labs     10/15/24  1814 10/16/24  0447   WBC 7.54 21.20*       Recent Labs     10/15/24  1814 10/16/24  0447   HGB 11.7 10.1*     Recent Labs     10/15/24  1814 10/16/24  0447   HCT 37.0 31.9*     Recent Labs     10/15/24  1814 10/16/24  0447   CREATININE 1.11 0.99         History:    Past Medical History:   Diagnosis Date    Cancer (HCC)     only has the left lung     MS (multiple sclerosis) (HCC)     Neurogenic bladder     Trigeminal neuralgia      Social History     Socioeconomic History    Marital status:      Spouse name: None    Number of children: None    Years of education: None    Highest education level: None   Occupational History    None   Tobacco Use    Smoking status: Former     Passive exposure: Past (as a teenager growing up)    Smokeless tobacco: Never   Vaping Use    Vaping status: Never Used   Substance and Sexual Activity    Alcohol use: Yes     Comment: Occ.    Drug use: Never    Sexual activity: Not Currently   Other Topics Concern    None   Social History Narrative    None     Social Determinants of Health     Financial Resource Strain: Low Risk  (3/12/2024)    [facility]    Overall Financial Resource Strain (CARDIA)     Difficulty of Paying Living Expenses: Not very hard   Food Insecurity: No Food Insecurity (10/16/2024)    Hunger Vital Sign     Worried About Running Out of Food in the Last Year: Never true      Ran Out of Food in the Last Year: Never true   Transportation Needs: No Transportation Needs (10/16/2024)    PRAPARE - Transportation     Lack of Transportation (Medical): No     Lack of Transportation (Non-Medical): No   Physical Activity: Not on file   Stress: Patient Declined (3/12/2024)    Received from [facility], [facility] - Occupational Stress Questionnaire     Feeling of Stress : Patient declined   Social Connections: Feeling Socially Integrated (3/12/2024)    Received from [facility], [facility]    OASIS : Social Isolation     How often do you feel lonely or isolated from those around you?: Never   Intimate Partner Violence: Not At Risk (3/12/2024)    Received from [facility], [facility]    Humiliation, Afraid, Rape, and Kick questionnaire     Fear of Current or Ex-Partner: No     Emotionally Abused: No     Physically Abused: No     Sexually Abused: No   Housing Stability: Low Risk  (10/16/2024)    Housing Stability Vital Sign     Unable to Pay for Housing in the Last Year: No     Number of Times Moved in the Last Year: 1     Homeless in the Last Year: No     Past Surgical History:   Procedure Laterality Date    LUNG REMOVAL, TOTAL      right      Family History   Problem Relation Age of Onset    Cirrhosis Father     No Known Problems Mother     No Known Problems Daughter     No Known Problems Daughter        Dora Mckeon PA-C  Date: 10/16/2024 Time: 10:08 AM

## 2024-10-16 NOTE — ASSESSMENT & PLAN NOTE
-CTA from [facility] noting 1.7 cm enhancing mass in left posterior midpole concerning for renal cell carcinoma  -According to previous images this has been noted dating back to 2016-renal cysts  -Patient has had a renal protocol in 2023 which found a Bosniak 2 renal cyst  -Most recent ultrasound in 2024 showing 2.5 x 2.2 cm septated upper pole cyst  -Our office is requesting images from [facility] for comparison.  Due to stabilization for years, reassuring sign.  -Patient has follow-up on 10/21 outpatient

## 2024-10-16 NOTE — ASSESSMENT & PLAN NOTE
Patient originally presented to the ED with complaints of right flank pain with dysuria  Secondary to right pyelonephritis, seen on CT imaging.  Sepsis evolving since admission, initially had tachycardia but no other criteria, this morning patient is still tachycardic with leukocytosis.  Lactic acid level 2.3 on admission, resolved with IVF 1.1  UA positive with Nitrites/Leukocytes, with innumerable WBC/Bacteria  Urine culture pending  Blood cultures x 2 obtained, pending

## 2024-10-16 NOTE — UTILIZATION REVIEW
Initial Clinical Review    Admission: Date/Time/Statement:   Admission Orders (From admission, onward)       Ordered        10/15/24 2032  INPATIENT ADMISSION  Once                          Orders Placed This Encounter   Procedures    INPATIENT ADMISSION     Standing Status:   Standing     Number of Occurrences:   1     Order Specific Question:   Level of Care     Answer:   Med Surg [16]     Order Specific Question:   Estimated length of stay     Answer:   More than 2 Midnights     Order Specific Question:   Certification     Answer:   I certify that inpatient services are medically necessary for this patient for a duration of greater than two midnights. See H&P and MD Progress Notes for additional information about the patient's course of treatment.     ED Arrival Information       Expected   -    Arrival   10/15/2024 17:52    Acuity   Urgent              Means of arrival   Ambulance    Escorted by   [facility]   Hospitalist    Admission type   Emergency              Arrival complaint   FLANK PAIN             Chief Complaint   Patient presents with    Flank Pain     Patient states at 12 o'clock she stated to get nauseas and states she became incontinent and started to burn when urinating 2 days ago. Patient can't keep any food down and keeps vomiting and has lower flank pain.       Initial Presentation: 78 y.o. female to ED from home via EMS PMH of MS, neurogenic bladder, trigeminal neuralgia, and lung cancer s/p right total lung resection, who presented to the ED complaining of right flank pain and dysuria. Patient reports she initially began developing symptoms of urinary frequency several days ago. Then yesterday around noon she developed right flank pain associated with dysuria, cloudy urine, and N/V. had a recent CT as an outpatient for evaluation of weight loss and was found to have a left renal mass concerning for RCC . Ua + UTI . CT was obtained, which demonstrated right perinephric  stranding and no mention of left renal mass. Started on IV abx . Admitted IP status w/ pyelonephritis . Plan for IV rocephin , f/u BC and ua cx , prn zofran . L renal mass confirm w/ radiology and obtain urology eval .     Anticipated Length of Stay/Certification Statement: Patient will be admitted on an inpatient basis with an anticipated length of stay of greater than 2 midnights secondary to acute right pyelonephritis.     Date: 10/16   Day 2: ua and BC pending . Cont IV ceftriaxone . Anticipate DC in 24-48 hrs to home w/ home services. C/o R CVA tenderness . Wbc 21.20 dec to 12.46.    10/16 ID Consult   Gram neg bacteremia , f/u BC . Cont ceftriaxone . F/u ua cx . Repeat CBC in am .     10/16 Urology Consult   dysuria, right flank pain  CT with new mild fullness of right renal collecting system and ureter, moderate perinephric and periureteral stranding.  No kidney stone or obstruction.  Urinary bladder with small left posterior bladder wall diet reticulum, stranding. Plan Continue medical management per primary. Empiric antibiotics, follow-up on cultures. Symptom management with heating pad, Pyridium. Obtain PVR. Hold antispasmotics, home mybetriq. No acute  surgical intervention. Ua and BC pending     Date: 10/17  Day 3: Has surpassed a 2nd midnight with active treatments and services.  Cont IV ceftriaxone , anticipate 10 day treatment . Urine culture + klebsiella pneumoniae, proteus.  Blood cultures + gram - rods, BCID proteus.  Wbc inc to 12.46 from 7.54 on 10/15.   Anticipate discharge in 24-48 hrs to home with home services.         ED Treatment-Medication Administration from 10/15/2024 1752 to 10/16/2024 0603         Date/Time Order Dose Route Action     10/15/2024 1814 ondansetron (FOR EMS ONLY) (ZOFRAN) 4 mg/2 mL injection 4 mg 0 mg Does not apply Given to EMS     10/15/2024 1814 sodium chloride 0.9 % bolus 1,000 mL 1,000 mL Intravenous New Bag     10/15/2024 1948 ceftriaxone (ROCEPHIN) 1 g/50 mL in  dextrose IVPB 1,000 mg Intravenous New Bag     10/15/2024 1956 morphine injection 4 mg 4 mg Intravenous Given            Scheduled Medications:  aspirin, 81 mg, Oral, Daily  carBAMazepine, 200 mg, Oral, BID  cefTRIAXone, 1,000 mg, Intravenous, Q24H  Cholecalciferol, 2,000 Units, Oral, Daily  cyanocobalamin, 100 mcg, Oral, Daily  enoxaparin, 40 mg, Subcutaneous, Daily      Continuous IV Infusions:     PRN Meds:  acetaminophen, 650 mg, Oral, Q6H PRN  ondansetron, 4 mg, Intravenous, Q8H PRN      ED Triage Vitals [10/15/24 5297]   Temperature Pulse Respirations Blood Pressure SpO2 Pain Score   98.3 °F (36.8 °C) (!) 112 20 141/83 94 % 8     Weight (last 2 days)       Date/Time Weight    10/17/24 0900 56.6 (124.78)    10/17/24 07:53:18 58.6 (129.19)    10/16/24 07:33:50 61.2 (135)            Vital Signs (last 3 days)       Date/Time Temp Pulse Resp BP MAP (mmHg) SpO2 O2 Device Patient Position - Orthostatic VS Pain    10/17/24 14:36:43 97.9 °F (36.6 °C) -- -- 128/71 90 -- -- -- --    10/17/24 07:53:18 97.9 °F (36.6 °C) 100 -- 129/74 92 96 % -- -- --    10/17/24 0730 -- -- -- -- -- 92 % None (Room air) -- No Pain    10/16/24 21:48:22 98.5 °F (36.9 °C) 97 -- 120/65 83 97 % -- -- --    10/16/24 2029 -- 102 -- -- -- 88 % -- -- --    10/16/24 2000 -- -- -- -- -- -- None (Room air) -- No Pain    10/16/24 15:30:22 98.7 °F (37.1 °C) 99 -- 121/63 82 96 % -- -- --    10/16/24 07:33:50 98.1 °F (36.7 °C) 103 16 114/60 78 95 % -- -- --    10/16/24 0731 -- -- -- -- -- -- -- -- No Pain    10/16/24 0730 98.1 °F (36.7 °C) -- -- -- -- -- -- -- No Pain    10/16/24 0630 -- 100 19 101/53 73 98 % None (Room air) Lying 4    10/16/24 0230 -- 99 20 158/88 -- 99 % -- -- --    10/15/24 1959 -- 113 17 165/76 -- 93 % None (Room air) Lying 8    10/15/24 1956 -- -- -- -- -- -- -- -- 10 - Worst Possible Pain    10/15/24 1900 -- 111 20 138/65 93 -- -- -- --    10/15/24 1757 98.3 °F (36.8 °C) 112 20 141/83 -- 94 % None (Room air) Lying 8               Pertinent Labs/Diagnostic Test Results:   Radiology:  CT renal stone study abdomen pelvis wo contrast   Final Interpretation by Owen Dumont MD (10/15 2013)      1.  Mild right hydroureteronephrosis, with diffuse right perinephric and periureteral stranding. There is also diffuse urinary bladder wall thickening and surrounding stranding. This suggests a cystitis and findings concerning for right pyelonephritis.   2.  Colonic diverticulosis.   3.  Punctate nonobstructing left renal calculus.      Workstation performed: [id number]           Cardiology:  ECG 12 lead   Final Result by Tracey Walters MD (10/16 1514)   Sinus tachycardia   Nonspecific ST abnormality   Confirmed by Tracey Walters ([id number]) on 10/16/2024 3:14:45 PM        GI:  No orders to display           Results from last 7 days   Lab Units 10/17/24  0444 10/16/24  0447 10/15/24  1814   WBC Thousand/uL 12.46* 21.20* 7.54   HEMOGLOBIN g/dL 10.7* 10.1* 11.7   HEMATOCRIT % 33.8* 31.9* 37.0   PLATELETS Thousands/uL 146* 156 201   BANDS PCT %  --   --  1         Results from last 7 days   Lab Units 10/17/24  0444 10/16/24  0447 10/15/24  1814   SODIUM mmol/L 137 139 140   POTASSIUM mmol/L 3.2* 3.6 3.5   CHLORIDE mmol/L 103 105 104   CO2 mmol/L 26 26 25   ANION GAP mmol/L 8 8 11   BUN mg/dL 20 21 22   CREATININE mg/dL 0.73 0.99 1.11   EGFR ml/min/1.73sq m 78 53 47   CALCIUM mg/dL 8.2* 8.0* 8.8   MAGNESIUM mg/dL 1.9 1.4*  --    PHOSPHORUS mg/dL  --  3.5  --        Results from last 7 days   Lab Units 10/17/24  0444 10/16/24  0447 10/15/24  1814   GLUCOSE RANDOM mg/dL 86 139 150*             Results from last 7 days   Lab Units 10/16/24  0002 10/15/24  1943   LACTIC ACID mmol/L 1.1 2.3*     Results from last 7 days   Lab Units 10/15/24  1837   CLARITY UA  Turbid   COLOR UA  Dark Yellow   SPEC GRAV UA  1.009   PH UA  7.0   GLUCOSE UA mg/dl Negative   KETONES UA mg/dl Trace*   BLOOD UA  Moderate*   PROTEIN UA mg/dl Negative   NITRITE UA   Positive*   BILIRUBIN UA  Negative   UROBILINOGEN UA (BE) mg/dl <2.0   LEUKOCYTES UA  Large*   WBC UA /hpf Innumerable*   RBC UA /hpf 30-50*   BACTERIA UA /hpf Innumerable*   EPITHELIAL CELLS WET PREP /hpf Occasional       Results from last 7 days   Lab Units 10/15/24  1945 10/15/24  1943/24  1837   BLOOD CULTURE  Proteus species* Proteus species*  --    GRAM STAIN RESULT  Gram negative rods* Gram negative rods*  --    URINE CULTURE   --   --  >100,000 cfu/ml Klebsiella pneumoniae*  Proteus mirabilis*       Past Medical History:   Diagnosis Date    Cancer (HCC)     only has the left lung     MS (multiple sclerosis) (HCC)     Neurogenic bladder     Trigeminal neuralgia      Present on Admission:   Pyelonephritis of right kidney   Sepsis (HCC)   Neurogenic bladder      Admitting Diagnosis: Pyelonephritis [N12]  Flank pain [R10.9]  Left renal mass [N28.89]  Age/Sex: 78 y.o. female    Network Utilization Review Department  ATTENTION: Please call with any questions or concerns to [phone] and carefully listen to the prompts so that you are directed to the right person. All voicemails are confidential.   For Discharge needs, contact Care Management DC Support Team at [phone] opt. 2  Send all requests for admission clinical reviews, approved or denied determinations and any other requests to dedicated fax number below belonging to the campus where the patient is receiving treatment. List of dedicated fax numbers for the Facilities:  FACILITY NAME UR FAX NUMBER   ADMISSION DENIALS (Administrative/Medical Necessity) [phone]   DISCHARGE SUPPORT TEAM (NETWORK) [phone]   PARENT CHILD HEALTH (Maternity/NICU/Pediatrics) [phone]   [facility] [phone]   [facility] [phone]   [facility] [phone]   [facility] [phone]   [facility] [phone]   [facility]  [facility] [phone]   [facility] [phone]   [facility] [phone]   [facility] [phone]   [facility] [phone]   [facility] [phone]   [facility] [phone]

## 2024-10-16 NOTE — ASSESSMENT & PLAN NOTE
Presented with dysuria, right flank pain  CT with new mild fullness of right renal collecting system and ureter, moderate perinephric and periureteral stranding.  No kidney stone or obstruction.  Urinary bladder with small left posterior bladder wall diet reticulum, stranding.  -CVA tenderness, improved from yesterday  -Dysuria has resolved this a.m.  -Afebrile  -WBC 21  -Patient known to our practice history of recurrent UTIs.  On Hiprex, Estrace.  She has undergone cystoscopy within the past year negative for any bladder abnormalities    Plan:  -Continue medical management per primary  -Empiric antibiotics, follow-up on cultures  -Symptom management with heating pad, Pyridium  -Obtain PVR. Hold antispasmotics, home mybetriq  -No acute  surgical intervention  -Patient has follow-up on 10/21 outpatient  -Urology will sign off at this time.  Please not hesitate to reach out any questions or concerns

## 2024-10-16 NOTE — ASSESSMENT & PLAN NOTE
Will continue with IV Rocephin 1g q24h.  F/u blood and urine cultures.  Nausea is improved but will order PRN Zofran should it recur.

## 2024-10-16 NOTE — ASSESSMENT & PLAN NOTE
Recently diagnosed on imaging from 10/9.  Patient also with prior history of lung cancer requiring pneumonectomy.  Tentative plans are for follow-up with urology  Continue antibiotics as above  Continue plans for follow-up with urology  Anticipate patient will need updated cystoscopy  Additional care per primary

## 2024-10-16 NOTE — ASSESSMENT & PLAN NOTE
CT with contrast on 10/9 was consistent with left renal mass concerning for RCC. Noncontrast CT done today did not show evidence of this. Unclear if this is related to lack of contrast on study?  Confirm with radiology and will obtain urology evaluation.

## 2024-10-16 NOTE — ASSESSMENT & PLAN NOTE
Patient is not on any disease modifying agents and has not been since she was 73.  Course complicated by the above.  Supportive care otherwise per primary.     Home

## 2024-10-16 NOTE — CONSULTS
Consultation - Infectious Disease   Name: Francesca Retana 78 y.o. female I MRN: [id number]  Unit/Bed#: -01 I Date of Admission: 10/15/2024   Date of Service: 10/16/2024 I Hospital Day: 1   Inpatient consult to Infectious Diseases  Consult performed by: Kailey Martinez MD  Consult ordered by: MARYSE Spann        Physician Requesting Evaluation: Felice Moore MD   Reason for Evaluation / Principal Problem: Bacteremia      Assessment & Plan  Sepsis (HCC)  Tachycardia with leukocytosis.  This is due to #2 and #3.  Patient is otherwise hemodynamically stable.  Blood cultures and urine cultures are pending.  PCR pending.  Continue ceftriaxone 1 g every 24 hours based on prior cultures  Follow-up blood culture PCR testing  Repeat CBC/chemistry tomorrow to monitor treatment response/dosing  Follow-up pending urine cultures  Monitor for resolution of symptoms  Monitor for urinary retention, retention protocol ordered  Additional supportive cares per primary  Additional interventions pending clinical course  Gram-negative bacteremia  1 out of 2 blood cultures from admission have isolated gram-negative rods.  PCR pending.  Prior culture data reviewed.  Sources #3.  No other obvious sources on exam or imaging.  Patient reporting symptomatic improvement.  Continue ceftriaxone for now  Follow-up pending blood culture/PCR testing  Repeat CBC/chemistry tomorrow  Follow-up pending urine cultures  Monitor for resolution of symptoms  Monitor for urinary retention.  Urology evaluation noted  Additional supportive cares per primary  Will adjust antibiotics further based on culture data  Anticipate 10 days of antibiotic for #3  Hopeful transition to oral therapy closer to discharge  Pyelonephritis of right kidney  Patient presented predominantly with right flank pain and nausea and vomiting.  CT imaging confirms ascending urinary tract changes.  Course complicated by the above.  Suspect that this is related to issues  below and overall poor emptying.  Prior urine cultures reviewed.  Continue antibiotic as above  Follow-up pending culture data  Trend fever curve/WBC  Monitor for resolution of symptoms  Monitor for urinary retention  Follow-up with urology as outpatient  No plans for suppressive antibiotic as above  Anticipate 10 days of antibiotic for this issue  Hopeful transition to oral antibiotic closer to discharge  Recurrent urinary tract infection  Patient's urology notes reviewed and at baseline she has a neurogenic bladder and what sounds like chronic urinary leakage and poor emptying.  She is consistent with estrogen use but is not consistent with fluid intake or timed voiding.  I suspect that these issues are leading to her recurrent infections.  Last cystoscopy was in 2023.  Most recent CT from 10/9 with left renal changes but also irregular thickening of the bladder.  No plans for suppressive antibiotic given these mechanical issues  Continue current antibiotic course as above  Follow-up with urology as outpatient  Anticipate patient will need updated cystoscopy  May need to consider straight catheterization as outpatient  Will monitor for retention while admitted  Encourage fluid hydration  Consider regular bowel regimen  Additional care per primary  Neurogenic bladder  This is a complication of patient's MS.  Course complicated by the above.  Will monitor for retention while admitted.  Follow-up with urology as outpatient.  Multiple sclerosis (HCC)  Patient is not on any disease modifying agents and has not been since she was 73.  Course complicated by the above.  Supportive care otherwise per primary.    Left renal mass  Recently diagnosed on imaging from 10/9.  Patient also with prior history of lung cancer requiring pneumonectomy.  Tentative plans are for follow-up with urology  Continue antibiotics as above  Continue plans for follow-up with urology  Anticipate patient will need updated cystoscopy  Additional  care per primary    Above plan discussed in detail with the patient and her family at bedside  Above plan discussed in detail with primary service advance practitioner who is aware of plans for ongoing antibiotics and following up cultures to adjust therapy  Micro lab contacted for any updates on PCR from blood culture.    ID consult service will continue to follow.    I have spent a total time of 80 minutes on 10/16/24 in caring for this patient including Diagnostic results, Patient and family education, Risk factor reductions, Impressions, Documenting in the medical record, Reviewing / ordering tests, medicine, procedures  , Obtaining or reviewing history  , and Communicating with other healthcare professionals .     HISTORY OF PRESENT ILLNESS:  HPI: Francesca Retana is a 78 y.o. year old female with MS, reported neurogenic bladder, trigeminal neuralgia and lung cancer status post right total lung resection.  She presented to the ER this time with right flank pain and dysuria.  She was recently found to have a left sided renal mass during outpatient workup and is scheduled for follow-up with urology this month.  In the ER UA was abnormal.  She was without fever or leukocytosis.  White blood cell count is now elevated today.  Blood cultures now today have isolated gram-negative rods.  PCR pending.  Urine cultures pending.  CT imaging was obtained which showed the mild right-sided hydroureteronephrosis and perinephric stranding.  There was also diffuse bladder wall thickening suggestive of cystitis.  Patient also with colonic diverticulosis.  Left-sided renal mass again noted.  Prior CTA from 10/9 reviewed which on that imaging there was irregular wall thickening and question of possible outlet obstruction changes at the bladder.  Patient was noted to be tachycardic on presentation.  She remains tachycardic today.  Chemistry is otherwise unremarkable.  CBC otherwise unremarkable.  Previous urology evaluations  reviewed.  Patient's last cystoscopy was in 2023.  Recent urine cultures and blood cultures reviewed.  Patient has not been seen by our service previously.  No other extensive admissions noted in our system.  We are consulted at this time for further assistance with management given patient's presentation of pyelonephritis with leukocytosis.    On evaluation, patient's family is present at bedside.  We reviewed her current imaging and plans for antibiotics.  She reports overall feeling better since admission.  She had some pain with urination this last time but symptoms overall improved.  She primarily came in for the flank discomfort and nausea and vomiting.  She has had issues with recurrent urinary tract infections and I reviewed her prior cystoscopy and urology evaluations.  Despite having a neurogenic bladder she does not straight cath.  She however leaks urine constantly during the day.  She knows that she is not emptying completely either.  She unfortunately has not consistent with timed voiding.  Additionally fluid hydration and constipation may also be contributing.  She has been consistent with vaginal estrogen use at least 3 times a week.  Denies having any extensive fungal infections in the groin.  She is not on any disease modifying agents for her MS but has some issues with left groin/hip discomfort compared to the right which was present in the past.  We discussed continued antibiotic at this point, contacting the micro lab for updates on PCR and following up cultures to determine final regimen.  I reviewed with the patient that given the issues she and family have described above with likely poor emptying overall that we would likely not recommend chronic suppressive antibiotic until these issues have been addressed.  Additional questions were answered.  I also discussed with them likely anticipate a repeat cystoscopy when they follow-up with urology.    REVIEW OF SYSTEMS:  A complete 12 point  system-based review of systems is negative other than that noted in the HPI.    PAST MEDICAL HISTORY:  Past Medical History:   Diagnosis Date    Cancer (HCC)     only has the left lung     MS (multiple sclerosis) (HCC)     Neurogenic bladder     Trigeminal neuralgia      Past Surgical History:   Procedure Laterality Date    LUNG REMOVAL, TOTAL      right        FAMILY HISTORY:  Non-contributory    SOCIAL HISTORY:  Social History   Social History     Substance and Sexual Activity   Alcohol Use Yes    Comment: Occ.     Social History     Substance and Sexual Activity   Drug Use Never     Social History     Tobacco Use   Smoking Status Former    Passive exposure: Past (as a teenager growing up)   Smokeless Tobacco Never       ALLERGIES:  No Known Allergies    MEDICATIONS:  All current active medications have been reviewed.    PHYSICAL EXAM:  Temp:  [98.1 °F (36.7 °C)-98.3 °F (36.8 °C)] 98.1 °F (36.7 °C)  HR:  [] 103  Resp:  [17-20] 19  BP: (101-165)/(53-88) 114/60  SpO2:  [93 %-99 %] 95 %  Temp (24hrs), Av.2 °F (36.8 °C), Min:98.1 °F (36.7 °C), Max:98.3 °F (36.8 °C)  Current: Temperature: 98.1 °F (36.7 °C)    Intake/Output Summary (Last 24 hours) at 10/16/2024 1438  Last data filed at 10/16/2024 1329  Gross per 24 hour   Intake 470 ml   Output --   Net 470 ml       General Appearance:  Appearing well, nontoxic, and in no distress; able to provide detailed history   Head:  Normocephalic, without obvious abnormality, atraumatic   Eyes:  Conjunctiva pink and sclera anicteric, both eyes   Nose: Nares normal, mucosa normal, no drainage   Throat: Oropharynx moist without lesions   Neck: Supple, symmetrical, no adenopathy, no tenderness/mass/nodules   Back:   Symmetric, no curvature, ROM normal, right-sided CVA tenderness noted.  No spinal or paraspinal muscle tenderness to palpation.  No discomfort on the left.   Lungs:   Clear breath sounds on the left.   Chest Wall:  No tenderness or deformity   Heart:  RRR; no  murmur, rub or gallop noted   Abdomen:   Soft, non-tender, non-distended, positive bowel sounds; no suprapubic discomfort appreciated.   Extremities: No cyanosis, clubbing or edema; evaluated the left groin and hip and there is no acute external changes appreciated.   Skin: No rashes or lesions. No draining wounds noted.   Lymph nodes: Cervical, supraclavicular nodes normal   Neurologic: Alert and oriented times 3, extremity strength 5/5 and symmetric       LABS, IMAGING, & OTHER STUDIES:  In completing this consult I have performed an extensive review of the medical records in epic including review of the notes, radiographs, and laboratory results as detailed below.     Lab Results:  I have personally reviewed pertinent labs.  Comments/Interpretations: White blood cell count elevated today.    Results from last 7 days   Lab Units 10/16/24  0447 10/15/24  1814   WBC Thousand/uL 21.20* 7.54   HEMOGLOBIN g/dL 10.1* 11.7   PLATELETS Thousands/uL 156 201     Results from last 7 days   Lab Units 10/16/24  0447   POTASSIUM mmol/L 3.6   CHLORIDE mmol/L 105   CO2 mmol/L 26   BUN mg/dL 21   CREATININE mg/dL 0.99   EGFR ml/min/1.73sq m 53   CALCIUM mg/dL 8.0*     Results from last 7 days   Lab Units 10/15/24  1945 10/15/24  1943   BLOOD CULTURE  Received in Microbiology Lab. Culture in Progress.  --    GRAM STAIN RESULT   --  Gram negative rods*       Imaging Studies:   I have personally reviewed pertinent imaging study reports and images in PACS.  Comments/Interpretations:  CT imaging reviewed with mild right-sided hydroureteronephrosis, perinephric stranding and also bladder wall thickening.  Imaging also notable for colonic diverticulosis.    Other Studies:   I have personally reviewed other pertinent reports as below.  Records in CareEverywhere: No recent admissions.  Recent oncology visit reviewed    Nursing home/EMS Records: None    Current/Prior Cultures: Previous cultures predominantly with Proteus and various   gram-positive's such as Enterococcus and Aerococcus.

## 2024-10-16 NOTE — ASSESSMENT & PLAN NOTE
This is a complication of patient's MS.  Course complicated by the above.  Will monitor for retention while admitted.  Follow-up with urology as outpatient.

## 2024-10-16 NOTE — ASSESSMENT & PLAN NOTE
Tachycardia with leukocytosis.  This is due to #2 and #3.  Patient is otherwise hemodynamically stable.  Blood cultures and urine cultures are pending.  PCR pending.  Continue ceftriaxone 1 g every 24 hours based on prior cultures  Follow-up blood culture PCR testing  Repeat CBC/chemistry tomorrow to monitor treatment response/dosing  Follow-up pending urine cultures  Monitor for resolution of symptoms  Monitor for urinary retention, retention protocol ordered  Additional supportive cares per primary  Additional interventions pending clinical course

## 2024-10-16 NOTE — ASSESSMENT & PLAN NOTE
Present on admission  CT Renal Stone (10/15/24): 1.  Mild right hydroureteronephrosis, with diffuse right perinephric and periureteral stranding. There is also diffuse urinary bladder wall thickening and surrounding stranding. This suggests a cystitis and findings concerning for right pyelonephritis. 2.  Colonic diverticulosis. 3.  Punctate nonobstructing left renal calculus.  Patient does have history of recurrent UTIs, follows with Urology  Takes Hiprex/Estrace at home typically.  Continue with IV Ceftriaxone for now

## 2024-10-16 NOTE — CASE MANAGEMENT
Case Management Assessment & Discharge Planning Note    Patient name Francesca Retana  Location /-01 MRN [id number]  : 1945 Date 10/16/2024       Current Admission Date: 10/15/2024  Current Admission Diagnosis:Sepsis (HCC)   Patient Active Problem List    Diagnosis Date Noted Date Diagnosed    Sepsis (HCC) 10/16/2024     Gram-negative bacteremia 10/16/2024     Neurogenic bladder 10/16/2024     Recurrent urinary tract infection 10/16/2024     Pyelonephritis of right kidney 10/15/2024     Left renal mass 10/15/2024     Multiple sclerosis (HCC) 10/15/2024     Trigeminal neuralgia 10/15/2024     History of lung cancer 10/15/2024       LOS (days): 1  Geometric Mean LOS (GMLOS) (days): 2.8  Days to GMLOS:2     OBJECTIVE:    Risk of Unplanned Readmission Score: 12.78         Current admission status: Inpatient       Preferred Pharmacy:   [facility]/pharmacy #[id number] - [place], [place] - [address]  [address]  [facility] [address]  Phone: [phone] Fax: [phone]    Primary Care Provider: Jazz Tripp MD    Primary Insurance: [employer] REP  Secondary Insurance:     ASSESSMENT:  Active Health Care Proxies    There are no active Health Care Proxies on file.       Advance Directives  Does patient have a Health Care POA?: No  Was patient offered paperwork?: Yes  Does patient currently have a Health Care decision maker?: Yes, please see Health Care Proxy section  Does patient have Advance Directives?: No  Was patient offered paperwork?: Yes  Primary Contact: Both daughters         Readmission Root Cause  30 Day Readmission: No    Patient Information  Admitted from:: Home  Mental Status: Alert  During Assessment patient was accompanied by: Daughter  Assessment information provided by:: Patient  Primary Caregiver: Self  Support Systems: Self, Children  County of Residence: [place]  What city do you live in?: [place]  Home entry access options. Select all that apply.: Stairs  Number  of steps to enter home.: 2  Do the steps have railings?: Yes  Type of Current Residence: [facility]  Living Arrangements: Lives Alone  Is patient a ?: No    Activities of Daily Living Prior to Admission  Functional Status: Independent  Completes ADLs independently?: Yes  Ambulates independently?: Yes  Does patient use assisted devices?: Yes  Assisted Devices (DME) used: Walker, Rollator, Bedside Commode, Shower Chair, Straight Cane  Does patient currently own DME?: Yes  What DME does the patient currently own?: Bedside Commode, Rollator, Straight Cane, Walker, Shower Chair  Does patient have a history of Outpatient Therapy (PT/OT)?: Yes  Does the patient have a history of Short-Term Rehab?: Yes  Does patient have a history of HHC?: Yes  Does patient currently have HHC?: No    Current Home Health Care  Home Health Agency Name:: Other    Patient Information Continued  Income Source: SSI/SSD  Does patient have prescription coverage?: Yes  Does patient receive dialysis treatments?: No  Does patient have a history of substance abuse?: No  Does patient have a history of Mental Health Diagnosis?: No         Means of Transportation  Means of Transport to Appts:: Drives Self      Social Determinants of Health (SDOH)      Flowsheet Row Most Recent Value   Housing Stability    In the last 12 months, was there a time when you were not able to pay the mortgage or rent on time? N   In the past 12 months, how many times have you moved where you were living? 0   At any time in the past 12 months, were you homeless or living in a shelter (including now)? N   Transportation Needs    In the past 12 months, has lack of transportation kept you from medical appointments or from getting medications? no   In the past 12 months, has lack of transportation kept you from meetings, work, or from getting things needed for daily living? No   Food Insecurity    Within the past 12 months, you worried that your food would run out before you got  the money to buy more. Never true   Within the past 12 months, the food you bought just didn't last and you didn't have money to get more. Never true   Utilities    In the past 12 months has the electric, gas, oil, or water company threatened to shut off services in your home? No            DISCHARGE DETAILS:    Discharge planning discussed with:: Patient at bedside  Freedom of Choice: Yes  Comments - Freedom of Choice: CM discussed freedom of choice as it pertains to discharge planning. Patient is alert oriented and competent to make decisions. Introduced self and role, explained role of CM in discharge planning. CM discussed current living situation and needs at discharge. Patient declined DME and rehab. Patient is agreeable to hhc. Pt is aware and encouraged to seek CM for any questions or concerns. CM continues to follow.  CM contacted family/caregiver?: Yes  Were Treatment Team discharge recommendations reviewed with patient/caregiver?: Yes  Did patient/caregiver verbalize understanding of patient care needs?: Yes  Were patient/caregiver advised of the risks associated with not following Treatment Team discharge recommendations?: Yes    Contacts  Patient Contacts: Anne  Contact Method: In Person  Reason/Outcome: Discharge Planning    Requested Home Health Care         Is the patient interested in HHC at discharge?: Yes  Home Health Discipline requested:: Occupational Therapy, Physical Therapy, Nursing  Home Health Agency Name:: Other  Home Health Follow-Up Provider:: PCP  Home Health Services Needed:: Evaluate Functional Status and Safety, Strengthening/Theraputic Exercises to Improve Function, Gait/ADL Training  Homebound Criteria Met:: Requires the Assistance of Another Person for Safe Ambulation or to Leave the Home, Uses an Assist Device (i.e. cane, walker, etc)  Supporting Clincal Findings:: Limited Endurance, Fatigues Easliy in Short Distances    DME Referral Provided  Referral made for DME?: No    Other  Referral/Resources/Interventions Provided:  Interventions: HHC  Referral Comments: HHC referral sent    Would you like to participate in our [facility] service program?  : No - Declined    Treatment Team Recommendation: Other (TBD)  Discharge Destination Plan:: Home with Home Health Care  Transport at Discharge : Family

## 2024-10-16 NOTE — PLAN OF CARE
Problem: INFECTION - ADULT  Goal: Absence or prevention of progression during hospitalization  Description: INTERVENTIONS:  - Assess and monitor for signs and symptoms of infection  - Monitor lab/diagnostic results  - Monitor all insertion sites, i.e. indwelling lines, tubes, and drains  - Monitor endotracheal if appropriate and nasal secretions for changes in amount and color  - Strong appropriate cooling/warming therapies per order  - Administer medications as ordered  - Instruct and encourage patient and family to use good hand hygiene technique  - Identify and instruct in appropriate isolation precautions for identified infection/condition  Outcome: Progressing     Problem: INFECTION - ADULT  Goal: Absence of fever/infection during neutropenic period  Description: INTERVENTIONS:  - Monitor WBC    Outcome: Progressing     Problem: Knowledge Deficit  Goal: Patient/family/caregiver demonstrates understanding of disease process, treatment plan, medications, and discharge instructions  Description: Complete learning assessment and assess knowledge base.  Interventions:  - Provide teaching at level of understanding  - Provide teaching via preferred learning methods  Outcome: Progressing

## 2024-10-17 PROBLEM — B96.4 BACTEREMIA DUE TO PROTEUS SPECIES: Status: ACTIVE | Noted: 2024-10-16

## 2024-10-17 LAB
ANION GAP SERPL CALCULATED.3IONS-SCNC: 8 MMOL/L (ref 4–13)
BACTERIA UR CULT: ABNORMAL
BACTERIA UR CULT: ABNORMAL
BUN SERPL-MCNC: 20 MG/DL (ref 5–25)
CALCIUM SERPL-MCNC: 8.2 MG/DL (ref 8.4–10.2)
CHLORIDE SERPL-SCNC: 103 MMOL/L (ref 96–108)
CO2 SERPL-SCNC: 26 MMOL/L (ref 21–32)
CREAT SERPL-MCNC: 0.73 MG/DL (ref 0.6–1.3)
ERYTHROCYTE [DISTWIDTH] IN BLOOD BY AUTOMATED COUNT: 14.2 % (ref 11.6–15.1)
GFR SERPL CREATININE-BSD FRML MDRD: 78 ML/MIN/1.73SQ M
GLUCOSE SERPL-MCNC: 86 MG/DL (ref 65–140)
HCT VFR BLD AUTO: 33.8 % (ref 34.8–46.1)
HGB BLD-MCNC: 10.7 G/DL (ref 11.5–15.4)
MAGNESIUM SERPL-MCNC: 1.9 MG/DL (ref 1.9–2.7)
MCH RBC QN AUTO: 31 PG (ref 26.8–34.3)
MCHC RBC AUTO-ENTMCNC: 31.7 G/DL (ref 31.4–37.4)
MCV RBC AUTO: 98 FL (ref 82–98)
PLATELET # BLD AUTO: 146 THOUSANDS/UL (ref 149–390)
PMV BLD AUTO: 10.6 FL (ref 8.9–12.7)
POTASSIUM SERPL-SCNC: 3.2 MMOL/L (ref 3.5–5.3)
RBC # BLD AUTO: 3.45 MILLION/UL (ref 3.81–5.12)
SODIUM SERPL-SCNC: 137 MMOL/L (ref 135–147)
WBC # BLD AUTO: 12.46 THOUSAND/UL (ref 4.31–10.16)

## 2024-10-17 PROCEDURE — 83735 ASSAY OF MAGNESIUM: CPT | Performed by: NURSE PRACTITIONER

## 2024-10-17 PROCEDURE — 99232 SBSQ HOSP IP/OBS MODERATE 35: CPT | Performed by: NURSE PRACTITIONER

## 2024-10-17 PROCEDURE — 99233 SBSQ HOSP IP/OBS HIGH 50: CPT | Performed by: INTERNAL MEDICINE

## 2024-10-17 PROCEDURE — 85027 COMPLETE CBC AUTOMATED: CPT | Performed by: NURSE PRACTITIONER

## 2024-10-17 PROCEDURE — 80048 BASIC METABOLIC PNL TOTAL CA: CPT | Performed by: NURSE PRACTITIONER

## 2024-10-17 RX ORDER — POTASSIUM CHLORIDE 1500 MG/1
40 TABLET, EXTENDED RELEASE ORAL ONCE
Status: COMPLETED | OUTPATIENT
Start: 2024-10-17 | End: 2024-10-17

## 2024-10-17 RX ADMIN — VITAM B12 100 MCG: 100 TAB at 09:06

## 2024-10-17 RX ADMIN — CEFTRIAXONE SODIUM 1000 MG: 10 INJECTION, POWDER, FOR SOLUTION INTRAVENOUS at 20:49

## 2024-10-17 RX ADMIN — ENOXAPARIN SODIUM 40 MG: 40 INJECTION SUBCUTANEOUS at 09:06

## 2024-10-17 RX ADMIN — Medication 2000 UNITS: at 09:06

## 2024-10-17 RX ADMIN — CARBAMAZEPINE 200 MG: 200 TABLET ORAL at 09:06

## 2024-10-17 RX ADMIN — POTASSIUM CHLORIDE 40 MEQ: 1500 TABLET, EXTENDED RELEASE ORAL at 10:21

## 2024-10-17 RX ADMIN — CARBAMAZEPINE 200 MG: 200 TABLET ORAL at 18:47

## 2024-10-17 RX ADMIN — ASPIRIN 81 MG: 81 TABLET, COATED ORAL at 09:06

## 2024-10-17 NOTE — ASSESSMENT & PLAN NOTE
Patient presented predominantly with right flank pain and nausea and vomiting.  CT imaging confirms ascending urinary tract changes.  Course complicated by the above.  Suspect that this is related to issues below and overall poor emptying.  Prior urine cultures reviewed.  Continue antibiotic as above  Follow-up pending culture data  Trend fever curve/WBC  Monitor for urinary retention  Follow-up with urology as outpatient  No plans for suppressive antibiotic as below  Anticipate 10 days of antibiotic for this issue  Hopeful transition to oral antibiotic closer to discharge

## 2024-10-17 NOTE — CASE MANAGEMENT
Case Management Progress Note    Patient name Francesca Retana  Location /-01 MRN [id number]  : 1945 Date 10/17/2024       LOS (days): 2  Geometric Mean LOS (GMLOS) (days): 3.8  Days to GMLOS:2.1        OBJECTIVE:        Current admission status: Inpatient  Preferred Pharmacy:   [facility]pharm[id number] - [place] - [address]  [address]  [place] [address]  Phone: [phone] Fax: [phone]    Primary Care Provider: Jazz Tripp MD    Primary Insurance: [employer] [facility] REP  Secondary Insurance:     PROGRESS NOTE:  As per SLIM rounds, The patient will continue to require additional inpatient hospital stay due to ongoing treatment in setting of bacteremia. Pt is anticipated for  discharge within 24-48 hrs. CM continue to follow and assist with pt dc plans.

## 2024-10-17 NOTE — ASSESSMENT & PLAN NOTE
This is a complication of patient's MS.  Course complicated by the above.  Will monitor for retention while admitted.  Follow-up with urology as outpatient.   pain

## 2024-10-17 NOTE — PLAN OF CARE
Problem: PAIN - ADULT  Goal: Verbalizes/displays adequate comfort level or baseline comfort level  Description: Interventions:  - Encourage patient to monitor pain and request assistance  - Assess pain using appropriate pain scale  - Administer analgesics based on type and severity of pain and evaluate response  - Implement non-pharmacological measures as appropriate and evaluate response  - Consider cultural and social influences on pain and pain management  - Notify physician/advanced practitioner if interventions unsuccessful or patient reports new pain  Outcome: Progressing     Problem: INFECTION - ADULT  Goal: Absence or prevention of progression during hospitalization  Description: INTERVENTIONS:  - Assess and monitor for signs and symptoms of infection  - Monitor lab/diagnostic results  - Monitor all insertion sites, i.e. indwelling lines, tubes, and drains  - Monitor endotracheal if appropriate and nasal secretions for changes in amount and color  - [place] appropriate cooling/warming therapies per order  - Administer medications as ordered  - Instruct and encourage patient and family to use good hand hygiene technique  - Identify and instruct in appropriate isolation precautions for identified infection/condition  Outcome: Progressing  Goal: Absence of fever/infection during neutropenic period  Description: INTERVENTIONS:  - Monitor WBC    Outcome: Progressing     Problem: SAFETY ADULT  Goal: Patient will remain free of falls  Description: INTERVENTIONS:  - Educate patient/family on patient safety including physical limitations  - Instruct patient to call for assistance with activity   - Consult OT/PT to assist with strengthening/mobility   - Keep Call bell within reach  - Keep bed low and locked with side rails adjusted as appropriate  - Keep care items and personal belongings within reach  - Initiate and maintain comfort rounds  - Make Fall Risk Sign visible to staff  - Offer Toileting every 2 Hours,  in advance of need  - Initiate/Maintain alarm  - Obtain necessary fall risk management equipment  - Apply yellow socks and bracelet for high fall risk patients  - Consider moving patient to room near nurses station  Outcome: Progressing  Goal: Maintain or return to baseline ADL function  Description: INTERVENTIONS:  -  Assess patient's ability to carry out ADLs; assess patient's baseline for ADL function and identify physical deficits which impact ability to perform ADLs (bathing, care of mouth/teeth, toileting, grooming, dressing, etc.)  - Assess/evaluate cause of self-care deficits   - Assess range of motion  - Assess patient's mobility; develop plan if impaired  - Assess patient's need for assistive devices and provide as appropriate  - Encourage maximum independence but intervene and supervise when necessary  - Involve family in performance of ADLs  - Assess for home care needs following discharge   - Consider OT consult to assist with ADL evaluation and planning for discharge  - Provide patient education as appropriate  Outcome: Progressing  Goal: Maintains/Returns to pre admission functional level  Description: INTERVENTIONS:  - Perform AM-PAC 6 Click Basic Mobility/ Daily Activity assessment daily.  - Set and communicate daily mobility goal to care team and patient/family/caregiver.   - Collaborate with rehabilitation services on mobility goals if consulted  - Perform Range of Motion   - Reposition patient   - Dangle patient   - Stand patient   - Ambulate patient   - Out of bed to chair   - Out of bed for meals   - Out of bed for toileting  - Record patient progress and toleration of activity level   Outcome: Progressing     Problem: DISCHARGE PLANNING  Goal: Discharge to home or other facility with appropriate resources  Description: INTERVENTIONS:  - Identify barriers to discharge w/patient and caregiver  - Arrange for needed discharge resources and transportation as appropriate  - Identify discharge  learning needs (meds, wound care, etc.)  - Arrange for interpretive services to assist at discharge as needed  - Refer to Case Management Department for coordinating discharge planning if the patient needs post-hospital services based on physician/advanced practitioner order or complex needs related to functional status, cognitive ability, or social support system  Outcome: Progressing     Problem: Knowledge Deficit  Goal: Patient/family/caregiver demonstrates understanding of disease process, treatment plan, medications, and discharge instructions  Description: Complete learning assessment and assess knowledge base.  Interventions:  - Provide teaching at level of understanding  - Provide teaching via preferred learning methods  Outcome: Progressing

## 2024-10-17 NOTE — ASSESSMENT & PLAN NOTE
1 out of 2 blood cultures from admission have isolated gram-negative rods.  PCR with Proteus.  Prior culture data reviewed.  Sources #3.  No other obvious sources on exam or imaging.  Patient reporting symptomatic improvement.  Continue ceftriaxone for now  Follow-up pending blood and urine cultures  Repeat CBC/chemistry tomorrow  Monitor for urinary retention.  Urology evaluation noted  Additional supportive cares per primary  Will adjust antibiotics further based on culture data  Anticipate 10 days of antibiotic for #3  Hopeful transition to oral therapy closer to discharge

## 2024-10-17 NOTE — ASSESSMENT & PLAN NOTE
Patient originally presented to the ED with complaints of right flank pain with dysuria  Secondary to right pyelonephritis, seen on CT imaging.  Sepsis evolving since admission, initially had tachycardia but no other criteria, this morning patient is still tachycardic with leukocytosis.  Lactic acid level 2.3 on admission, resolved with IVF 1.1  UA positive with Nitrites/Leukocytes, with innumerable WBC/Bacteria  Urine culture + klebsiella pneumoniae, proteus  Blood cultures + gram - rods, BCID proteus  ID Consult, appreciate ongoing recommendations  Continue with 1 g IV Ceftriaxone daily  Anticipate 10 day treatment

## 2024-10-17 NOTE — ASSESSMENT & PLAN NOTE
Present on admission  CT Renal Stone (10/15/24): 1.  Mild right hydroureteronephrosis, with diffuse right perinephric and periureteral stranding. There is also diffuse urinary bladder wall thickening and surrounding stranding. This suggests a cystitis and findings concerning for right pyelonephritis. 2.  Colonic diverticulosis. 3.  Punctate nonobstructing left renal calculus.  Patient does have history of recurrent UTIs, follows with Urology  Takes Hiprex/Estrace at home typically.  Continue with IV Ceftriaxone for now  ID following  Urology consulted as well

## 2024-10-17 NOTE — ASSESSMENT & PLAN NOTE
Tachycardia with leukocytosis.  This is due to #2 and #3.  Patient is otherwise hemodynamically stable.  White blood cell count and clinical parameters improving.  Continue ceftriaxone 1 g every 24 hours based on prior cultures  Follow-up pending blood and urine cultures  Repeat CBC/chemistry tomorrow to monitor treatment response/dosing  Monitor for urinary retention, retention protocol ordered  Additional supportive cares per primary  Additional interventions pending clinical course

## 2024-10-17 NOTE — ASSESSMENT & PLAN NOTE
Patient is not on any disease modifying agents and has not been since she was 73.  Course complicated by the above.  Supportive care otherwise per primary.

## 2024-10-17 NOTE — PROGRESS NOTES
Progress Note - Hospitalist   Name: Francesca Retana 78 y.o. female I MRN: [id number]  Unit/Bed#: -01 I Date of Admission: 10/15/2024   Date of Service: 10/17/2024 I Hospital Day: 2    Assessment & Plan  Sepsis (HCC)  Patient originally presented to the ED with complaints of right flank pain with dysuria  Secondary to right pyelonephritis, seen on CT imaging.  Sepsis evolving since admission, initially had tachycardia but no other criteria, this morning patient is still tachycardic with leukocytosis.  Lactic acid level 2.3 on admission, resolved with IVF 1.1  UA positive with Nitrites/Leukocytes, with innumerable WBC/Bacteria  Urine culture + klebsiella pneumoniae, proteus  Blood cultures + gram - rods, BCID proteus  ID Consult, appreciate ongoing recommendations  Continue with 1 g IV Ceftriaxone daily  Anticipate 10 day treatment  Pyelonephritis of right kidney  Present on admission  CT Renal Stone (10/15/24): 1.  Mild right hydroureteronephrosis, with diffuse right perinephric and periureteral stranding. There is also diffuse urinary bladder wall thickening and surrounding stranding. This suggests a cystitis and findings concerning for right pyelonephritis. 2.  Colonic diverticulosis. 3.  Punctate nonobstructing left renal calculus.  Patient does have history of recurrent UTIs, follows with Urology  Takes Hiprex/Estrace at home typically.  Continue with IV Ceftriaxone for now  ID following  Urology consulted as well  Left renal mass  Known left renal cyst  Follows with Urology  Patient gets serial monitoring US yearly.  Multiple sclerosis (HCC)  Chronic history  Follows with [facility] - Neurology  Trigeminal neuralgia  Follows with [facility] - Neurology  Continue Carbamazepine  Gram-negative bacteremia  See plan as noted above  Neurogenic bladder  Secondary to multiple sclerosis  Will monitor PVR bladder scan  Recurrent urinary tract infection      VTE Pharmacologic Prophylaxis:    Enoxaparin    Mobility:   Basic  Mobility Inpatient Raw Score: 21  JH-HLM Goal: 6: Walk 10 steps or more  JH-HLM Achieved: 6: Walk 10 steps or more  JH-HLM Goal achieved. Continue to encourage appropriate mobility.    Patient Centered Rounds: I performed bedside rounds with nursing staff today.   Discussions with Specialists or Other Care Team Provider: Case Management    Education and Discussions with Family / Patient: Patient declined call to .     Current Length of Stay: 2 day(s)  Current Patient Status: Inpatient   Certification Statement: The patient will continue to require additional inpatient hospital stay due to ongoing treatment in setting of bacteremia  Discharge Plan: Anticipate discharge in 24-48 hrs to home with home services.    Code Status: Level 1 - Full Code    Subjective   Patient resting in recliner chair, no complaints of chest pain, shortness of breath, fever or chills.  Feels that her overall appetite is improving.    Objective :  Temp:  [97.9 °F (36.6 °C)-98.7 °F (37.1 °C)] 97.9 °F (36.6 °C)  HR:  [] 100  BP: (120-129)/(63-74) 129/74  SpO2:  [88 %-97 %] 96 %  O2 Device: None (Room air)    Body mass index is 20.76 kg/m².     Input and Output Summary (last 24 hours):     Intake/Output Summary (Last 24 hours) at 10/17/2024 1301  Last data filed at 10/17/2024 1252  Gross per 24 hour   Intake 1070 ml   Output 150 ml   Net 920 ml       Physical Exam  Vitals and nursing note reviewed.   Constitutional:       General: She is not in acute distress.     Appearance: She is normal weight. She is not ill-appearing.   Cardiovascular:      Rate and Rhythm: Normal rate.      Pulses: Normal pulses.   Pulmonary:      Effort: Pulmonary effort is normal.   Abdominal:      Palpations: Abdomen is soft.   Musculoskeletal:         General: Normal range of motion.   Skin:     General: Skin is warm and dry.      Capillary Refill: Capillary refill takes less than 2 seconds.   Neurological:      Mental Status: She is alert and  oriented to person, place, and time.   Psychiatric:         Mood and Affect: Mood normal.           Lines/Drains:              Lab Results: I have reviewed the following results:   Results from last 7 days   Lab Units 10/17/24  0444 10/16/24  0447 10/15/24  1814   WBC Thousand/uL 12.46*   < > 7.54   HEMOGLOBIN g/dL 10.7*   < > 11.7   HEMATOCRIT % 33.8*   < > 37.0   PLATELETS Thousands/uL 146*   < > 201   BANDS PCT %  --   --  1   LYMPHO PCT %  --   --  5*   MONO PCT %  --   --  2*   EOS PCT %  --   --  0    < > = values in this interval not displayed.     Results from last 7 days   Lab Units 10/17/24  0444   SODIUM mmol/L 137   POTASSIUM mmol/L 3.2*   CHLORIDE mmol/L 103   CO2 mmol/L 26   BUN mg/dL 20   CREATININE mg/dL 0.73   ANION GAP mmol/L 8   CALCIUM mg/dL 8.2*   GLUCOSE RANDOM mg/dL 86                 Results from last 7 days   Lab Units 10/16/24  0002 10/15/24  1943   LACTIC ACID mmol/L 1.1 2.3*       Recent Cultures (last 7 days):   Results from last 7 days   Lab Units 10/15/24  1945 10/15/24  1943/24  1837   GRAM STAIN RESULT  Gram negative rods* Gram negative rods*  --    URINE CULTURE   --   --  >100,000 cfu/ml Klebsiella pneumoniae*  Proteus mirabilis*       Imaging Results Review: No pertinent imaging studies reviewed.  Other Study Results Review: No additional pertinent studies reviewed.    Last 24 Hours Medication List:     Current Facility-Administered Medications:     acetaminophen (TYLENOL) tablet 650 mg, Q6H PRN    aluminum-magnesium hydroxide-simethicone (MAALOX) oral suspension 30 mL, Q4H PRN    aspirin (ECOTRIN LOW STRENGTH) EC tablet 81 mg, Daily    carBAMazepine (TEGretol) tablet 200 mg, BID    ceftriaxone (ROCEPHIN) 1 g/50 mL in dextrose IVPB, Q24H, Last Rate: 1,000 mg (10/16/24 2003)    Cholecalciferol (VITAMIN D3) tablet 2,000 Units, Daily    cyanocobalamin (VITAMIN B-12) tablet 100 mcg, Daily    enoxaparin (LOVENOX) subcutaneous injection 40 mg, Daily    ondansetron (ZOFRAN)  injection 4 mg, Q8H PRN    Administrative Statements   Today, Patient Was Seen By: MARYSE Spann  I have spent a total time of 41 minutes in caring for this patient on the day of the visit/encounter including Instructions for management, Patient and family education, Importance of tx compliance, Risk factor reductions, Impressions, Counseling / Coordination of care, Documenting in the medical record, Reviewing / ordering tests, medicine, procedures  , and Communicating with other healthcare professionals .    **Please Note: This note may have been constructed using a voice recognition system.**

## 2024-10-17 NOTE — PLAN OF CARE
Problem: INFECTION - ADULT  Goal: Absence or prevention of progression during hospitalization  Description: INTERVENTIONS:  - Assess and monitor for signs and symptoms of infection  - Monitor lab/diagnostic results  - Monitor all insertion sites, i.e. indwelling lines, tubes, and drains  - Monitor endotracheal if appropriate and nasal secretions for changes in amount and color  - [place] appropriate cooling/warming therapies per order  - Administer medications as ordered  - Instruct and encourage patient and family to use good hand hygiene technique  - Identify and instruct in appropriate isolation precautions for identified infection/condition  Outcome: Progressing  Goal: Absence of fever/infection during neutropenic period  Description: INTERVENTIONS:  - Monitor WBC    Outcome: Progressing     Problem: DISCHARGE PLANNING  Goal: Discharge to home or other facility with appropriate resources  Description: INTERVENTIONS:  - Identify barriers to discharge w/patient and caregiver  - Arrange for needed discharge resources and transportation as appropriate  - Identify discharge learning needs (meds, wound care, etc.)  - Arrange for interpretive services to assist at discharge as needed  - Refer to Case Management Department for coordinating discharge planning if the patient needs post-hospital services based on physician/advanced practitioner order or complex needs related to functional status, cognitive ability, or social support system  Outcome: Progressing

## 2024-10-17 NOTE — ASSESSMENT & PLAN NOTE
Recently diagnosed on imaging from 10/9.  Patient also with prior history of lung cancer requiring R pneumonectomy.  Tentative plans are for follow-up with urology  Continue antibiotics as above  Continue plans for follow-up with urology  Anticipate patient will need updated cystoscopy  Additional care per primary

## 2024-10-17 NOTE — PROGRESS NOTES
Progress Note - Infectious Disease   Name: Francesca Retana 78 y.o. female I MRN: [id number]  Unit/Bed#: -01 I Date of Admission: 10/15/2024   Date of Service: 10/17/2024 I Hospital Day: 2     Assessment & Plan  Sepsis (HCC)  Tachycardia with leukocytosis.  This is due to #2 and #3.  Patient is otherwise hemodynamically stable.  White blood cell count and clinical parameters improving.  Continue ceftriaxone 1 g every 24 hours based on prior cultures  Follow-up pending blood and urine cultures  Repeat CBC/chemistry tomorrow to monitor treatment response/dosing  Monitor for urinary retention, retention protocol ordered  Additional supportive cares per primary  Additional interventions pending clinical course  Bacteremia due to Proteus species  1 out of 2 blood cultures from admission have isolated gram-negative rods.  PCR with Proteus.  Prior culture data reviewed.  Sources #3.  No other obvious sources on exam or imaging.  Patient reporting symptomatic improvement.  Continue ceftriaxone for now  Follow-up pending blood and urine cultures  Repeat CBC/chemistry tomorrow  Monitor for urinary retention.  Urology evaluation noted  Additional supportive cares per primary  Will adjust antibiotics further based on culture data  Anticipate 10 days of antibiotic for #3  Hopeful transition to oral therapy closer to discharge  Pyelonephritis of right kidney  Patient presented predominantly with right flank pain and nausea and vomiting.  CT imaging confirms ascending urinary tract changes.  Course complicated by the above.  Suspect that this is related to issues below and overall poor emptying.  Prior urine cultures reviewed.  Continue antibiotic as above  Follow-up pending culture data  Trend fever curve/WBC  Monitor for urinary retention  Follow-up with urology as outpatient  No plans for suppressive antibiotic as below  Anticipate 10 days of antibiotic for this issue  Hopeful transition to oral antibiotic closer to  discharge  Recurrent urinary tract infection  Patient's urology notes reviewed and at baseline she has a neurogenic bladder and what sounds like chronic urinary leakage and poor emptying.  She is consistent with estrogen use but is not consistent with fluid intake or timed voiding.  I suspect that these issues are leading to her recurrent infections.  Last cystoscopy was in 2023.  Most recent CT from 10/9 with left renal changes but also irregular thickening of the bladder.  No plans for suppressive antibiotic given these mechanical issues  Continue current antibiotic course as above  Follow-up with urology as outpatient  Anticipate patient will need updated cystoscopy  May need to consider straight catheterization as outpatient  Will monitor for retention while admitted  Encourage fluid hydration  Consider regular bowel regimen  Additional care per primary  Neurogenic bladder  This is a complication of patient's MS.  Course complicated by the above.  Will monitor for retention while admitted.  Follow-up with urology as outpatient.  Multiple sclerosis (HCC)  Patient is not on any disease modifying agents and has not been since she was 73.  Course complicated by the above.  Supportive care otherwise per primary.    Left renal mass  Recently diagnosed on imaging from 10/9.  Patient also with prior history of lung cancer requiring R pneumonectomy.  Tentative plans are for follow-up with urology  Continue antibiotics as above  Continue plans for follow-up with urology  Anticipate patient will need updated cystoscopy  Additional care per primary    Above plan discussed in detail with the patient at bedside  Above plan discussed in detail with primary service of inspected who is aware of plans for ongoing antibiotics pending further culture data    ID consult service will continue to follow.    Antibiotics:  Ceftriaxone 3    24 Hour events:  Yesterday and overnight notes reviewed and no acute events  noted.    Subjective:  Patient overall reports improvement of symptoms since admission.  Currently denies having any nausea, vomiting, chest pain.    Objective:  Vitals:  Temp:  [97.9 °F (36.6 °C)-98.7 °F (37.1 °C)] 97.9 °F (36.6 °C)  HR:  [] 100  BP: (120-129)/(63-74) 128/71  SpO2:  [88 %-97 %] 96 %  Temp (24hrs), Av.3 °F (36.8 °C), Min:97.9 °F (36.6 °C), Max:98.7 °F (37.1 °C)  Current: Temperature: 97.9 °F (36.6 °C)    Physical Exam:   General Appearance:  Alert, interactive, nontoxic, no acute distress.   Throat: Oropharynx moist without lesions.    Lungs:   Clear to auscultation bilaterally; no wheezes, rhonchi or rales; respirations unlabored on room air   Heart:  RRR; no murmur, rub or gallop noted   Abdomen:   Soft, non-tender, non-distended, positive bowel sounds.  No CVA tenderness appreciated on exam today.   Extremities: No clubbing, cyanosis or edema   Skin: No new rashes or lesions. No new draining wounds noted.       Labs, Imaging, & Other studies:   All pertinent labs and imaging studies in PACS were personally reviewed as below.  Results from last 7 days   Lab Units 10/17/24  0444 10/16/24  0447 10/15/24  1814   WBC Thousand/uL 12.46* 21.20* 7.54   HEMOGLOBIN g/dL 10.7* 10.1* 11.7   PLATELETS Thousands/uL 146* 156 201     Results from last 7 days   Lab Units 10/17/24  044   POTASSIUM mmol/L 3.2*   CHLORIDE mmol/L 103   CO2 mmol/L 26   BUN mg/dL 20   CREATININE mg/dL 0.73   EGFR ml/min/1.73sq m 78   CALCIUM mg/dL 8.2*     Results from last 7 days   Lab Units 10/15/24  1945 10/15/24  1943/24  183   BLOOD CULTURE  Proteus species* Proteus species*  --    GRAM STAIN RESULT  Gram negative rods* Gram negative rods*  --    URINE CULTURE   --   --  >100,000 cfu/ml Klebsiella pneumoniae*  Proteus mirabilis*       Lab interpretation/comments: White blood cell downtrended    Imaging interpretation/comments: No new imaging    Culture data: Blood cultures so far with Proteus    External notes:  None

## 2024-10-18 LAB
ANION GAP SERPL CALCULATED.3IONS-SCNC: 7 MMOL/L (ref 4–13)
BASOPHILS # BLD AUTO: 0.04 THOUSANDS/ΜL (ref 0–0.1)
BASOPHILS NFR BLD AUTO: 1 % (ref 0–1)
BUN SERPL-MCNC: 20 MG/DL (ref 5–25)
CALCIUM SERPL-MCNC: 8.6 MG/DL (ref 8.4–10.2)
CHLORIDE SERPL-SCNC: 105 MMOL/L (ref 96–108)
CO2 SERPL-SCNC: 28 MMOL/L (ref 21–32)
CREAT SERPL-MCNC: 0.71 MG/DL (ref 0.6–1.3)
EOSINOPHIL # BLD AUTO: 0.17 THOUSAND/ΜL (ref 0–0.61)
EOSINOPHIL NFR BLD AUTO: 2 % (ref 0–6)
ERYTHROCYTE [DISTWIDTH] IN BLOOD BY AUTOMATED COUNT: 14 % (ref 11.6–15.1)
GFR SERPL CREATININE-BSD FRML MDRD: 80 ML/MIN/1.73SQ M
GLUCOSE SERPL-MCNC: 87 MG/DL (ref 65–140)
HCT VFR BLD AUTO: 34.9 % (ref 34.8–46.1)
HGB BLD-MCNC: 11 G/DL (ref 11.5–15.4)
IMM GRANULOCYTES # BLD AUTO: 0.05 THOUSAND/UL (ref 0–0.2)
IMM GRANULOCYTES NFR BLD AUTO: 1 % (ref 0–2)
LYMPHOCYTES # BLD AUTO: 0.81 THOUSANDS/ΜL (ref 0.6–4.47)
LYMPHOCYTES NFR BLD AUTO: 11 % (ref 14–44)
MCH RBC QN AUTO: 30.8 PG (ref 26.8–34.3)
MCHC RBC AUTO-ENTMCNC: 31.5 G/DL (ref 31.4–37.4)
MCV RBC AUTO: 98 FL (ref 82–98)
MONOCYTES # BLD AUTO: 0.64 THOUSAND/ΜL (ref 0.17–1.22)
MONOCYTES NFR BLD AUTO: 9 % (ref 4–12)
NEUTROPHILS # BLD AUTO: 5.74 THOUSANDS/ΜL (ref 1.85–7.62)
NEUTS SEG NFR BLD AUTO: 76 % (ref 43–75)
NRBC BLD AUTO-RTO: 0 /100 WBCS
PLATELET # BLD AUTO: 153 THOUSANDS/UL (ref 149–390)
PMV BLD AUTO: 10.5 FL (ref 8.9–12.7)
POTASSIUM SERPL-SCNC: 3.5 MMOL/L (ref 3.5–5.3)
RBC # BLD AUTO: 3.57 MILLION/UL (ref 3.81–5.12)
SODIUM SERPL-SCNC: 140 MMOL/L (ref 135–147)
WBC # BLD AUTO: 7.45 THOUSAND/UL (ref 4.31–10.16)

## 2024-10-18 PROCEDURE — 99233 SBSQ HOSP IP/OBS HIGH 50: CPT | Performed by: INTERNAL MEDICINE

## 2024-10-18 PROCEDURE — 80048 BASIC METABOLIC PNL TOTAL CA: CPT | Performed by: INTERNAL MEDICINE

## 2024-10-18 PROCEDURE — 99232 SBSQ HOSP IP/OBS MODERATE 35: CPT | Performed by: NURSE PRACTITIONER

## 2024-10-18 PROCEDURE — 85025 COMPLETE CBC W/AUTO DIFF WBC: CPT | Performed by: INTERNAL MEDICINE

## 2024-10-18 RX ORDER — SACCHAROMYCES BOULARDII 250 MG
250 CAPSULE ORAL 2 TIMES DAILY
Status: DISCONTINUED | OUTPATIENT
Start: 2024-10-18 | End: 2024-10-19 | Stop reason: HOSPADM

## 2024-10-18 RX ADMIN — CEFTRIAXONE SODIUM 1000 MG: 10 INJECTION, POWDER, FOR SOLUTION INTRAVENOUS at 19:37

## 2024-10-18 RX ADMIN — CARBAMAZEPINE 200 MG: 200 TABLET ORAL at 08:42

## 2024-10-18 RX ADMIN — CARBAMAZEPINE 200 MG: 200 TABLET ORAL at 18:06

## 2024-10-18 RX ADMIN — ENOXAPARIN SODIUM 40 MG: 40 INJECTION SUBCUTANEOUS at 08:40

## 2024-10-18 RX ADMIN — VITAM B12 100 MCG: 100 TAB at 08:41

## 2024-10-18 RX ADMIN — Medication 2000 UNITS: at 08:41

## 2024-10-18 RX ADMIN — ASPIRIN 81 MG: 81 TABLET, COATED ORAL at 08:41

## 2024-10-18 RX ADMIN — Medication 250 MG: at 18:06

## 2024-10-18 RX ADMIN — Medication 250 MG: at 11:33

## 2024-10-18 NOTE — PROGRESS NOTES
Progress Note - Hospitalist   Name: Francesca Retana 78 y.o. female I MRN: [id number]  Unit/Bed#: -01 I Date of Admission: 10/15/2024   Date of Service: 10/18/2024 I Hospital Day: 3    Assessment & Plan  Sepsis (HCC)  Patient originally presented to the ED with complaints of right flank pain with dysuria  Secondary to right pyelonephritis, seen on CT imaging.  Sepsis evolving since admission, initially had tachycardia but no other criteria, this morning patient is still tachycardic with leukocytosis.  Lactic acid level 2.3 on admission, resolved with IVF 1.1  UA positive with Nitrites/Leukocytes, with innumerable WBC/Bacteria  Urine culture + klebsiella pneumoniae, proteus  Blood cultures + gram - rods, BCID proteus  ID Consult, appreciate ongoing recommendations  Continue with 1 g IV Ceftriaxone daily  Anticipate 10 day treatment  Pyelonephritis of right kidney  Present on admission  CT Renal Stone (10/15/24): 1.  Mild right hydroureteronephrosis, with diffuse right perinephric and periureteral stranding. There is also diffuse urinary bladder wall thickening and surrounding stranding. This suggests a cystitis and findings concerning for right pyelonephritis. 2.  Colonic diverticulosis. 3.  Punctate nonobstructing left renal calculus.  Patient does have history of recurrent UTIs, follows with Urology  Takes Hiprex/Estrace at home typically.  Continue with IV Ceftriaxone for now  ID following  Urology consulted as well  Left renal mass  Known left renal cyst  Follows with Urology  Patient gets serial monitoring US yearly.  Multiple sclerosis (HCC)  Chronic history  Follows with [facility] - Neurology  Trigeminal neuralgia  Follows with [facility] - Neurology  Continue Carbamazepine  Bacteremia due to Proteus species  See plan as noted above  Neurogenic bladder  Secondary to multiple sclerosis  Will monitor PVR bladder scan    VTE Pharmacologic Prophylaxis:    Enoxaparin    Mobility:   Basic Mobility Inpatient Raw Score:  21  JH-HLM Goal: 6: Walk 10 steps or more  JH-HLM Achieved: 6: Walk 10 steps or more  JH-HLM Goal achieved. Continue to encourage appropriate mobility.    Patient Centered Rounds: I performed bedside rounds with nursing staff today.   Discussions with Specialists or Other Care Team Provider: Case Management, ID    Education and Discussions with Family / Patient: Patient declined call to .     Current Length of Stay: 3 day(s)  Current Patient Status: Inpatient   Certification Statement: The patient will continue to require additional inpatient hospital stay due to ongoing treatment in setting of finalizing abx plan.  Discharge Plan: Anticipate discharge tomorrow to home.    Code Status: Level 1 - Full Code    Subjective   Patient feels well.  Denies complaints of pain, shortness of breath, fever or chills.    Objective :  Temp:  [97.6 °F (36.4 °C)-98.2 °F (36.8 °C)] 98.2 °F (36.8 °C)  HR:  [87-92] 92  BP: (124-147)/(69-73) 147/73  Resp:  [16] 16  SpO2:  [95 %-97 %] 97 %  O2 Device: None (Room air)    Body mass index is 20.76 kg/m².     Input and Output Summary (last 24 hours):     Intake/Output Summary (Last 24 hours) at 10/18/2024 1153  Last data filed at 10/18/2024 0354  Gross per 24 hour   Intake 235 ml   Output 303 ml   Net -68 ml       Physical Exam  Vitals and nursing note reviewed.   Constitutional:       General: She is not in acute distress.     Appearance: She is normal weight. She is not ill-appearing.   Cardiovascular:      Rate and Rhythm: Normal rate.      Pulses: Normal pulses.   Pulmonary:      Effort: Pulmonary effort is normal.   Abdominal:      General: Bowel sounds are normal. There is no distension.      Palpations: Abdomen is soft.      Tenderness: There is no abdominal tenderness.   Musculoskeletal:         General: Normal range of motion.      Right lower leg: No edema.      Left lower leg: No edema.   Skin:     General: Skin is warm and dry.   Neurological:      Mental Status:  She is alert and oriented to person, place, and time.   Psychiatric:         Mood and Affect: Mood normal.           Lines/Drains:              Lab Results: I have reviewed the following results:   Results from last 7 days   Lab Units 10/18/24  0447 10/16/24  0447 10/15/24  1814   WBC Thousand/uL 7.45   < > 7.54   HEMOGLOBIN g/dL 11.0*   < > 11.7   HEMATOCRIT % 34.9   < > 37.0   PLATELETS Thousands/uL 153   < > 201   BANDS PCT %  --   --  1   SEGS PCT % 76*  --   --    LYMPHO PCT % 11*  --  5*   MONO PCT % 9  --  2*   EOS PCT % 2  --  0    < > = values in this interval not displayed.     Results from last 7 days   Lab Units 10/18/24  0447   SODIUM mmol/L 140   POTASSIUM mmol/L 3.5   CHLORIDE mmol/L 105   CO2 mmol/L 28   BUN mg/dL 20   CREATININE mg/dL 0.71   ANION GAP mmol/L 7   CALCIUM mg/dL 8.6   GLUCOSE RANDOM mg/dL 87                 Results from last 7 days   Lab Units 10/16/24  0002 10/15/24  1943   LACTIC ACID mmol/L 1.1 2.3*       Recent Cultures (last 7 days):   Results from last 7 days   Lab Units 10/15/24  1945 10/15/24  1943/24  1837   BLOOD CULTURE  Proteus mirabilis* Proteus mirabilis*  --    GRAM STAIN RESULT  Gram negative rods* Gram negative rods*  --    URINE CULTURE   --   --  >100,000 cfu/ml Klebsiella pneumoniae*  Proteus mirabilis*       Imaging Results Review: No pertinent imaging studies reviewed.  Other Study Results Review: No additional pertinent studies reviewed.    Last 24 Hours Medication List:     Current Facility-Administered Medications:     acetaminophen (TYLENOL) tablet 650 mg, Q6H PRN    aluminum-magnesium hydroxide-simethicone (MAALOX) oral suspension 30 mL, Q4H PRN    aspirin (ECOTRIN LOW STRENGTH) EC tablet 81 mg, Daily    carBAMazepine (TEGretol) tablet 200 mg, BID    ceftriaxone (ROCEPHIN) 1 g/50 mL in dextrose IVPB, Q24H, Last Rate: 1,000 mg (10/17/24 2049)    Cholecalciferol (VITAMIN D3) tablet 2,000 Units, Daily    cyanocobalamin (VITAMIN B-12) tablet 100 mcg,  Daily    enoxaparin (LOVENOX) subcutaneous injection 40 mg, Daily    ondansetron (ZOFRAN) injection 4 mg, Q8H PRN    saccharomyces boulardii (FLORASTOR) capsule 250 mg, BID    Administrative Statements   Today, Patient Was Seen By: MARYSE Spann  I have spent a total time of 39 minutes in caring for this patient on the day of the visit/encounter including Risks and benefits of tx options, Instructions for management, Patient and family education, Importance of tx compliance, Risk factor reductions, Impressions, Counseling / Coordination of care, Documenting in the medical record, Reviewing / ordering tests, medicine, procedures  , and Communicating with other healthcare professionals .    **Please Note: This note may have been constructed using a voice recognition system.**

## 2024-10-18 NOTE — ASSESSMENT & PLAN NOTE
Tachycardia with leukocytosis.  This is due to #2 and #3.  Patient is otherwise hemodynamically stable.  White blood cell count and clinical parameters improving.  Continue antibiotics as below  Follow-up pending blood cultures for final regimen  Repeat CBC/chemistry tomorrow to monitor treatment response/dosing  Monitor for urinary retention, retention protocol ordered  Additional supportive cares per primary  Anticipate total 10 days of antibiotic (IV+PO) for #3 through 10/24  No anticipated follow-up in the ID office

## 2024-10-18 NOTE — PLAN OF CARE
Problem: INFECTION - ADULT  Goal: Absence or prevention of progression during hospitalization  Description: INTERVENTIONS:  - Assess and monitor for signs and symptoms of infection  - Monitor lab/diagnostic results  - Monitor all insertion sites, i.e. indwelling lines, tubes, and drains  - Monitor endotracheal if appropriate and nasal secretions for changes in amount and color  - [place] appropriate cooling/warming therapies per order  - Administer medications as ordered  - Instruct and encourage patient and family to use good hand hygiene technique  - Identify and instruct in appropriate isolation precautions for identified infection/condition  Outcome: Progressing     Problem: INFECTION - ADULT  Goal: Absence of fever/infection during neutropenic period  Description: INTERVENTIONS:  - Monitor WBC    Outcome: Progressing     Problem: SAFETY ADULT  Goal: Patient will remain free of falls  Description: INTERVENTIONS:  - Educate patient/family on patient safety including physical limitations  - Instruct patient to call for assistance with activity   - Consult OT/PT to assist with strengthening/mobility   - Keep Call bell within reach  - Keep bed low and locked with side rails adjusted as appropriate  - Keep care items and personal belongings within reach  - Initiate and maintain comfort rounds  - Make Fall Risk Sign visible to staff  - Offer Toileting every 2 Hours, in advance of need  - Initiate/Maintain bed alarm  - Obtain necessary fall risk management equipment:   - Apply yellow socks and bracelet for high fall risk patients  - Consider moving patient to room near nurses station  Outcome: Progressing

## 2024-10-18 NOTE — PROGRESS NOTES
Progress Note - Infectious Disease   Name: Francesca Retana 78 y.o. female I MRN: [id number]  Unit/Bed#: -01 I Date of Admission: 10/15/2024   Date of Service: 10/18/2024 I Hospital Day: 3     Assessment & Plan  Sepsis (HCC)  Tachycardia with leukocytosis.  This is due to #2 and #3.  Patient is otherwise hemodynamically stable.  White blood cell count and clinical parameters improving.  Continue antibiotics as below  Follow-up pending blood cultures for final regimen  Repeat CBC/chemistry tomorrow to monitor treatment response/dosing  Monitor for urinary retention, retention protocol ordered  Additional supportive cares per primary  Anticipate total 10 days of antibiotic (IV+PO) for #3 through 10/24  No anticipated follow-up in the ID office  Bacteremia due to Proteus species  1 out of 2 blood cultures from admission have isolated gram-negative rods.  PCR with Proteus.  Prior culture data reviewed.  Sources #3.  No other obvious sources on exam or imaging.  Patient reporting symptomatic improvement.  Susceptibilities pending.  Urine cultures also with Proteus.  Klebsiella in the urine likely colonizer.  Continue ceftriaxone for now  We will order probiotic for patient with ongoing antibiotic  Follow-up pending blood cultures  Repeat CBC/chemistry tomorrow  Monitor for urinary retention.  Urology evaluation noted  Additional supportive cares per primary  Will adjust antibiotics further based on culture data  Anticipate 10 days of antibiotic for #3  Anticipate transition to oral therapy in next 24 to 48 hours.  Pyelonephritis of right kidney  Patient presented predominantly with right flank pain and nausea and vomiting.  CT imaging confirms ascending urinary tract changes.  Course complicated by the above.  Suspect that this is related to issues below and overall poor emptying.  Prior urine cultures reviewed.  Symptoms improved.  Continue antibiotic as above  Follow-up pending culture data  Trend fever  curve/WBC  Monitor for urinary retention  Follow-up with urology as outpatient  No plans for suppressive antibiotic as below  Anticipate 10 days of antibiotic for this issue  Recurrent urinary tract infection  Patient's urology notes reviewed and at baseline she has a neurogenic bladder and what sounds like chronic urinary leakage and poor emptying.  She is consistent with estrogen use but is not consistent with fluid intake or timed voiding.  I suspect that these issues are leading to her recurrent infections.  Last cystoscopy was in 2023.  Most recent CT from 10/9 with left renal changes but also irregular thickening of the bladder.  No plans for suppressive antibiotic given these mechanical issues  Continue current antibiotic course as above  Follow-up with urology as outpatient  Anticipate patient will need updated cystoscopy  May need to consider straight catheterization as outpatient  Will monitor for retention while admitted  Encourage fluid hydration  Consider regular bowel regimen  Additional care per primary  No anticipated need for follow-up in the ID office for this issue  Neurogenic bladder  This is a complication of patient's MS.  Course complicated by the above.  Will monitor for retention while admitted.  Follow-up with urology as outpatient.  Multiple sclerosis (HCC)  Patient is not on any disease modifying agents and has not been since she was 73.  Course complicated by the above.  Supportive care otherwise per primary.    Left renal mass  Recently diagnosed on imaging from 10/9.  Patient also with prior history of lung cancer requiring R pneumonectomy.  Tentative plans are for follow-up with urology  Continue antibiotics as above  Continue plans for follow-up with urology  Anticipate patient will need updated cystoscopy  Additional care per primary    Above plan discussed in detail with the patient at bedside  Above plan discussed in detail with primary service advance practitioner who is aware of  plans for following up final cultures to determine final regimen and duration of therapy    ID consult service will reevaluate this patient again over the weekend as needed.  Please contact ID attending on call with questions.    Antibiotics:  Ceftriaxone 4    24 Hour events:  Yesterday and overnight notes reviewed and no acute events noted    Subjective:  Patient currently denies having any nausea, vomiting, chest pain or shortness of breath.  Tolerating current antibiotics without itching or rash.  No recurrence of back discomfort or nausea or vomiting currently.    Objective:  Vitals:  Temp:  [97.6 °F (36.4 °C)-98.2 °F (36.8 °C)] 98.2 °F (36.8 °C)  HR:  [87-92] 92  Resp:  [16] 16  BP: (124-147)/(69-73) 147/73  SpO2:  [95 %-97 %] 97 %  Temp (24hrs), Av.9 °F (36.6 °C), Min:97.6 °F (36.4 °C), Max:98.2 °F (36.8 °C)  Current: Temperature: 98.2 °F (36.8 °C)    Physical Exam:   General Appearance:  Alert, interactive, nontoxic, no acute distress.   Throat: Oropharynx moist without lesions.    Lungs:   Clear to auscultation bilaterally; no wheezes, rhonchi or rales; respirations unlabored on room air   Heart:  RRR; no murmur, rub or gallop noted   Abdomen:   Soft, non-tender, non-distended, positive bowel sounds.     Extremities: No clubbing, cyanosis or edema   Skin: No new rashes or lesions. No new draining wounds noted.       Labs, Imaging, & Other studies:   All pertinent labs and imaging studies in PACS were personally reviewed as below.  Results from last 7 days   Lab Units 10/18/24  0447 10/17/24  0444 10/16/24  0447   WBC Thousand/uL 7.45 12.46* 21.20*   HEMOGLOBIN g/dL 11.0* 10.7* 10.1*   PLATELETS Thousands/uL 153 146* 156     Results from last 7 days   Lab Units 10/18/24  0447   POTASSIUM mmol/L 3.5   CHLORIDE mmol/L 105   CO2 mmol/L 28   BUN mg/dL 20   CREATININE mg/dL 0.71   EGFR ml/min/1.73sq m 80   CALCIUM mg/dL 8.6     Results from last 7 days   Lab Units 10/15/24  1945 10/15/24  1943/24  183    BLOOD CULTURE  Proteus mirabilis* Proteus mirabilis*  --    GRAM STAIN RESULT  Gram negative rods* Gram negative rods*  --    URINE CULTURE   --   --  >100,000 cfu/ml Klebsiella pneumoniae*  Proteus mirabilis*       Lab interpretation/comments: White blood cell count is normalized    Imaging interpretation/comments: No new imaging    Culture data: Culture susceptibilities pending    External notes: None

## 2024-10-18 NOTE — ASSESSMENT & PLAN NOTE
Patient's urology notes reviewed and at baseline she has a neurogenic bladder and what sounds like chronic urinary leakage and poor emptying.  She is consistent with estrogen use but is not consistent with fluid intake or timed voiding.  I suspect that these issues are leading to her recurrent infections.  Last cystoscopy was in 2023.  Most recent CT from 10/9 with left renal changes but also irregular thickening of the bladder.  No plans for suppressive antibiotic given these mechanical issues  Continue current antibiotic course as above  Follow-up with urology as outpatient  Anticipate patient will need updated cystoscopy  May need to consider straight catheterization as outpatient  Will monitor for retention while admitted  Encourage fluid hydration  Consider regular bowel regimen  Additional care per primary  No anticipated need for follow-up in the ID office for this issue

## 2024-10-18 NOTE — CASE MANAGEMENT
Case Management Discharge Planning Note    Patient name Francesca Retana  Location /-01 MRN [id number]  : 1945 Date 10/18/2024       Current Admission Date: 10/15/2024  Current Admission Diagnosis:Sepsis (HCC)   Patient Active Problem List    Diagnosis Date Noted Date Diagnosed    Sepsis (HCC) 10/16/2024     Bacteremia due to Proteus species 10/16/2024     Neurogenic bladder 10/16/2024     Recurrent urinary tract infection 10/16/2024     Pyelonephritis of right kidney 10/15/2024     Left renal mass 10/15/2024     Multiple sclerosis (HCC) 10/15/2024     Trigeminal neuralgia 10/15/2024     History of lung cancer 10/15/2024       LOS (days): 3  Geometric Mean LOS (GMLOS) (days): 3.8  Days to GMLOS:1     OBJECTIVE:  Risk of Unplanned Readmission Score: 10.98         Current admission status: Inpatient   Preferred Pharmacy:   [facility]/pharmacy #[id number] - [place], [place] - [address]  [address]  [facility] [address]  Phone: [phone] Fax: [phone]    Primary Care Provider: Jazz Tripp MD    Primary Insurance: [facility]  Secondary Insurance:     DISCHARGE DETAILS:    Discharge planning discussed with:: Patient at bedside  Freedom of Choice: Yes  Comments - Freedom of Choice: CM discussed freedom of choice as it pertains to discharge planning. CM reviewed choice list with pt and she is agreeable to [facility].  CM contacted family/caregiver?: Yes  Were Treatment Team discharge recommendations reviewed with patient/caregiver?: Yes  Did patient/caregiver verbalize understanding of patient care needs?: Yes  Were patient/caregiver advised of the risks associated with not following Treatment Team discharge recommendations?: Yes    Contacts  Patient Contacts: Lashanda Tracey (Daughter) [phone] (M)  Relationship to Patient:: Family  Contact Method: In Person  Reason/Outcome: Discharge Planning    Requested Home Health Care         Is the patient interested in C at  discharge?: Yes  Home Health Discipline requested:: Nursing, Occupational Therapy, Physical Therapy  Home Health Agency Name:: [facility]  Home Health Follow-Up Provider:: PCP  Home Health Services Needed:: Evaluate Functional Status and Safety, Strengthening/Theraputic Exercises to Improve Function, Gait/ADL Training  Homebound Criteria Met:: Requires the Assistance of Another Person for Safe Ambulation or to Leave the Home, Uses an Assist Device (i.e. cane, walker, etc)  Supporting Clincal Findings:: Limited Endurance, Fatigues Easliy in Short Distances    DME Referral Provided  Referral made for DME?: No    Other Referral/Resources/Interventions Provided:  Referral Comments: [facility]    Would you like to participate in our [facility] service program?  : No - Declined    Treatment Team Recommendation: Home with Home Health Care  Discharge Destination Plan:: Home with Home Health Care  Transport at Discharge : Family

## 2024-10-18 NOTE — ASSESSMENT & PLAN NOTE
Patient presented predominantly with right flank pain and nausea and vomiting.  CT imaging confirms ascending urinary tract changes.  Course complicated by the above.  Suspect that this is related to issues below and overall poor emptying.  Prior urine cultures reviewed.  Symptoms improved.  Continue antibiotic as above  Follow-up pending culture data  Trend fever curve/WBC  Monitor for urinary retention  Follow-up with urology as outpatient  No plans for suppressive antibiotic as below  Anticipate 10 days of antibiotic for this issue

## 2024-10-18 NOTE — PLAN OF CARE
Problem: PAIN - ADULT  Goal: Verbalizes/displays adequate comfort level or baseline comfort level  Description: Interventions:  - Encourage patient to monitor pain and request assistance  - Assess pain using appropriate pain scale  - Administer analgesics based on type and severity of pain and evaluate response  - Implement non-pharmacological measures as appropriate and evaluate response  - Consider cultural and social influences on pain and pain management  - Notify physician/advanced practitioner if interventions unsuccessful or patient reports new pain  Outcome: Progressing     Problem: INFECTION - ADULT  Goal: Absence or prevention of progression during hospitalization  Description: INTERVENTIONS:  - Assess and monitor for signs and symptoms of infection  - Monitor lab/diagnostic results  - Monitor all insertion sites, i.e. indwelling lines, tubes, and drains  - Monitor endotracheal if appropriate and nasal secretions for changes in amount and color  - Lemmon appropriate cooling/warming therapies per order  - Administer medications as ordered  - Instruct and encourage patient and family to use good hand hygiene technique  - Identify and instruct in appropriate isolation precautions for identified infection/condition  Outcome: Progressing  Goal: Absence of fever/infection during neutropenic period  Description: INTERVENTIONS:  - Monitor WBC    Outcome: Progressing     Problem: SAFETY ADULT  Goal: Patient will remain free of falls  Description: INTERVENTIONS:  - Educate patient/family on patient safety including physical limitations  - Instruct patient to call for assistance with activity   - Consult OT/PT to assist with strengthening/mobility   - Keep Call bell within reach  - Keep bed low and locked with side rails adjusted as appropriate  - Keep care items and personal belongings within reach  - Initiate and maintain comfort rounds  - Make Fall Risk Sign visible to staff  - Offer Toileting every 2 Hours,  in advance of need  - Initiate/Maintain bed alarm  - Obtain necessary fall risk management equipment: walker  - Apply yellow socks and bracelet for high fall risk patients  - Consider moving patient to room near nurses station  Outcome: Progressing  Goal: Maintain or return to baseline ADL function  Description: INTERVENTIONS:  -  Assess patient's ability to carry out ADLs; assess patient's baseline for ADL function and identify physical deficits which impact ability to perform ADLs (bathing, care of mouth/teeth, toileting, grooming, dressing, etc.)  - Assess/evaluate cause of self-care deficits   - Assess range of motion  - Assess patient's mobility; develop plan if impaired  - Assess patient's need for assistive devices and provide as appropriate  - Encourage maximum independence but intervene and supervise when necessary  - Involve family in performance of ADLs  - Assess for home care needs following discharge   - Consider OT consult to assist with ADL evaluation and planning for discharge  - Provide patient education as appropriate  Outcome: Progressing  Goal: Maintains/Returns to pre admission functional level  Description: INTERVENTIONS:  - Perform AM-PAC 6 Click Basic Mobility/ Daily Activity assessment daily.  - Set and communicate daily mobility goal to care team and patient/family/caregiver.   - Collaborate with rehabilitation services on mobility goals if consulted  - Perform Range of Motion 3 times a day.  - Reposition patient every 2 hours.  - Dangle patient 3 times a day  - Stand patient 3 times a day  - Ambulate patient 3 times a day  - Out of bed to chair 3 times a day   - Out of bed for meals 3 times a day  - Out of bed for toileting  - Record patient progress and toleration of activity level   Outcome: Progressing     Problem: DISCHARGE PLANNING  Goal: Discharge to home or other facility with appropriate resources  Description: INTERVENTIONS:  - Identify barriers to discharge w/patient and  caregiver  - Arrange for needed discharge resources and transportation as appropriate  - Identify discharge learning needs (meds, wound care, etc.)  - Arrange for interpretive services to assist at discharge as needed  - Refer to Case Management Department for coordinating discharge planning if the patient needs post-hospital services based on physician/advanced practitioner order or complex needs related to functional status, cognitive ability, or social support system  Outcome: Progressing     Problem: Knowledge Deficit  Goal: Patient/family/caregiver demonstrates understanding of disease process, treatment plan, medications, and discharge instructions  Description: Complete learning assessment and assess knowledge base.  Interventions:  - Provide teaching at level of understanding  - Provide teaching via preferred learning methods  Outcome: Progressing     Problem: Nutrition/Hydration-ADULT  Goal: Nutrient/Hydration intake appropriate for improving, restoring or maintaining nutritional needs  Description: Monitor and assess patient's nutrition/hydration status for malnutrition. Collaborate with interdisciplinary team and initiate plan and interventions as ordered.  Monitor patient's weight and dietary intake as ordered or per policy. Utilize nutrition screening tool and intervene as necessary. Determine patient's food preferences and provide high-protein, high-caloric foods as appropriate.     INTERVENTIONS:  - Monitor oral intake, urinary output, labs, and treatment plans  - Assess nutrition and hydration status and recommend course of action  - Evaluate amount of meals eaten  - Assist patient with eating if necessary   - Allow adequate time for meals  - Recommend/ encourage appropriate diets, oral nutritional supplements, and vitamin/mineral supplements  - Order, calculate, and assess calorie counts as needed  - Recommend, monitor, and adjust tube feedings and TPN/PPN based on assessed needs  - Assess need for  intravenous fluids  - Provide specific nutrition/hydration education as appropriate  - Include patient/family/caregiver in decisions related to nutrition  Outcome: Progressing

## 2024-10-18 NOTE — ASSESSMENT & PLAN NOTE
1 out of 2 blood cultures from admission have isolated gram-negative rods.  PCR with Proteus.  Prior culture data reviewed.  Sources #3.  No other obvious sources on exam or imaging.  Patient reporting symptomatic improvement.  Susceptibilities pending.  Urine cultures also with Proteus.  Klebsiella in the urine likely colonizer.  Continue ceftriaxone for now  We will order probiotic for patient with ongoing antibiotic  Follow-up pending blood cultures  Repeat CBC/chemistry tomorrow  Monitor for urinary retention.  Urology evaluation noted  Additional supportive cares per primary  Will adjust antibiotics further based on culture data  Anticipate 10 days of antibiotic for #3  Anticipate transition to oral therapy in next 24 to 48 hours.

## 2024-10-19 ENCOUNTER — HOME HEALTH ADMISSION (OUTPATIENT)
Dept: HOME HEALTH SERVICES | Facility: HOME HEALTHCARE | Age: 79
End: 2024-10-19
Payer: COMMERCIAL

## 2024-10-19 VITALS
BODY MASS INDEX: 20.79 KG/M2 | DIASTOLIC BLOOD PRESSURE: 76 MMHG | OXYGEN SATURATION: 93 % | HEART RATE: 95 BPM | SYSTOLIC BLOOD PRESSURE: 139 MMHG | RESPIRATION RATE: 16 BRPM | TEMPERATURE: 98.3 F | WEIGHT: 124.78 LBS | HEIGHT: 65 IN

## 2024-10-19 LAB
ANION GAP SERPL CALCULATED.3IONS-SCNC: 5 MMOL/L (ref 4–13)
BACTERIA BLD CULT: ABNORMAL
BASOPHILS # BLD AUTO: 0.03 THOUSANDS/ΜL (ref 0–0.1)
BASOPHILS NFR BLD AUTO: 1 % (ref 0–1)
BUN SERPL-MCNC: 17 MG/DL (ref 5–25)
CALCIUM SERPL-MCNC: 8.2 MG/DL (ref 8.4–10.2)
CHLORIDE SERPL-SCNC: 103 MMOL/L (ref 96–108)
CO2 SERPL-SCNC: 29 MMOL/L (ref 21–32)
CREAT SERPL-MCNC: 0.64 MG/DL (ref 0.6–1.3)
EOSINOPHIL # BLD AUTO: 0.14 THOUSAND/ΜL (ref 0–0.61)
EOSINOPHIL NFR BLD AUTO: 3 % (ref 0–6)
ERYTHROCYTE [DISTWIDTH] IN BLOOD BY AUTOMATED COUNT: 13.7 % (ref 11.6–15.1)
GFR SERPL CREATININE-BSD FRML MDRD: 84 ML/MIN/1.73SQ M
GLUCOSE SERPL-MCNC: 92 MG/DL (ref 65–140)
GRAM STN SPEC: ABNORMAL
HCT VFR BLD AUTO: 33.7 % (ref 34.8–46.1)
HGB BLD-MCNC: 10.5 G/DL (ref 11.5–15.4)
IMM GRANULOCYTES # BLD AUTO: 0.02 THOUSAND/UL (ref 0–0.2)
IMM GRANULOCYTES NFR BLD AUTO: 0 % (ref 0–2)
LYMPHOCYTES # BLD AUTO: 1.07 THOUSANDS/ΜL (ref 0.6–4.47)
LYMPHOCYTES NFR BLD AUTO: 19 % (ref 14–44)
MCH RBC QN AUTO: 30.1 PG (ref 26.8–34.3)
MCHC RBC AUTO-ENTMCNC: 31.2 G/DL (ref 31.4–37.4)
MCV RBC AUTO: 97 FL (ref 82–98)
MONOCYTES # BLD AUTO: 0.6 THOUSAND/ΜL (ref 0.17–1.22)
MONOCYTES NFR BLD AUTO: 11 % (ref 4–12)
NEUTROPHILS # BLD AUTO: 3.72 THOUSANDS/ΜL (ref 1.85–7.62)
NEUTS SEG NFR BLD AUTO: 66 % (ref 43–75)
NRBC BLD AUTO-RTO: 0 /100 WBCS
PLATELET # BLD AUTO: 165 THOUSANDS/UL (ref 149–390)
PMV BLD AUTO: 11.1 FL (ref 8.9–12.7)
POTASSIUM SERPL-SCNC: 3.3 MMOL/L (ref 3.5–5.3)
PROTEUS SP DNA BLD POS QL NAA+NON-PROBE: DETECTED
RBC # BLD AUTO: 3.49 MILLION/UL (ref 3.81–5.12)
SODIUM SERPL-SCNC: 137 MMOL/L (ref 135–147)
WBC # BLD AUTO: 5.58 THOUSAND/UL (ref 4.31–10.16)

## 2024-10-19 PROCEDURE — 85025 COMPLETE CBC W/AUTO DIFF WBC: CPT | Performed by: INTERNAL MEDICINE

## 2024-10-19 PROCEDURE — 80048 BASIC METABOLIC PNL TOTAL CA: CPT | Performed by: INTERNAL MEDICINE

## 2024-10-19 PROCEDURE — 99239 HOSP IP/OBS DSCHRG MGMT >30: CPT | Performed by: NURSE PRACTITIONER

## 2024-10-19 RX ORDER — SACCHAROMYCES BOULARDII 250 MG
250 CAPSULE ORAL 2 TIMES DAILY
Qty: 28 CAPSULE | Refills: 0 | Status: SHIPPED | OUTPATIENT
Start: 2024-10-19 | End: 2024-11-02

## 2024-10-19 RX ORDER — CEPHALEXIN 500 MG/1
500 CAPSULE ORAL EVERY 6 HOURS SCHEDULED
Qty: 24 CAPSULE | Refills: 0 | Status: SHIPPED | OUTPATIENT
Start: 2024-10-19 | End: 2024-10-25

## 2024-10-19 RX ORDER — METHENAMINE HIPPURATE 1000 MG/1
1 TABLET ORAL 2 TIMES DAILY WITH MEALS
Qty: 180 TABLET | Refills: 0 | Status: SHIPPED | OUTPATIENT
Start: 2024-10-19 | End: 2025-04-17

## 2024-10-19 RX ORDER — CEPHALEXIN 500 MG/1
500 CAPSULE ORAL EVERY 6 HOURS SCHEDULED
Status: DISCONTINUED | OUTPATIENT
Start: 2024-10-19 | End: 2024-10-19 | Stop reason: HOSPADM

## 2024-10-19 RX ADMIN — VITAM B12 100 MCG: 100 TAB at 08:32

## 2024-10-19 RX ADMIN — CEPHALEXIN 500 MG: 500 CAPSULE ORAL at 17:13

## 2024-10-19 RX ADMIN — ENOXAPARIN SODIUM 40 MG: 40 INJECTION SUBCUTANEOUS at 08:31

## 2024-10-19 RX ADMIN — CARBAMAZEPINE 200 MG: 200 TABLET ORAL at 08:33

## 2024-10-19 RX ADMIN — Medication 2000 UNITS: at 08:31

## 2024-10-19 RX ADMIN — CEPHALEXIN 500 MG: 500 CAPSULE ORAL at 12:38

## 2024-10-19 RX ADMIN — CARBAMAZEPINE 200 MG: 200 TABLET ORAL at 17:14

## 2024-10-19 RX ADMIN — Medication 250 MG: at 08:32

## 2024-10-19 RX ADMIN — Medication 250 MG: at 17:13

## 2024-10-19 RX ADMIN — ASPIRIN 81 MG: 81 TABLET, COATED ORAL at 08:32

## 2024-10-19 NOTE — ASSESSMENT & PLAN NOTE
Present on admission  CT Renal Stone (10/15/24): 1.  Mild right hydroureteronephrosis, with diffuse right perinephric and periureteral stranding. There is also diffuse urinary bladder wall thickening and surrounding stranding. This suggests a cystitis and findings concerning for right pyelonephritis. 2.  Colonic diverticulosis. 3.  Punctate nonobstructing left renal calculus.  Patient does have history of recurrent UTIs, follows with Urology  Takes Hiprex/Estrace at home typically.  Hold Hiprex until cleared to restart by Urology  Follow up with Urology appt on Monday.

## 2024-10-19 NOTE — PLAN OF CARE
Problem: PAIN - ADULT  Goal: Verbalizes/displays adequate comfort level or baseline comfort level  Description: Interventions:  - Encourage patient to monitor pain and request assistance  - Assess pain using appropriate pain scale  - Administer analgesics based on type and severity of pain and evaluate response  - Implement non-pharmacological measures as appropriate and evaluate response  - Consider cultural and social influences on pain and pain management  - Notify physician/advanced practitioner if interventions unsuccessful or patient reports new pain  10/19/2024 1644 by Patrizia Vargas  Outcome: Adequate for Discharge  10/19/2024 1643 by Patrizia Vargas  Outcome: Adequate for Discharge  10/19/2024 1642 by Patrizia Vargas  Outcome: Adequate for Discharge  10/19/2024 1038 by Patrizia Vargas  Outcome: Progressing     Problem: INFECTION - ADULT  Goal: Absence or prevention of progression during hospitalization  Description: INTERVENTIONS:  - Assess and monitor for signs and symptoms of infection  - Monitor lab/diagnostic results  - Monitor all insertion sites, i.e. indwelling lines, tubes, and drains  - Monitor endotracheal if appropriate and nasal secretions for changes in amount and color  - [place] appropriate cooling/warming therapies per order  - Administer medications as ordered  - Instruct and encourage patient and family to use good hand hygiene technique  - Identify and instruct in appropriate isolation precautions for identified infection/condition  10/19/2024 1644 by Patrizia Vargas  Outcome: Adequate for Discharge  10/19/2024 1643 by Patrizia Vargas  Outcome: Adequate for Discharge  10/19/2024 1642 by Patrizia Vargas  Outcome: Adequate for Discharge  10/19/2024 1038 by Patrizia Vargas  Outcome: Progressing  Goal: Absence of fever/infection during neutropenic period  Description: INTERVENTIONS:  - Monitor WBC    10/19/2024 1644 by Patrizia Vargas  Outcome: Adequate for Discharge  10/19/2024 1643 by Patrizia  Sam  Outcome: Adequate for Discharge  10/19/2024 1642 by Patrizia Vargas  Outcome: Adequate for Discharge  10/19/2024 1038 by Patrizia Vargas  Outcome: Progressing     Problem: SAFETY ADULT  Goal: Patient will remain free of falls  Description: INTERVENTIONS:  - Educate patient/family on patient safety including physical limitations  - Instruct patient to call for assistance with activity   - Consult OT/PT to assist with strengthening/mobility   - Keep Call bell within reach  - Keep bed low and locked with side rails adjusted as appropriate  - Keep care items and personal belongings within reach  - Initiate and maintain comfort rounds  - Make Fall Risk Sign visible to staff  - Offer Toileting every 2 Hours, in advance of need  - Initiate/Maintain bed alarm  - Obtain necessary fall risk management equipment:   - Apply yellow socks and bracelet for high fall risk patients  - Consider moving patient to room near nurses station  10/19/2024 1644 by Patrizia Vargas  Outcome: Adequate for Discharge  10/19/2024 1643 by Patrizia Vargas  Outcome: Adequate for Discharge  10/19/2024 1642 by Patrizia Vargas  Outcome: Adequate for Discharge  10/19/2024 1038 by Patrizia Vargas  Outcome: Progressing  Goal: Maintain or return to baseline ADL function  Description: INTERVENTIONS:  -  Assess patient's ability to carry out ADLs; assess patient's baseline for ADL function and identify physical deficits which impact ability to perform ADLs (bathing, care of mouth/teeth, toileting, grooming, dressing, etc.)  - Assess/evaluate cause of self-care deficits   - Assess range of motion  - Assess patient's mobility; develop plan if impaired  - Assess patient's need for assistive devices and provide as appropriate  - Encourage maximum independence but intervene and supervise when necessary  - Involve family in performance of ADLs  - Assess for home care needs following discharge   - Consider OT consult to assist with ADL evaluation and planning for  discharge  - Provide patient education as appropriate  10/19/2024 1644 by Patrizia Vargas  Outcome: Adequate for Discharge  10/19/2024 1643 by Patrizia Vargas  Outcome: Adequate for Discharge  10/19/2024 1642 by Patrizia Vargas  Outcome: Adequate for Discharge  10/19/2024 1038 by Patrizia Vargas  Outcome: Progressing  Goal: Maintains/Returns to pre admission functional level  De- Out of bed for toileting  - Record patient progress and toleration of activity level   10/19/2024 1644 by Patrizia Vargas  Outcome: Adequate for Discharge  10/19/2024 1643 by Patrizia Vargas  Outcome: Adequate for Discharge  10/19/2024 1642 by Patrizia Vargas  Outcome: Adequate for Discharge  10/19/2024 1038 by Patrizia Vargas  Outcome: Progressing     Problem: DISCHARGE PLANNING  Goal: Discharge to home or other facility with appropriate resources  Description: INTERVENTIONS:  - Identify barriers to discharge w/patient and caregiver  - Arrange for needed discharge resources and transportation as appropriate  - Identify discharge learning needs (meds, wound care, etc.)  - Arrange for interpretive services to assist at discharge as needed  - Refer to Case Management Department for coordinating discharge planning if the patient needs post-hospital services based on physician/advanced practitioner order or complex needs related to functional status, cognitive ability, or social support system  10/19/2024 1644 by Patrizia Vargas  Outcome: Adequate for Discharge  10/19/2024 1643 by Patrizia Vargas  Outcome: Adequate for Discharge  10/19/2024 1642 by Patrizia Vargas  Outcome: Adequate for Discharge  10/19/2024 1038 by Patrizia Vargas  Outcome: Progressing     Problem: Knowledge Deficit  Goal: Patient/family/caregiver demonstrates understanding of disease process, treatment plan, medications, and discharge instructions  Description: Complete learning assessment and assess knowledge base.  Interventions:  - Provide teaching at level of understanding  - Provide  teaching via preferred learning methods  10/19/2024 1644 by Patrizia Vargas  Outcome: Adequate for Discharge  10/19/2024 1643 by Patrizia Vargas  Outcome: Adequate for Discharge  10/19/2024 1642 by Patrizia Vargas  Outcome: Adequate for Discharge  10/19/2024 1038 by Patrizia Vargas  Outcome: Progressing     Problem: Nutrition/Hydration-ADULT  Goal: Nutrient/Hydration intake appropriate for improving, restoring or maintaining nutritional needs  Description: Monitor and assess patient's nutrition/hydration status for malnutrition. Collaborate with interdisciplinary team and initiate plan and interventions as ordered.  Monitor patient's weight and dietary intake as ordered or per policy. Utilize nutrition screening tool and intervene as necessary. Determine patient's food preferences and provide high-protein, high-caloric foods as appropriate.     INTERVENTIONS:  - Monitor oral intake, urinary output, labs, and treatment plans  - Assess nutrition and hydration status and recommend course of action  - Evaluate amount of meals eaten  - Assist patient with eating if necessary   - Allow adequate time for meals  - Recommend/ encourage appropriate diets, oral nutritional supplements, and vitamin/mineral supplements  - Order, calculate, and assess calorie counts as needed  - Recommend, monitor, and adjust tube feedings and TPN/PPN based on assessed needs  - Assess need for intravenous fluids  - Provide specific nutrition/hydration education as appropriate  - Include patient/family/caregiver in decisions related to nutrition  10/19/2024 1644 by Patrizia Vargas  Outcome: Adequate for Discharge  10/19/2024 1643 by Patrizia Vargas  Outcome: Adequate for Discharge  10/19/2024 1642 by Patrizia Vargas  Outcome: Adequate for Discharge  10/19/2024 1038 by Patrizia Vargas  Outcome: Progressing

## 2024-10-19 NOTE — PLAN OF CARE
Problem: PAIN - ADULT  Goal: Verbalizes/displays adequate comfort level or baseline comfort level  Description: Interventions:  - Encourage patient to monitor pain and request assistance  - Assess pain using appropriate pain scale  - Administer analgesics based on type and severity of pain and evaluate response  - Implement non-pharmacological measures as appropriate and evaluate response  - Consider cultural and social influences on pain and pain management  - Notify physician/advanced practitioner if interventions unsuccessful or patient reports new pain  Outcome: Progressing     Problem: PAIN - ADULT  Goal: Verbalizes/displays adequate comfort level or baseline comfort level  Description: Interventions:  - Encourage patient to monitor pain and request assistance  - Assess pain using appropriate pain scale  - Administer analgesics based on type and severity of pain and evaluate response  - Implement non-pharmacological measures as appropriate and evaluate response  - Consider cultural and social influences on pain and pain management  - Notify physician/advanced practitioner if interventions unsuccessful or patient reports new pain  Outcome: Progressing      Currently on trelegy and singulair. Initially concerned that uncontrolled Crohn's disease was contributing to lack of asthma control. 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  Pt has great response to prednisone with AE of swelling in her face. Pt had previously been on it for 4 months with significant improvement in symptoms with relapse after stopping. Much improvement with 40 mg prednisone. Had significant symptoms with worsening on 20 mg prednisone, currently on 30 mg.   - will continue trelegy, singulair and PRN duonebs/combivent.   - Plan to start on Nucala.   - titrated to 20 mg prednisone with worsening symptoms. Increased back to 30 mg prednisone. Will titrate once started on Nucala.  Continue Bactrim while on high doses of prednisone.

## 2024-10-19 NOTE — ASSESSMENT & PLAN NOTE
Patient originally presented to the ED with complaints of right flank pain with dysuria  Secondary to right pyelonephritis, seen on CT imaging.  Sepsis evolving since admission, initially had tachycardia but no other criteria, this morning patient is still tachycardic with leukocytosis.  Lactic acid level 2.3 on admission, resolved with IVF 1.1  UA positive with Nitrites/Leukocytes, with innumerable WBC/Bacteria  Urine culture + klebsiella pneumoniae, proteus  Blood cultures + proteus  ID Consult, appreciate ongoing recommendations  Transition to PO Keflex x 10 days in total.

## 2024-10-19 NOTE — PLAN OF CARE
Problem: PAIN - ADULT  Goal: Verbalizes/displays adequate comfort level or baseline comfort level  Description: Interventions:  - Encourage patient to monitor pain and request assistance  - Assess pain using appropriate pain scale  - Administer analgesics based on type and severity of pain and evaluate response  - Implement non-pharmacological measures as appropriate and evaluate response  - Consider cultural and social influences on pain and pain management  - Notify physician/advanced practitioner if interventions unsuccessful or patient reports new pain  10/19/2024 1643 by Patrizia Vargas  Outcome: Adequate for Discharge  10/19/2024 1642 by Patrizia Vargas  Outcome: Adequate for Discharge  10/19/2024 1038 by Patrizia Vargas  Outcome: Progressing     Problem: INFECTION - ADULT  Goal: Absence or prevention of progression during hospitalization  Description: INTERVENTIONS:  - Assess and monitor for signs and symptoms of infection  - Monitor lab/diagnostic results  - Monitor all insertion sites, i.e. indwelling lines, tubes, and drains  - Monitor endotracheal if appropriate and nasal secretions for changes in amount and color  - [place] appropriate cooling/warming therapies per order  - Administer medications as ordered  - Instruct and encourage patient and family to use good hand hygiene technique  - Identify and instruct in appropriate isolation precautions for identified infection/condition  10/19/2024 1643 by Patrizia Vargas  Outcome: Adequate for Discharge  10/19/2024 1642 by Patrizia Vargas  Outcome: Adequate for Discharge  10/19/2024 1038 by Patrizia Vargas  Outcome: Progressing  Goal: Absence of fever/infection during neutropenic period  Description: INTERVENTIONS:  - Monitor WBC    10/19/2024 1643 by Patrizia Vargas  Outcome: Adequate for Discharge  10/19/2024 1642 by Patrizia Vargas  Outcome: Adequate for Discharge  10/19/2024 1038 by Patrizia Vargas  Outcome: Progressing     Problem: SAFETY ADULT  Goal: Patient  will remain free of falls  Description: INTERVENTIONS:  - Educate patient/family on patient safety including physical limitations  - Instruct patient to call for assistance with activity   - Consult OT/PT to assist with strengthening/mobility   - Keep Call bell within reach  - Keep bed low and locked with side rails adjusted as appropriate  - Keep care items and personal belongings within reach  - Initiate and maintain comfort rounds  - Make Fall Risk Sign visible to staff  - Offer Toileting every 2 Hours, in advance of need  - Initiate/Maintain bed alarm  - Obtain necessary fall risk management equipment:   - Apply yellow socks and bracelet for high fall risk patients  - Consider moving patient to room near nurses station  10/19/2024 1643 by Patrizia Vargas  Outcome: Adequate for Discharge  10/19/2024 1642 by Patrizia Vargas  Outcome: Adequate for Discharge  10/19/2024 1038 by Patrizia Vargas  Outcome: Progressing  Goal: Maintain or return to baseline ADL function  Description: INTERVENTIONS:  -  Assess patient's ability to carry out ADLs; assess patient's baseline for ADL function and identify physical deficits which impact ability to perform ADLs (bathing, care of mouth/teeth, toileting, grooming, dressing, etc.)  - Assess/evaluate cause of self-care deficits   - Assess range of motion  - Assess patient's mobility; develop plan if impaired  - Assess patient's need for assistive devices and provide as appropriate  - Encourage maximum independence but intervene and supervise when necessary  - Involve family in performance of ADLs  - Assess for home care needs following discharge   - Consider OT consult to assist with ADL evaluation and planning for discharge  - Provide patient education as appropriate  10/19/2024 1643 by Patrizia Vargas  Outcome: Adequate for Discharge  10/19/2024 1642 by Patrizia Vargas  Outcome: Adequate for Discharge  10/19/2024 1038 by Patrizia Vargas  Outcome: Progressing  G- Out of bed for  toileting  - Record patient progress and toleration of activity level   10/19/2024 1643 by Patrizia Vargas  Outcome: Adequate for Discharge  10/19/2024 1642 by Patrizia Vargas  Outcome: Adequate for Discharge  10/19/2024 1038 by Patrizia Vargas  Outcome: Progressing     Problem: DISCHARGE PLANNING  Goal: Discharge to home or other facility with appropriate resources  Description: INTERVENTIONS:  - Identify barriers to discharge w/patient and caregiver  - Arrange for needed discharge resources and transportation as appropriate  - Identify discharge learning needs (meds, wound care, etc.)  - Arrange for interpretive services to assist at discharge as needed  - Refer to Case Management Department for coordinating discharge planning if the patient needs post-hospital services based on physician/advanced practitioner order or complex needs related to functional status, cognitive ability, or social support system  10/19/2024 1643 by Patrizia Vargas  Outcome: Adequate for Discharge  10/19/2024 1642 by Patrizia Vargas  Outcome: Adequate for Discharge  10/19/2024 1038 by Patrizia Vargas  Outcome: Progressing     Problem: Knowledge Deficit  Goal: Patient/family/caregiver demonstrates understanding of disease process, treatment plan, medications, and discharge instructions  Description: Complete learning assessment and assess knowledge base.  Interventions:  - Provide teaching at level of understanding  - Provide teaching via preferred learning methods  10/19/2024 1643 by Patrizia Vargas  Outcome: Adequate for Discharge  10/19/2024 1642 by Patrizia Vargas  Outcome: Adequate for Discharge  10/19/2024 1038 by Patrizia Vargas  Outcome: Progressing     Problem: Nutrition/Hydration-ADULT  Goal: Nutrient/Hydration intake appropriate for improving, restoring or maintaining nutritional needs  Description: Monitor and assess patient's nutrition/hydration status for malnutrition. Collaborate with interdisciplinary team and initiate plan and  interventions as ordered.  Monitor patient's weight and dietary intake as ordered or per policy. Utilize nutrition screening tool and intervene as necessary. Determine patient's food preferences and provide high-protein, high-caloric foods as appropriate.     INTERVENTIONS:  - Monitor oral intake, urinary output, labs, and treatment plans  - Assess nutrition and hydration status and recommend course of action  - Evaluate amount of meals eaten  - Assist patient with eating if necessary   - Allow adequate time for meals  - Recommend/ encourage appropriate diets, oral nutritional supplements, and vitamin/mineral supplements  - Order, calculate, and assess calorie counts as needed  - Recommend, monitor, and adjust tube feedings and TPN/PPN based on assessed needs  - Assess need for intravenous fluids  - Provide specific nutrition/hydration education as appropriate  - Include patient/family/caregiver in decisions related to nutrition  10/19/2024 1643 by Patrizia Vargas  Outcome: Adequate for Discharge  10/19/2024 1642 by Patrizia Vargas  Outcome: Adequate for Discharge  10/19/2024 1038 by Patrizia Vargas  Outcome: Progressing

## 2024-10-19 NOTE — PLAN OF CARE
Problem: PAIN - ADULT  Goal: Verbalizes/displays adequate comfort level or baseline comfort level  Description: Interventions:  - Encourage patient to monitor pain and request assistance  - Assess pain using appropriate pain scale  - Administer analgesics based on type and severity of pain and evaluate response  - Implement non-pharmacological measures as appropriate and evaluate response  - Consider cultural and social influences on pain and pain management  - Notify physician/advanced practitioner if interventions unsuccessful or patient reports new pain  10/19/2024 1642 by Patrizia Vargas  Outcome: Adequate for Discharge  10/19/2024 1038 by Patrizia Vargas  Outcome: Progressing     Problem: INFECTION - ADULT  Goal: Absence or prevention of progression during hospitalization  Description: INTERVENTIONS:  - Assess and monitor for signs and symptoms of infection  - Monitor lab/diagnostic results  - Monitor all insertion sites, i.e. indwelling lines, tubes, and drains  - Monitor endotracheal if appropriate and nasal secretions for changes in amount and color  - [place] appropriate cooling/warming therapies per order  - Administer medications as ordered  - Instruct and encourage patient and family to use good hand hygiene technique  - Identify and instruct in appropriate isolation precautions for identified infection/condition  10/19/2024 1642 by Patrizia Vargas  Outcome: Adequate for Discharge  10/19/2024 1038 by Patrizia Vargas  Outcome: Progressing  Goal: Absence of fever/infection during neutropenic period  Description: INTERVENTIONS:  - Monitor WBC    10/19/2024 1642 by Patrizia Vargas  Outcome: Adequate for Discharge  10/19/2024 1038 by Patrizia Vargas  Outcome: Progressing     Problem: SAFETY ADULT  Goal: Patient will remain free of falls  Description: INTERVENTIONS:  - Educate patient/family on patient safety including physical limitations  - Instruct patient to call for assistance with activity   - Consult  OT/PT to assist with strengthening/mobility   - Keep Call bell within reach  - Keep bed low and locked with side rails adjusted as appropriate  - Keep care items and personal belongings within reach  - Initiate and maintain comfort rounds  - Make Fall Risk Sign visible to staff  - Offer Toileting every 2 Hours, in advance of need  - Initiate/Maintain bed alarm  - Obtain necessary fall risk management equipment:   - Apply yellow socks and bracelet for high fall risk patients  - Consider moving patient to room near nurses station  10/19/2024 1642 by Patrizia Vargas  Outcome: Adequate for Discharge  10/19/2024 1038 by Patrizia Vargas  Outcome: Progressing  Goal: Maintain or return to baseline ADL function  Description: INTERVENTIONS:  -  Assess patient's ability to carry out ADLs; assess patient's baseline for ADL function and identify physical deficits which impact ability to perform ADLs (bathing, care of mouth/teeth, toileting, grooming, dressing, etc.)  - Assess/evaluate cause of self-care deficits   - Assess range of motion  - Assess patient's mobility; develop plan if impaired  - Assess patient's need for assistive devices and provide as appropriate  - Encourage maximum independence but intervene and supervise when necessary  - Involve family in performance of ADLs  - Assess for home care needs following discharge   - Consider OT consult to assist with ADL evaluation and planning for discharge  - Provide patient education as appropriate  10/19/2024 1642 by Patrizia Vargas  Outcome: Adequate for Discharge  10/19/2024 1038 by Patrizia Vargas  Outcome: Progressing  Goal: Maintains/Returns to pre admission functional level  Description: INTERVENTIONS:  - Perform AM-PAC 6 Click Basic Mobility/ Daily Activity assessment daily.  - Set and communicate daily mobility goal to care team and patient/family/caregiver.   - Collaborate with rehabilitation services on mobility goals if consulted  - Perform Range of Motion 2 times a  day.  - Reposition patient every 2 hours.  - Dangle patient 2 times a day  - Stand patient 2 times a day  - Ambulate patient 2 times a day  - Out of bed to chair 2 times a day   - Out of bed for meals 2 times a day  - Out of bed for toileting  - Record patient progress and toleration of activity level   10/19/2024 1642 by Patrizia Vargas  Outcome: Adequate for Discharge  10/19/2024 1038 by Patrizia Vargas  Outcome: Progressing     Problem: DISCHARGE PLANNING  Goal: Discharge to home or other facility with appropriate resources  Description: INTERVENTIONS:  - Identify barriers to discharge w/patient and caregiver  - Arrange for needed discharge resources and transportation as appropriate  - Identify discharge learning needs (meds, wound care, etc.)  - Arrange for interpretive services to assist at discharge as needed  - Refer to Case Management Department for coordinating discharge planning if the patient needs post-hospital services based on physician/advanced practitioner order or complex needs related to functional status, cognitive ability, or social support system  10/19/2024 1642 by Patrizia Vargas  Outcome: Adequate for Discharge  10/19/2024 1038 by Patrizia Vargas  Outcome: Progressing     Problem: Knowledge Deficit  Goal: Patient/family/caregiver demonstrates understanding of disease process, treatment plan, medications, and discharge instructions  Description: Complete learning assessment and assess knowledge base.  Interventions:  - Provide teaching at level of understanding  - Provide teaching via preferred learning methods  10/19/2024 1642 by Patrizia Vargas  Outcome: Adequate for Discharge  10/19/2024 1038 by Patrizia Vargas  Outcome: Progressing     Problem: Nutrition/Hydration-ADULT  Goal: Nutrient/Hydration intake appropriate for improving, restoring or maintaining nutritional needs  Description: Monitor and assess patient's nutrition/hydration status for malnutrition. Collaborate with interdisciplinary  team and initiate plan and interventions as ordered.  Monitor patient's weight and dietary intake as ordered or per policy. Utilize nutrition screening tool and intervene as necessary. Determine patient's food preferences and provide high-protein, high-caloric foods as appropriate.     INTERVENTIONS:  - Monitor oral intake, urinary output, labs, and treatment plans  - Assess nutrition and hydration status and recommend course of action  - Evaluate amount of meals eaten  - Assist patient with eating if necessary   - Allow adequate time for meals  - Recommend/ encourage appropriate diets, oral nutritional supplements, and vitamin/mineral supplements  - Order, calculate, and assess calorie counts as needed  - Recommend, monitor, and adjust tube feedings and TPN/PPN based on assessed needs  - Assess need for intravenous fluids  - Provide specific nutrition/hydration education as appropriate  - Include patient/family/caregiver in decisions related to nutrition  10/19/2024 1642 by Patrizia Vargas  Outcome: Adequate for Discharge  10/19/2024 1038 by Patrizia Vargas  Outcome: Progressing

## 2024-10-19 NOTE — DISCHARGE SUMMARY
Discharge Summary - Hospitalist   Name: Francesca Retana 78 y.o. female I MRN: [id number]  Unit/Bed#: -01 I Date of Admission: 10/15/2024   Date of Service: 10/19/2024 I Hospital Day: 4     Assessment & Plan  Sepsis (HCC)  Patient originally presented to the ED with complaints of right flank pain with dysuria  Secondary to right pyelonephritis, seen on CT imaging.  Sepsis evolving since admission, initially had tachycardia but no other criteria, this morning patient is still tachycardic with leukocytosis.  Lactic acid level 2.3 on admission, resolved with IVF 1.1  UA positive with Nitrites/Leukocytes, with innumerable WBC/Bacteria  Urine culture + klebsiella pneumoniae, proteus  Blood cultures + proteus  ID Consult, appreciate ongoing recommendations  Transition to PO Keflex x 10 days in total.  Pyelonephritis of right kidney  Present on admission  CT Renal Stone (10/15/24): 1.  Mild right hydroureteronephrosis, with diffuse right perinephric and periureteral stranding. There is also diffuse urinary bladder wall thickening and surrounding stranding. This suggests a cystitis and findings concerning for right pyelonephritis. 2.  Colonic diverticulosis. 3.  Punctate nonobstructing left renal calculus.  Patient does have history of recurrent UTIs, follows with Urology  Takes Hiprex/Estrace at home typically.  Hold Hiprex until cleared to restart by Urology  Follow up with Urology appt on Monday.  Left renal mass  Known left renal cyst  Follows with Urology  Patient gets serial monitoring US yearly.  Multiple sclerosis (HCC)  Chronic history  Follows with [facility] - Neurology  Trigeminal neuralgia  Follows with [facility] - Neurology  Continue Carbamazepine  Bacteremia due to Proteus species  See plan as noted above  Neurogenic bladder  Secondary to multiple sclerosis    Recurrent urinary tract infection       Medical Problems       Resolved Problems  Date Reviewed: 10/16/2024   None       Discharging Physician /  Practitioner: MARYSE Spann  PCP: Jazz Tripp MD  Admission Date:   Admission Orders (From admission, onward)       Ordered        10/15/24 2032  INPATIENT ADMISSION  Once                          Discharge Date: 10/19/24    Consultations During Hospital Stay:  IP CONSULT TO UROLOGY  IP CONSULT TO INFECTIOUS DISEASES    Procedures Performed:   None    Significant Findings / Test Results:   CT renal stone study abdomen pelvis wo contrast   Final Result by Owen Dumont MD (10/15 2013)      1.  Mild right hydroureteronephrosis, with diffuse right perinephric and periureteral stranding. There is also diffuse urinary bladder wall thickening and surrounding stranding. This suggests a cystitis and findings concerning for right pyelonephritis.   2.  Colonic diverticulosis.   3.  Punctate nonobstructing left renal calculus.      Workstation performed: [id number]             Incidental Findings:   None     Test Results Pending at Discharge (will require follow up):   None     Outpatient Tests Requested:  Follow up with PCP within 1 wk    Complications:  None    Reason for Admission: Sepsis    Hospital Course:   Francesca Retana is a 78 y.o. female patient with past medical history of MS with neurogenic bladder, trigeminal neuralgia and lung cancer status post right total lung resection who originally presented to the hospital on 10/15/2024 due to urinary frequency several days ago with progressive symptoms including right flank pain and dysuria.    Patient with sepsis evolving since admission on day 2 of her admission as she had tachycardia with leukocytosis.  Initial lactic acid level was 2.3 on admission which did resolve with IV fluids to 1.1.  UA was positive with nitrates and leukocytes with innumerable white blood cells and bacteria.  Urine culture was positive for Klebsiella pneumonia and Proteus mirabilis.  Blood cultures also positive for Proteus mirabilis.    ID consult given recurrent  "UTIs.    Patient maintained on IV ceftriaxone until final sensitivities were completed, cleared to start on p.o. Keflex to complete 10 days of treatment through 10/24.    Please see above list of diagnoses and related plan for additional information.     Condition at Discharge: good    Discharge Day Visit / Exam:   Subjective:  I feel good today    Vitals: Blood Pressure: 139/76 (10/19/24 0659)  Pulse: 95 (10/19/24 0659)  Temperature: 98.3 °F (36.8 °C) (10/19/24 0659)  Temp Source: Oral (10/16/24 0733)  Respirations: 16 (10/17/24 7948)  Height: 5' 5\" (165.1 cm) (10/17/24 0900)  Weight - Scale: 56.6 kg (124 lb 12.5 oz) (10/17/24 0900)  SpO2: 93 % (10/19/24 0830)    Physical Exam  Vitals and nursing note reviewed.   Constitutional:       General: She is not in acute distress.     Appearance: She is normal weight. She is not ill-appearing.   Cardiovascular:      Rate and Rhythm: Normal rate.      Pulses: Normal pulses.   Pulmonary:      Effort: Pulmonary effort is normal.   Abdominal:      General: Bowel sounds are normal.      Palpations: Abdomen is soft.   Musculoskeletal:         General: Normal range of motion.   Skin:     General: Skin is warm and dry.      Capillary Refill: Capillary refill takes less than 2 seconds.   Neurological:      Mental Status: She is alert and oriented to person, place, and time.   Psychiatric:         Mood and Affect: Mood normal.          Discussion with Family: Patient declined call to .     Discharge instructions/Information to patient and family:   See after visit summary for information provided to patient and family.      Provisions for Follow-Up Care:  See after visit summary for information related to follow-up care and any pertinent home health orders.      Mobility at time of Discharge:   Basic Mobility Inpatient Raw Score: 20  JH-HLM Goal: 6: Walk 10 steps or more  JH-HLM Achieved: 7: Walk 25 feet or more  HLM Goal achieved. Continue to encourage appropriate " mobility.     Disposition:   Home    Planned Readmission: None    Discharge Medications:  See after visit summary for reconciled discharge medications provided to patient and/or family.      Administrative Statements   Discharge Statement:  I have spent a total time of 38 minutes in caring for this patient on the day of the visit/encounter. >30 minutes of time was spent on: Risks and benefits of tx options, Instructions for management, Patient and family education, Importance of tx compliance, Risk factor reductions, Impressions, Counseling / Coordination of care, Documenting in the medical record, and Communicating with other healthcare professionals .    **Please Note: This note may have been constructed using a voice recognition system**

## 2024-10-20 ENCOUNTER — HOME CARE VISIT (OUTPATIENT)
Dept: HOME HEALTH SERVICES | Facility: HOME HEALTHCARE | Age: 79
End: 2024-10-20
Payer: COMMERCIAL

## 2024-10-20 VITALS
HEART RATE: 80 BPM | RESPIRATION RATE: 17 BRPM | SYSTOLIC BLOOD PRESSURE: 120 MMHG | OXYGEN SATURATION: 96 % | TEMPERATURE: 97.6 F | DIASTOLIC BLOOD PRESSURE: 78 MMHG

## 2024-10-20 LAB
BACTERIA BLD CULT: ABNORMAL
GRAM STN SPEC: ABNORMAL

## 2024-10-20 PROCEDURE — G0299 HHS/HOSPICE OF RN EA 15 MIN: HCPCS

## 2024-10-20 PROCEDURE — 400013 VN SOC

## 2024-10-20 NOTE — CASE COMMUNICATION
Medication discrepancies or Major drug interactions  Abnormal clinical findings  This report is informational only, no response is needed  [facility] has Admitted your patient to Home Health service with the following disciplines: SN, PT and OT. Pt declined PT and OT, only wants Sn service  Patient stated goals of care be treated at home  Potential barriers to goal achievement  Primary focus of home health care:Genitourinary  Antici pated visit pattern and next visit date 10.24.24 [id number]  Thank you for allowing us to participate in the care of your patient.

## 2024-10-21 ENCOUNTER — OFFICE VISIT (OUTPATIENT)
Dept: UROLOGY | Facility: CLINIC | Age: 79
End: 2024-10-21
Payer: COMMERCIAL

## 2024-10-21 VITALS
TEMPERATURE: 97.5 F | SYSTOLIC BLOOD PRESSURE: 104 MMHG | OXYGEN SATURATION: 95 % | HEIGHT: 65 IN | DIASTOLIC BLOOD PRESSURE: 69 MMHG | WEIGHT: 130.8 LBS | HEART RATE: 105 BPM | BODY MASS INDEX: 21.79 KG/M2

## 2024-10-21 DIAGNOSIS — N28.9 RENAL LESION: ICD-10-CM

## 2024-10-21 DIAGNOSIS — N39.0 FREQUENT UTI: Primary | ICD-10-CM

## 2024-10-21 DIAGNOSIS — N12 PYELONEPHRITIS OF RIGHT KIDNEY: ICD-10-CM

## 2024-10-21 DIAGNOSIS — N31.9 NEUROGENIC BLADDER: ICD-10-CM

## 2024-10-21 LAB — POST-VOID RESIDUAL VOLUME, ML POC: 53 ML

## 2024-10-21 PROCEDURE — 51798 US URINE CAPACITY MEASURE: CPT | Performed by: PHYSICIAN ASSISTANT

## 2024-10-21 PROCEDURE — 99214 OFFICE O/P EST MOD 30 MIN: CPT | Performed by: PHYSICIAN ASSISTANT

## 2024-10-21 RX ORDER — CLOBETASOL PROPIONATE 0.5 MG/G
CREAM TOPICAL
COMMUNITY
Start: 2024-07-29

## 2024-10-21 RX ORDER — TRIAMCINOLONE ACETONIDE 1 MG/G
OINTMENT TOPICAL
COMMUNITY
Start: 2024-09-09

## 2024-10-21 NOTE — PROGRESS NOTES
10/21/2024      Chief Complaint   Patient presents with    Follow-up         Assessment and Plan    Recurrent UTI  - Recent pyelonephritis/bacteremia earlier this month  - Complete Keflex course as prescribed  - Once completed with Keflex, resume Hiprex and start Vit C as well.   - Continue Estrace, cranberry supplement, probiotics, D-mannose, increased hydration for UTI prevention     2. Neurogenic bladder secondary to MS  - PVR 53 ML  - Previously on Myrbetriq for incontinence, consider restarting if needed.     3. Left renal lesion   - Recent CTA from 10/8/24 showing 1.7 cm enhancing mass in the left posterior mid pole, concerning for renal cell carcinoma.   - Obtain MRI for further evaluation and follow up with MD for review.     History of Present Illness  Francesca Retana is a 78 y.o. female here for follow up evaluation of recurrent UTI, neurogenic bladder, and left renal lesion. She was recently hospitalized last week with right pyelonephritis, UTI, sepsis and bacteremia. Imaging showed findings consistent with UTI as well as left renal lesion concerning for RCC. Previously noted to have renal cyst. She currently is feeling better. She notes some worsening incontinence since having UTI. No dysuria, hematuria, flank pain or fevers.     Cystoscopy last  year - The bladder was without mucosal abnormalities except for signs of chronic cystitis cystica particularly at the trigone.     Review of Systems   Constitutional:  Negative for chills and fever.   Respiratory:  Negative for shortness of breath.    Cardiovascular:  Negative for chest pain.   Gastrointestinal:  Negative for abdominal pain.   Genitourinary:  Negative for difficulty urinating, dysuria, flank pain, frequency, hematuria, pelvic pain and urgency.   Neurological:  Negative for dizziness.       Past Medical History  Past Medical History:   Diagnosis Date    Cancer (HCC)     only has the left lung     MS (multiple sclerosis) (HCC)     Neurogenic  bladder     Trigeminal neuralgia        Past Social History  Past Surgical History:   Procedure Laterality Date    LUNG REMOVAL, TOTAL      right      Social History     Tobacco Use   Smoking Status Former    Passive exposure: Past (as a teenager growing up)   Smokeless Tobacco Never       Past Family History  Family History   Problem Relation Age of Onset    Cirrhosis Father     No Known Problems Mother     No Known Problems Daughter     No Known Problems Daughter        Past Social history  Social History     Socioeconomic History    Marital status:      Spouse name: Not on file    Number of children: Not on file    Years of education: Not on file    Highest education level: Not on file   Occupational History    Not on file   Tobacco Use    Smoking status: Former     Passive exposure: Past (as a teenager growing up)    Smokeless tobacco: Never   Vaping Use    Vaping status: Never Used   Substance and Sexual Activity    Alcohol use: Yes     Comment: Occ.    Drug use: Never    Sexual activity: Not Currently   Other Topics Concern    Not on file   Social History Narrative    Not on file     Social Determinants of Health     Financial Resource Strain: Low Risk  (3/12/2024)    [facility]    Overall Financial Resource Strain (CARDIA)     Difficulty of Paying Living Expenses: Not very hard   Food Insecurity: No Food Insecurity (10/16/2024)    Hunger Vital Sign     Worried About Running Out of Food in the Last Year: Never true     Ran Out of Food in the Last Year: Never true   Transportation Needs: No Transportation Needs (10/16/2024)    PRAPARE - Transportation     Lack of Transportation (Medical): No     Lack of Transportation (Non-Medical): No   Physical Activity: Not on file   Stress: Patient Declined (3/12/2024)    [facility] from [facility], [facility]    [employer] - Occupational  Stress Questionnaire     Feeling of Stress : Patient declined   Social Connections: Feeling Socially Integrated (3/12/2024)    [facility]    OASIS : Social Isolation     How often do you feel lonely or isolated from those around you?: Never   Intimate Partner Violence: Not At Risk (3/12/2024)    [facility]    Humiliation, Afraid, Rape, and Kick questionnaire     Fear of Current or Ex-Partner: No     Emotionally Abused: No     Physically Abused: No     Sexually Abused: No   Housing Stability: Low Risk  (10/16/2024)    Housing Stability Vital Sign     Unable to Pay for Housing in the Last Year: No     Number of Times Moved in the Last Year: 0     Homeless in the Last Year: No       Current Medications  Current Outpatient Medications   Medication Sig Dispense Refill    albuterol (PROVENTIL HFA,VENTOLIN HFA) 90 mcg/act inhaler Inhale 2 puffs every 6 (six) hours as needed      aspirin (ECOTRIN LOW STRENGTH) 81 mg EC tablet Take 81 mg by mouth daily      carBAMazepine (TEGretol) 200 mg tablet Take 1 tablet by mouth 2 (two) times a day      cephalexin (KEFLEX) 500 mg capsule Take 1 capsule (500 mg total) by mouth every 6 (six) hours for 6 days 24 capsule 0    Cholecalciferol 50 MCG (2000 UT) CAPS Take 2 capsules by mouth daily      clobetasol (TEMOVATE) 0.05 % cream PLEASE SEE ATTACHED FOR DETAILED DIRECTIONS      cyanocobalamin (VITAMIN B-12) 100 MCG tablet Take 100 mcg by mouth daily      estradiol (ESTRACE VAGINAL) 0.1 mg/g vaginal cream Apply pea sized amount around urethra and inside vaginal opening 3 times per week 42.5 g 2    magnesium Oxide (MagOx 400) 400 mg TABS Take 400 mg by mouth      methenamine hippurate (HIPREX) 1 g tablet Take 1 tablet (1 g total) by mouth 2 (two) times a day with meals 180 tablet 0    methylcellulose (Citrucel) 500 mg tablet Take 2 each by mouth      Probiotic  "Product (PROBIOTIC PO) Take 6 Billion Cells by mouth daily.      triamcinolone (KENALOG) 0.1 % ointment PLEASE SEE ATTACHED FOR DETAILED DIRECTIONS      betamethasone, augmented, (DIPROLENE) 0.05 % ointment as needed (Patient not taking: Reported on 10/21/2024)      saccharomyces boulardii (FLORASTOR) 250 mg capsule Take 1 capsule (250 mg total) by mouth 2 (two) times a day for 14 days (Patient not taking: Reported on 10/20/2024) 28 capsule 0     No current facility-administered medications for this visit.       Allergies  No Known Allergies      The following portions of the patient's history were reviewed and updated as appropriate: allergies, current medications, past medical history, past social history, past surgical history and problem list.      Vitals  Vitals:    10/21/24 1105   BP: 104/69   BP Location: Left arm   Patient Position: Sitting   Cuff Size: Standard   Pulse: 105   Temp: 97.5 °F (36.4 °C)   TempSrc: Temporal   SpO2: 95%   Weight: 59.3 kg (130 lb 12.8 oz)   Height: 5' 5\" (1.651 m)           Physical Exam  Physical Exam  Constitutional:       Appearance: Normal appearance.   HENT:      Head: Normocephalic and atraumatic.      Right Ear: External ear normal.      Left Ear: External ear normal.      Nose: Nose normal.   Eyes:      General: No scleral icterus.     Conjunctiva/sclera: Conjunctivae normal.   Cardiovascular:      Pulses: Normal pulses.   Pulmonary:      Effort: Pulmonary effort is normal.   Musculoskeletal:      Cervical back: Normal range of motion.      Comments: Ambulates with walker   Neurological:      General: No focal deficit present.      Mental Status: She is alert and oriented to person, place, and time.   Psychiatric:         Mood and Affect: Mood normal.         Behavior: Behavior normal.         Thought Content: Thought content normal.         Judgment: Judgment normal.           Results  Recent Results (from the past 1 hour(s))   POCT Measure PVR    Collection Time: " "10/21/24 11:12 AM   Result Value Ref Range    POST-VOID RESIDUAL VOLUME, ML POC 53 mL   ]  No results found for: \"PSA\"  Lab Results   Component Value Date    CALCIUM 8.2 (L) 10/19/2024    K 3.3 (L) 10/19/2024    CO2 29 10/19/2024     10/19/2024    BUN 17 10/19/2024    CREATININE 0.64 10/19/2024     Lab Results   Component Value Date    WBC 5.58 10/19/2024    HGB 10.5 (L) 10/19/2024    HCT 33.7 (L) 10/19/2024    MCV 97 10/19/2024     10/19/2024           Orders  Orders Placed This Encounter   Procedures    POCT Measure PVR       Carlee Birch      "

## 2024-10-21 NOTE — UTILIZATION REVIEW
NOTIFICATION OF ADMISSION DISCHARGE   This is a Notification of Discharge from [facility]. Please be advised that this patient has been discharge from our facility. Below you will find the admission and discharge date and time including the patient’s disposition.   UTILIZATION REVIEW CONTACT:  María Smith  Utilization   Network Utilization Review Department  Phone: [phone] x carefully listen to the prompts. All voicemails are confidential.  Email: [email or web address]     ADMISSION INFORMATION  PRESENTATION DATE: 10/15/2024  5:53 PM  OBERVATION ADMISSION DATE: N/A  INPATIENT ADMISSION DATE: 10/15/24  8:32 PM   DISCHARGE DATE: 10/19/2024  6:05 PM   DISPOSITION:Home/Self Care    Network Utilization Review Department  ATTENTION: Please call with any questions or concerns to [phone] and carefully listen to the prompts so that you are directed to the right person. All voicemails are confidential.   For Discharge needs, contact Care Management DC Support Team at [phone] opt. 2  Send all requests for admission clinical reviews, approved or denied determinations and any other requests to dedicated fax number below belonging to the campus where the patient is receiving treatment. List of dedicated fax numbers for the Facilities:  FACILITY NAME UR FAX NUMBER   ADMISSION DENIALS (Administrative/Medical Necessity) [phone]   DISCHARGE SUPPORT TEAM ([facility]) [phone]   PARENT CHILD HEALTH (Maternity/NICU/Pediatrics) [phone]   [facility] [phone]   [facility] [phone]   [facility] [phone]   [facility] [phone]   [facility] [phone]   [facility] [phone]   [facility] [phone]   [facility]  [phone]   [facility] [phone]   [facility] [phone]   [facility] [phone]   [facility] [phone]

## 2024-10-21 NOTE — PATIENT INSTRUCTIONS
UTI prevention:  Hiprex 1g BID  Vit C 1g daily.  D-mannose 2g daily  Cranberry 500 mg daily   Daily probiotic  Estrace cream 3 times weekly

## 2024-10-22 ENCOUNTER — HOME CARE VISIT (OUTPATIENT)
Dept: HOME HEALTH SERVICES | Facility: HOME HEALTHCARE | Age: 79
End: 2024-10-22
Payer: COMMERCIAL

## 2024-10-22 NOTE — CASE COMMUNICATION
Sn called patient to schedule sn visit for following day. Pt declined sn visit and requested to be discharged from homecare services due to not needing the service.  Pt stated she is good.

## 2024-10-26 ENCOUNTER — HOME CARE VISIT (OUTPATIENT)
Dept: HOME HEALTH SERVICES | Facility: HOME HEALTHCARE | Age: 79
End: 2024-10-26
Payer: COMMERCIAL

## 2024-10-26 NOTE — CASE COMMUNICATION
Pt discharged from service as per pts request. Pt has refused further visits of home care. She will continue to follow up with

## 2024-10-29 PROCEDURE — G0180 MD CERTIFICATION HHA PATIENT: HCPCS | Performed by: FAMILY MEDICINE

## 2024-11-15 PROBLEM — N39.0 RECURRENT URINARY TRACT INFECTION: Status: RESOLVED | Noted: 2024-10-16 | Resolved: 2024-11-15

## 2024-11-15 PROBLEM — N12 PYELONEPHRITIS OF RIGHT KIDNEY: Status: RESOLVED | Noted: 2024-10-15 | Resolved: 2024-11-15

## 2024-11-15 PROBLEM — A41.9 SEPSIS (HCC): Status: RESOLVED | Noted: 2024-10-16 | Resolved: 2024-11-15

## 2024-11-19 ENCOUNTER — HOSPITAL ENCOUNTER (OUTPATIENT)
Dept: MRI IMAGING | Facility: HOSPITAL | Age: 79
Discharge: HOME | End: 2024-11-19
Payer: COMMERCIAL

## 2024-11-19 DIAGNOSIS — N28.9 RENAL LESION: ICD-10-CM

## 2024-11-19 PROCEDURE — 74183 MRI ABD W/O CNTR FLWD CNTR: CPT

## 2024-11-19 PROCEDURE — A9585 GADOBUTROL INJECTION: HCPCS | Performed by: PHYSICIAN ASSISTANT

## 2024-11-19 RX ORDER — GADOBUTROL 604.72 MG/ML
5 INJECTION INTRAVENOUS
Status: COMPLETED | OUTPATIENT
Start: 2024-11-19 | End: 2024-11-19

## 2024-11-19 RX ADMIN — GADOBUTROL 5 ML: 604.72 INJECTION INTRAVENOUS at 16:29

## 2024-11-23 ENCOUNTER — APPOINTMENT (OUTPATIENT)
Dept: LAB | Facility: HOSPITAL | Age: 79
End: 2024-11-23
Payer: COMMERCIAL

## 2024-11-23 ENCOUNTER — NURSE TRIAGE (OUTPATIENT)
Dept: OTHER | Facility: OTHER | Age: 79
End: 2024-11-23

## 2024-11-23 DIAGNOSIS — N31.9 NEUROGENIC BLADDER: Primary | ICD-10-CM

## 2024-11-23 DIAGNOSIS — N30.00 ACUTE CYSTITIS WITHOUT HEMATURIA: ICD-10-CM

## 2024-11-23 DIAGNOSIS — N31.9 NEUROGENIC BLADDER: ICD-10-CM

## 2024-11-23 LAB
BACTERIA UR QL AUTO: ABNORMAL /HPF
BILIRUB UR QL STRIP: NEGATIVE
CLARITY UR: ABNORMAL
COLOR UR: YELLOW
GLUCOSE UR STRIP-MCNC: NEGATIVE MG/DL
HGB UR QL STRIP.AUTO: ABNORMAL
KETONES UR STRIP-MCNC: NEGATIVE MG/DL
LEUKOCYTE ESTERASE UR QL STRIP: ABNORMAL
MUCOUS THREADS UR QL AUTO: ABNORMAL
NITRITE UR QL STRIP: NEGATIVE
NON-SQ EPI CELLS URNS QL MICRO: ABNORMAL /HPF
PH UR STRIP.AUTO: 7 [PH]
PROT UR STRIP-MCNC: ABNORMAL MG/DL
RBC #/AREA URNS AUTO: ABNORMAL /HPF
SP GR UR STRIP.AUTO: 1.01 (ref 1–1.03)
UROBILINOGEN UR STRIP-ACNC: <2 MG/DL
WBC #/AREA URNS AUTO: ABNORMAL /HPF

## 2024-11-23 PROCEDURE — 87086 URINE CULTURE/COLONY COUNT: CPT

## 2024-11-23 PROCEDURE — 81001 URINALYSIS AUTO W/SCOPE: CPT

## 2024-11-23 NOTE — TELEPHONE ENCOUNTER
Patient called in with symptoms of UTI including pain with urination and frequency. Patient has frequent recurrence of UTI's with recent hospitalization. Follows with Urology. Urine culture and Urinalysis ordered and patient instructed to visit an outpatient lab for collection of urine.

## 2024-11-23 NOTE — TELEPHONE ENCOUNTER
"Regarding: UTI/urine test  ----- Message from Candy CASTRO sent at 11/23/2024  9:39 AM EST -----  \"I need to get a urine test done. I think I have a UTI\"    "

## 2024-11-23 NOTE — TELEPHONE ENCOUNTER
"Reason for Disposition  • > 2 UTI's in last year    Answer Assessment - Initial Assessment Questions  1. SEVERITY: \"How bad is the pain?\"  (e.g., Scale 1-10; mild, moderate, or severe)      Pain of a 7  2. FREQUENCY: \"How many times have you had painful urination today?\"       Patient was up all night long with pain and symptoms   3. PATTERN: \"Is pain present every time you urinate or just sometimes?\"       Yes, everytime   4. ONSET: \"When did the painful urination start?\"       Started on Thursday overnight   5. FEVER: \"Do you have a fever?\" If Yes, ask: \"What is your temperature, how was it measured, and when did it start?\"      No fever   6. PAST UTI: \"Have you had a urine infection before?\" If Yes, ask: \"When was the last time?\" and \"What happened that time?\"       October 2024  7. CAUSE: \"What do you think is causing the painful urination?\"  (e.g., UTI, scratch, Herpes sore)      UTI   8. OTHER SYMPTOMS: \"Do you have any other symptoms?\" (e.g., blood in urine, flank pain, genital sores, urgency, vaginal discharge)      No flank pain or blood in urine    Protocols used: Urination Pain - Female-Adult-AH    "

## 2024-11-25 ENCOUNTER — RESULTS FOLLOW-UP (OUTPATIENT)
Dept: UROLOGY | Facility: CLINIC | Age: 79
End: 2024-11-25

## 2024-11-25 LAB — BACTERIA UR CULT: NORMAL

## 2024-11-25 NOTE — TELEPHONE ENCOUNTER
Spoke with pt relaying Carlee GUZMAN message,  No UTI   Pt verbalized understanding   ----- Message from Carlee Birch PA-C sent at 11/25/2024 10:12 AM EST -----  No UTI

## 2024-12-09 ENCOUNTER — OFFICE VISIT (OUTPATIENT)
Dept: UROLOGY | Facility: CLINIC | Age: 79
End: 2024-12-09
Payer: COMMERCIAL

## 2024-12-09 VITALS
OXYGEN SATURATION: 92 % | BODY MASS INDEX: 21.66 KG/M2 | RESPIRATION RATE: 16 BRPM | TEMPERATURE: 97.4 F | DIASTOLIC BLOOD PRESSURE: 70 MMHG | WEIGHT: 130 LBS | HEART RATE: 91 BPM | HEIGHT: 65 IN | SYSTOLIC BLOOD PRESSURE: 122 MMHG

## 2024-12-09 DIAGNOSIS — N31.9 NEUROGENIC BLADDER: ICD-10-CM

## 2024-12-09 DIAGNOSIS — N28.89 LEFT RENAL MASS: ICD-10-CM

## 2024-12-09 DIAGNOSIS — N39.0 FREQUENT UTI: Primary | ICD-10-CM

## 2024-12-09 LAB
POST-VOID RESIDUAL VOLUME, ML POC: 7 ML
SL AMB  POCT GLUCOSE, UA: NORMAL
SL AMB LEUKOCYTE ESTERASE,UA: NORMAL
SL AMB POCT BILIRUBIN,UA: NORMAL
SL AMB POCT BLOOD,UA: NORMAL
SL AMB POCT CLARITY,UA: YELLOW
SL AMB POCT COLOR,UA: NORMAL
SL AMB POCT KETONES,UA: NORMAL
SL AMB POCT NITRITE,UA: NORMAL
SL AMB POCT PH,UA: 5
SL AMB POCT SPECIFIC GRAVITY,UA: 1.01
SL AMB POCT URINE PROTEIN: NORMAL
SL AMB POCT UROBILINOGEN: 0.2

## 2024-12-09 PROCEDURE — 87077 CULTURE AEROBIC IDENTIFY: CPT | Performed by: UROLOGY

## 2024-12-09 PROCEDURE — 51798 US URINE CAPACITY MEASURE: CPT | Performed by: UROLOGY

## 2024-12-09 PROCEDURE — 81002 URINALYSIS NONAUTO W/O SCOPE: CPT | Performed by: UROLOGY

## 2024-12-09 PROCEDURE — 99214 OFFICE O/P EST MOD 30 MIN: CPT | Performed by: UROLOGY

## 2024-12-09 PROCEDURE — 87086 URINE CULTURE/COLONY COUNT: CPT | Performed by: UROLOGY

## 2024-12-09 PROCEDURE — 87186 SC STD MICRODIL/AGAR DIL: CPT | Performed by: UROLOGY

## 2024-12-09 RX ORDER — NITROFURANTOIN 25; 75 MG/1; MG/1
CAPSULE ORAL
COMMUNITY
Start: 2024-11-25

## 2024-12-09 NOTE — PROGRESS NOTES
UROLOGY FOLLOW-UP ENCOUNTER    Francesca Retana is a 79 y.o. female with recurrent UTIs, renal lesion    Pertinent non-urologic PMH: MS; lung cancer (entire R lung removed 2017, also had chemorad)    Anticoagulation: Aspirin 81 mg    History of neurogenic bladder secondary to MS    History of recurrent UTIs with previous hospitalizations for pyelonephritis    On Estrace, cranberry supplement, probiotics, d-mannose for UTI prevention    Cystoscopy 2/14/2023: Negative    CTA from 10/8/24 showing 1.7 cm enhancing mass in the left posterior mid pole     CT abdomen pelvis without contrast 10/15/2024: Mild right hydroureteronephrosis with diffuse right perinephric and periureteral stranding; no stones in right kidney; punctate nonobstructing left lower pole stone    PVR 53 cc on 10/21/2024    MRI abdomen with and without contrast 11/19/2024: Enhancing solid 1.6 cm left upper pole lesion    Urine dip 12/9/2024: Positive leukocyte esterase, positive nitrite, positive blood    PVR 7 cc on 12/9/2024    Typical UTI symptoms are dysuria, frequency      Assessment and plan:     Recurrent UTI, neurogenic bladder    Patient already established with urology team with recurrent UTIs and neurogenic bladder.  Has known MS.    For the past month or so, she has not had UTI fortunately.  She does sound like she has a lot of overlapping symptoms of her UTIs with her overactive bladder neurogenic bladder issues.  For instance, we can see is a pretty normal symptom for her given her neurogenic bladder and overactive bladder.  I therefore explained that it was important for her to discern overactive bladder symptoms and neurogenic bladder symptoms from her typical UTI symptoms.  It sounds like she can get dysuria with her UTIs I explained that if she were to get dysuria, or other systemic symptoms such as fevers or chills, she would then prompt treatment for UTI.  I explained otherwise, she would be getting overtreated since all of her urine  testing is likely going to be positive moving forward since her urine is likely colonized.        Renal mass    After obtaining a medical history, performing a physical exam, reviewing all available outside medical records, radiographs, and laboratory studies, I had a lengthy discussion with the patient regarding implications of a diagnosis of renal mass.    Specifically, we discussed that over 80% of enhancing renal masses are malignant.   I explained that 15-20% of newly-diagnosed localized renal lesions will prove benign upon resection.  Such lesions -- largely oncocytomas and lipid-poor angiomyolipomas - are indistinguishable from renal cell carcinoma on modern imaging.  Predictive models based on variables such as age, sex, size, symptoms at diagnosis and smoking history exist; however, these are relatively crude and their clinical utility is currently limited.    We did discuss the value of renal mass biopsy.  Traditionally, biopsy of enhancing renal masses has been hampered by sampling error and was reserved only for cases where lymphoma, infection, or a metastasis from another organ to the kidney was suspected. In fact, for many years biopsy was rarely employed.  Recently value of biopsy has been recognized for select patients.  In deciding whether to undergo biopsy, I explained that several factors must be considered:  (1) failure to obtain adequate tissue occurs in approximately 10% of biopsies (up to 20% in some reports); (2) although positive predictive value of biopsy is high (>95%), negative predictive value can range from 80% to 95% -- i.e., in up to 20% of cases, a malignancy may be present even if the biopsy suggests that the suspected tissue is benign (3) ability to differentiate high grade vs low grade pathology is currently rather limited (4) small but real risks of biopsy must be considered.  I explained that the risks of biopsy are as follows: bleeding requiring transfusion in (1-2%), extremely  remote risk of renal unit loss, and in cases of severe bleeding potential disruptions of surgical tissue planes.  I also explained that risks of tumor seeding are on the order of 1 in 10,000 biopsies.  Nevertheless, these risks of seeding rise for infiltrative masses, as these may represent sarcomatoid RCC or urothelial carcinoma.  Furthermore, biopsy of cystic lesions is generally discouraged, given the theoretical possibility of seeding upon cyst rupture and the fact that imaging features of these so-called Bosniak cysts are most often adequately robust to differentiate benign from malignant disease.  I explained that the ultimate choice regarding renal mass biopsy mus be made based on whether results of biopsy will change our management plan.  In particular, if the tumor was found to be benign on biopsy, would we decide to forego resection, knowing the following facts:  (1) Accuracy of modern renal biopsy is ~89%  (2) angiomyolipomas, although benign, will require long-term surveillance and, upon growth, possible intervention given risks of spontaneous bleeding (3) oncocytomas, also considered benign, can be notoriously difficult to differentiate from chromophobe renal cell carcinomas on biopsy, may harbor RCC in up to 18% of cases, and have been described to invade local structures, (4) accuracy for determining renal malignancy grade is suboptimal.    Next we discussed issues of tumor size, stage, grade and histology, and the prognostic significance of each.  I explained the difference between clinical and pathologic stage, and communicated that tumor grade and histology can only be definitively assessed if the mass is resected.  Furthermore, I briefly communicated the nuances regarding anatomic complexity of the tumor.     We had a lengthy discussion regarding treatment options, which I placed in the following categories: (1) active surveillance (2) ablative techniques and (3) surgical excision.  I briefly  explained that radiation therapy has no role in contemporary management of localized renal masses.  Furthermore, I explained that medical therapy (i.e. targeted therapy / immunotherapy / chemotherapy) is largely reserved for patients with systemic disease.  We discussed the risks and benefits of each approach.  In particular, I explained that surgical excision is the absolute gold-standard treatment for localized renal cell carcinoma; however, approaches such as active surveillance and ablative techniques (i.e. cryotherapy and radiofrequency ablation) can be helpful in effectively managing renal masses for patients who are at high risk for surgical intervention.       With regard to active surveillance, I communicated that this appears to be an extremely safe short-term strategy; however, given the fact that approximately 20% of small renal tumors will prove high grade upon resection, this approach is not without risk. A pooled analysis of the literature demonstrated that approximately 2% of patients who are on active surveillance will progress to systemic disease.  I explained that to date no perfect predictor of tumor progression exists.  Of those tumors that progress on active surveillance, nearly all demonstrate rapid tumor growth prior to disease dissemination (thereby affording a theoretical opportunity for timely intervention); however, a handful of possible cases with metastatic progression in the setting of net-zero tumor growth have been documented.  Nevertheless, in patients with significant competing risks of mortality, active surveillance of a renal mass is a viable strategy.  In fact many times it is an important and prudent approach that spares elderly and/ or comorbid patients from overtreatment.      We then discussed tumor ablation (also known as focal therapy) such as cryotherapy and radiofrequency ablation (RFA).  I explained that this treatment strategy has appeal due to ease of execution,  favorable patient tolerance, and perceived low periprocedural risks.  Furthermore, this approach leaves the vast majority of the non-cancerous portion of the kidney untouched and is, therefore, nephron-sparing.  Nevertheless, focal therapy has several major drawbacks.  These include a lack of tumor removal for therapeutic and diagnostic considerations compounded by absence of high-quality data regarding long-term oncological outcomes. In fact, current data are far less robust for ablation therapy than for surgical resection.  A recent analysis of the cumulative literature by a panel convened by the American U[employer] Association, demonstrated that even with shorter follow-up, focal therapy has higher local recurrence rates than surgical intervention.  Indeed, some experts contend that the impact of focal therapy on the natural history of the small renal mass is yet to be proven.  I communicated my opinion that focal therapy largely should be reserved for patients who are at high-risk for surgical intervention and who are uncomfortable with the concept of active surveillance.    Finally, we discussed at length the surgical options.  I explained that I prioritize the goals of surgical treatment in the following fashion:   Primary goal: oncological safety  Secondary goal: nephron preservation  Tertiary goal: application of minimally-invasive surgical approaches      I explained that over the past 15-20 years, partial nephrectomy (i.e, removal of only the renal tumor without sacrifice of the entire kidney) has become the standard of care not only for essential but also for elective indications.  I explained that powerful long-term data now demonstrate that patients whose renal tissue is preserved enjoy lower risks of not only renal insufficiency, but also of cardiovascular disease.  Most importantly, patients who undergo nephron-sparing surgery have been shown to exhibit longer overall survival than those patients  whose entire kidney was extirpated.  As such, I encourage partial nephrectomy in all cases where this is feasible.  I offer open partial nephrectomy for the highly complex lesions (i.e. deep, endophytic, central, hilar, etc.) and perform robot-assisted laparoscopic partial nephrectomy on those lesions that are amenable to minimally-invasive approaches. I explained that the benefits of the minimally-invasive approaches stem from decreased blood loss and faster recovery.  When partial nephrectomy is not possible, a radical nephrectomy is necessary. I explained that although prospective randomized trials of open versus laparoscopic radical nephrectomy were never completed, long-term retrospective data suggest oncological equivalence between the two approaches.  Furthermore, I communicated that given significantly lower intra-operative blood loss and shorter convalescence, laparoscopic radical nephrectomy, when possible, is the standard of care for renal surgery that requires total removal of the kidney.    The risks of the surgery were discussed in detail including medical and anesthetic complications (e.g. heart attack, stroke, deep vein thrombosis, pulmonary embolism, infection (pneumonia, etc), bleeding with possible need for transfusion, adjacent organ involvement or injury, wound complications, reaction to medications).  We discussed that overall kidney function may be impaired. We reviewed the risks for immediate or delayed, temporary or permanent dialysis in the context of the patient's current situation.  The expected hospital and post-operative courses were reviewed.  We reviewed what to expect with regard to incisions, post-operative pain, and risks of subsequent hernias. I explained that during open surgery a portion of a rib may be resected.  I communicated that there is always a small chance of open conversion when minimally-invasive approaches are employed. For partial nephrectomy, I communicated the  "risks of urinary leak/fistula and the potential for a prolonged drain at the operative site or a ureteral stent.  I also explained that conversion to a radical nephrectomy at the time or surgery or in a delayed fashion is rare but possible.     I communicated that the decision process regarding renal surgery can be extremely complex. I invited the patient to become an active participant in their care and to become proactive in learning about renal masses and the risks and benefits of treatment.  I suggested that the AUA Guidelines for the Management of Clinical Stage I Renal Mass and the accompanying webinar that can be found at www.auanet.org/guidelines are an excellent source for such information.  I explained that I am always willing to answer any questions that may arise following the office visit and encouraged the patient to call my office with any questions during this anxiety-provoking period.            PLAN  -+U dip. Not having dysuria. Told her that we should hold off on UTI treatment for now. Obtaining urine culture for data purposes in case gets symptomatic in future.  -Continue Estrace, Hiprex, cranberry supplement, probiotics, d-mannose for UTI prevention  -Understands she should only go for urine if she has dysuria, which seems to be the only symptom that can distinguish UTI from her MS symptoms  -I will see her in about in 6 months with MRI. If lesion is stable, we can continue surveillance.            Portions of the above record have been created with voice recognition software.  Occasional wrong word or \"sound alike\" substitution may have occurred due to the inherent limitations of voice recognition software.  Read the chart carefully and recognize, using context, where substitution may have occurred.      Pratik Walters, DO        Chief Complaint     Renal mass    History of Present Illness     See summary above    No fevers or chills        The following portions of the patient's history " "were reviewed and updated as appropriate: allergies, current medications, past family history, past medical history, past social history, past surgical history and problem list.            Review of Systems     Review of Systems   Constitutional:  Negative for chills and fever.   Respiratory:  Negative for cough and shortness of breath.    Gastrointestinal:  Negative for abdominal pain.   Genitourinary:  Negative for dysuria and hematuria.   Neurological:  Negative for dizziness and headaches.   Psychiatric/Behavioral:  Negative for agitation and behavioral problems.        Allergies     No Known Allergies    Physical Exam     Physical Exam  Constitutional:       General: She is not in acute distress.  HENT:      Head: Normocephalic and atraumatic.   Pulmonary:      Effort: Pulmonary effort is normal. No respiratory distress.   Abdominal:      General: Abdomen is flat.      Palpations: Abdomen is soft.      Tenderness: There is no right CVA tenderness or left CVA tenderness.   Skin:     General: Skin is warm and dry.   Neurological:      General: No focal deficit present.      Mental Status: She is alert and oriented to person, place, and time.   Psychiatric:         Mood and Affect: Mood normal.         Behavior: Behavior normal.             Vital Signs  Vitals:    12/09/24 1119   BP: 122/70   BP Location: Left arm   Patient Position: Sitting   Cuff Size: Standard   Pulse: 91   Resp: 16   Temp: (!) 97.4 °F (36.3 °C)   TempSrc: Temporal   SpO2: 92%   Weight: 59 kg (130 lb)   Height: 5' 5\" (1.651 m)         Current Medications       Current Outpatient Medications:     albuterol (PROVENTIL HFA,VENTOLIN HFA) 90 mcg/act inhaler, Inhale 2 puffs every 6 (six) hours as needed, Disp: , Rfl:     aspirin (ECOTRIN LOW STRENGTH) 81 mg EC tablet, Take 81 mg by mouth daily, Disp: , Rfl:     betamethasone, augmented, (DIPROLENE) 0.05 % ointment, as needed, Disp: , Rfl:     carBAMazepine (TEGretol) 200 mg tablet, Take 1 tablet by " mouth 2 (two) times a day, Disp: , Rfl:     Cholecalciferol 50 MCG (2000 UT) CAPS, Take 2 capsules by mouth daily, Disp: , Rfl:     clobetasol (TEMOVATE) 0.05 % cream, PLEASE SEE ATTACHED FOR DETAILED DIRECTIONS, Disp: , Rfl:     cyanocobalamin (VITAMIN B-12) 100 MCG tablet, Take 100 mcg by mouth daily, Disp: , Rfl:     estradiol (ESTRACE VAGINAL) 0.1 mg/g vaginal cream, Apply pea sized amount around urethra and inside vaginal opening 3 times per week, Disp: 42.5 g, Rfl: 2    magnesium Oxide (MagOx 400) 400 mg TABS, Take 400 mg by mouth, Disp: , Rfl:     methenamine hippurate (HIPREX) 1 g tablet, Take 1 tablet (1 g total) by mouth 2 (two) times a day with meals, Disp: 180 tablet, Rfl: 0    nitrofurantoin (MACROBID) 100 mg capsule, , Disp: , Rfl:     Probiotic Product (PROBIOTIC PO), Take 6 Billion Cells by mouth daily., Disp: , Rfl:     triamcinolone (KENALOG) 0.1 % ointment, PLEASE SEE ATTACHED FOR DETAILED DIRECTIONS, Disp: , Rfl:     methylcellulose (Citrucel) 500 mg tablet, Take 2 each by mouth (Patient not taking: Reported on 12/9/2024), Disp: , Rfl:     Active Problems     Patient Active Problem List   Diagnosis    Left renal mass    Multiple sclerosis (HCC)    Trigeminal neuralgia    History of lung cancer    Bacteremia due to Proteus species    Neurogenic bladder       Past Medical History     Past Medical History:   Diagnosis Date    Cancer (HCC)     only has the left lung     MS (multiple sclerosis) (HCC)     Neurogenic bladder     Trigeminal neuralgia        Surgical History     Past Surgical History:   Procedure Laterality Date    LUNG REMOVAL, TOTAL      right          Family History     Family History   Problem Relation Age of Onset    Cirrhosis Father     No Known Problems Mother     No Known Problems Daughter     No Known Problems Daughter        Social History     Social History     Social History     Tobacco Use   Smoking Status Former    Passive exposure: Past (as a teenager growing up)  "  Smokeless Tobacco Never       Pertinent Lab Values     Lab Results   Component Value Date    CREATININE 0.64 10/19/2024       No results found for: \"PSA\"      Pertinent Imaging     The patient's images were reviewed by me personally and also in real time with them in the examination room using our PACS imaging system.      MRI abdomen with and without contrast 11/19/2024: Enhancing solid 1.6 cm left upper pole lesion    Pertinent Pathology     N/A      I have spent 30 minutes with Patient  today in which greater than 50% of this time was spent in counseling/coordination of care regarding Diagnostic results, Prognosis, Risks and benefits of tx options, Instructions for management, Patient and family education, Importance of tx compliance, Risk factor reductions, Impressions, Counseling / Coordination of care, Documenting in the medical record, Reviewing / ordering tests, medicine, procedures  , and Obtaining or reviewing history  .    Please note this time includes cumulative time on the day of encounter, including reviewing medical records and/or coordinating care among the patient's other specialists.    "

## 2024-12-11 LAB
BACTERIA UR CULT: ABNORMAL
BACTERIA UR CULT: ABNORMAL

## 2024-12-18 ENCOUNTER — APPOINTMENT (OUTPATIENT)
Dept: LAB | Facility: HOSPITAL | Age: 79
End: 2024-12-18
Payer: COMMERCIAL

## 2024-12-18 ENCOUNTER — NURSE TRIAGE (OUTPATIENT)
Age: 79
End: 2024-12-18

## 2024-12-18 DIAGNOSIS — R39.9 UTI SYMPTOMS: Primary | ICD-10-CM

## 2024-12-18 DIAGNOSIS — N39.0 URINARY TRACT INFECTION WITHOUT HEMATURIA, SITE UNSPECIFIED: Primary | ICD-10-CM

## 2024-12-18 LAB

## 2024-12-18 PROCEDURE — 87086 URINE CULTURE/COLONY COUNT: CPT

## 2024-12-18 PROCEDURE — 81001 URINALYSIS AUTO W/SCOPE: CPT

## 2024-12-18 RX ORDER — SULFAMETHOXAZOLE AND TRIMETHOPRIM 800; 160 MG/1; MG/1
1 TABLET ORAL EVERY 12 HOURS SCHEDULED
Qty: 14 TABLET | Refills: 0 | Status: SHIPPED | OUTPATIENT
Start: 2024-12-18 | End: 2024-12-25

## 2024-12-18 NOTE — TELEPHONE ENCOUNTER
Patient of Dr. Walters last seen in the [place] office called in with concerns of dysuria and urinary frequency that started two days ago. Denies any fevers, hematuria or other symptoms. Patient has a history of UTI's. Patient states sometimes she feels she is only going small amounts but is only drinking 16 ounces of water a day. Advised to increase water intake and avoid bladder irritants. ED precautions reviewed. Placed urine orders to rule out infection.       Reason for Disposition   The patient has an unknown case of mild dysuria    Answer Assessment - Initial Assessment Questions  1. When did your symptoms start?   Two days ago   2. Do you experience pain or burning with every void?   Yes   3. Do you have any other urinary symptoms such as urinary frequency, urgency, incontinence, blood in your urine?   Urinary frequency   5. Do you have a fever of 101 or higher? How did you take your temperature?   No  7. Have you become unable to urinate?   No  8. Do you have a history of urinary tract infections?   Yes    Protocols used: Urology-Dysuria-ADULT-[place]

## 2024-12-18 NOTE — TELEPHONE ENCOUNTER
Rx for Bactrim sent to pharmacy based on urine culture from 12/9. Hold Hiprex until complete with abx.

## 2024-12-19 ENCOUNTER — RESULTS FOLLOW-UP (OUTPATIENT)
Dept: UROLOGY | Facility: CLINIC | Age: 79
End: 2024-12-19

## 2024-12-19 LAB — BACTERIA UR CULT: NORMAL

## 2024-12-23 NOTE — TELEPHONE ENCOUNTER
----- Message from Carlee Birch PA-C sent at 12/19/2024 11:04 AM EST -----  Urine culture only showing mixed contaminants. As she is symptomatic and recent culture prior to this was positive, can complete abx as prescribed  
----- Message from Carlee Birch PA-C sent at 12/19/2024 11:04 AM EST -----  Urine culture only showing mixed contaminants. As she is symptomatic and recent culture prior to this was positive, can complete abx as prescribed  
Left a message for pt to call us back for results.  
[facility] providing office number    If pt calls back please relay Carlee GUZMAN message,  Urine culture only showing mixed contaminants. As she is symptomatic and recent culture prior to this was positive, can complete abx as prescribed  ----- Message from Carlee Birch PA-C sent at 12/19/2024 11:04 AM EST -----  Urine culture only showing mixed contaminants. As she is symptomatic and recent culture prior to this was positive, can complete abx as prescribed  
Patient will perform bed mobility with supervision x 1, in 4 weeks.

## 2025-01-04 ENCOUNTER — APPOINTMENT (EMERGENCY)
Dept: RADIOLOGY | Facility: HOSPITAL | Age: 80
DRG: 871 | End: 2025-01-04
Payer: COMMERCIAL

## 2025-01-04 ENCOUNTER — HOSPITAL ENCOUNTER (INPATIENT)
Facility: HOSPITAL | Age: 80
LOS: 3 days | Discharge: HOME HEALTH | DRG: 871 | End: 2025-01-08
Attending: EMERGENCY MEDICINE
Payer: COMMERCIAL

## 2025-01-04 DIAGNOSIS — J20.8 ACUTE VIRAL BRONCHITIS: ICD-10-CM

## 2025-01-04 DIAGNOSIS — J10.1 INFLUENZA B: Primary | ICD-10-CM

## 2025-01-04 DIAGNOSIS — J44.1 ACUTE EXACERBATION OF COPD WITH ASTHMA (HCC): ICD-10-CM

## 2025-01-04 PROBLEM — Z86.711 HISTORY OF PULMONARY EMBOLISM: Status: ACTIVE | Noted: 2025-01-04

## 2025-01-04 PROBLEM — J96.01 ACUTE RESPIRATORY FAILURE WITH HYPOXIA (HCC): Status: ACTIVE | Noted: 2025-01-04

## 2025-01-04 PROBLEM — N28.9 LESION OF LEFT NATIVE KIDNEY: Status: ACTIVE | Noted: 2025-01-04

## 2025-01-04 LAB
ALBUMIN SERPL BCG-MCNC: 4 G/DL (ref 3.5–5)
ALP SERPL-CCNC: 83 U/L (ref 34–104)
ALT SERPL W P-5'-P-CCNC: 7 U/L (ref 7–52)
ANION GAP SERPL CALCULATED.3IONS-SCNC: 10 MMOL/L (ref 4–13)
APTT PPP: 27 SECONDS (ref 23–34)
AST SERPL W P-5'-P-CCNC: 14 U/L (ref 13–39)
ATRIAL RATE: 108 BPM
BASOPHILS # BLD AUTO: 0.05 THOUSANDS/ΜL (ref 0–0.1)
BASOPHILS NFR BLD AUTO: 1 % (ref 0–1)
BILIRUB SERPL-MCNC: 0.5 MG/DL (ref 0.2–1)
BNP SERPL-MCNC: 79 PG/ML (ref 0–100)
BUN SERPL-MCNC: 21 MG/DL (ref 5–25)
CALCIUM SERPL-MCNC: 9 MG/DL (ref 8.4–10.2)
CARDIAC TROPONIN I PNL SERPL HS: 8 NG/L (ref ?–50)
CHLORIDE SERPL-SCNC: 103 MMOL/L (ref 96–108)
CO2 SERPL-SCNC: 26 MMOL/L (ref 21–32)
CREAT SERPL-MCNC: 0.69 MG/DL (ref 0.6–1.3)
EOSINOPHIL # BLD AUTO: 0.05 THOUSAND/ΜL (ref 0–0.61)
EOSINOPHIL NFR BLD AUTO: 1 % (ref 0–6)
ERYTHROCYTE [DISTWIDTH] IN BLOOD BY AUTOMATED COUNT: 14.6 % (ref 11.6–15.1)
FLUAV AG UPPER RESP QL IA.RAPID: NEGATIVE
FLUBV AG UPPER RESP QL IA.RAPID: POSITIVE
GFR SERPL CREATININE-BSD FRML MDRD: 81 ML/MIN/1.73SQ M
GLUCOSE SERPL-MCNC: 99 MG/DL (ref 65–140)
HCT VFR BLD AUTO: 37.5 % (ref 34.8–46.1)
HGB BLD-MCNC: 12.2 G/DL (ref 11.5–15.4)
IMM GRANULOCYTES # BLD AUTO: 0.02 THOUSAND/UL (ref 0–0.2)
IMM GRANULOCYTES NFR BLD AUTO: 0 % (ref 0–2)
INR PPP: 0.87 (ref 0.85–1.19)
LACTATE SERPL-SCNC: 1.1 MMOL/L (ref 0.5–2)
LYMPHOCYTES # BLD AUTO: 0.77 THOUSANDS/ΜL (ref 0.6–4.47)
LYMPHOCYTES NFR BLD AUTO: 12 % (ref 14–44)
MCH RBC QN AUTO: 30.9 PG (ref 26.8–34.3)
MCHC RBC AUTO-ENTMCNC: 32.5 G/DL (ref 31.4–37.4)
MCV RBC AUTO: 95 FL (ref 82–98)
MONOCYTES # BLD AUTO: 0.53 THOUSAND/ΜL (ref 0.17–1.22)
MONOCYTES NFR BLD AUTO: 9 % (ref 4–12)
NEUTROPHILS # BLD AUTO: 4.85 THOUSANDS/ΜL (ref 1.85–7.62)
NEUTS SEG NFR BLD AUTO: 77 % (ref 43–75)
NRBC BLD AUTO-RTO: 0 /100 WBCS
P AXIS: 77 DEGREES
PLATELET # BLD AUTO: 227 THOUSANDS/UL (ref 149–390)
PMV BLD AUTO: 9.3 FL (ref 8.9–12.7)
POTASSIUM SERPL-SCNC: 3.4 MMOL/L (ref 3.5–5.3)
PR INTERVAL: 158 MS
PROCALCITONIN SERPL-MCNC: <0.05 NG/ML
PROT SERPL-MCNC: 7 G/DL (ref 6.4–8.4)
PROTHROMBIN TIME: 12.5 SECONDS (ref 12.3–15)
QRS AXIS: 70 DEGREES
QRSD INTERVAL: 78 MS
QT INTERVAL: 330 MS
QTC INTERVAL: 442 MS
RBC # BLD AUTO: 3.95 MILLION/UL (ref 3.81–5.12)
SARS-COV+SARS-COV-2 AG RESP QL IA.RAPID: NEGATIVE
SODIUM SERPL-SCNC: 139 MMOL/L (ref 135–147)
T WAVE AXIS: 83 DEGREES
VENTRICULAR RATE: 108 BPM
WBC # BLD AUTO: 6.27 THOUSAND/UL (ref 4.31–10.16)

## 2025-01-04 PROCEDURE — 93005 ELECTROCARDIOGRAM TRACING: CPT

## 2025-01-04 PROCEDURE — 94640 AIRWAY INHALATION TREATMENT: CPT

## 2025-01-04 PROCEDURE — 83880 ASSAY OF NATRIURETIC PEPTIDE: CPT | Performed by: EMERGENCY MEDICINE

## 2025-01-04 PROCEDURE — 87040 BLOOD CULTURE FOR BACTERIA: CPT | Performed by: EMERGENCY MEDICINE

## 2025-01-04 PROCEDURE — 85610 PROTHROMBIN TIME: CPT | Performed by: EMERGENCY MEDICINE

## 2025-01-04 PROCEDURE — 99285 EMERGENCY DEPT VISIT HI MDM: CPT

## 2025-01-04 PROCEDURE — 96374 THER/PROPH/DIAG INJ IV PUSH: CPT

## 2025-01-04 PROCEDURE — 83605 ASSAY OF LACTIC ACID: CPT | Performed by: EMERGENCY MEDICINE

## 2025-01-04 PROCEDURE — 99223 1ST HOSP IP/OBS HIGH 75: CPT | Performed by: STUDENT IN AN ORGANIZED HEALTH CARE EDUCATION/TRAINING PROGRAM

## 2025-01-04 PROCEDURE — 85730 THROMBOPLASTIN TIME PARTIAL: CPT | Performed by: EMERGENCY MEDICINE

## 2025-01-04 PROCEDURE — 36415 COLL VENOUS BLD VENIPUNCTURE: CPT | Performed by: EMERGENCY MEDICINE

## 2025-01-04 PROCEDURE — 71045 X-RAY EXAM CHEST 1 VIEW: CPT

## 2025-01-04 PROCEDURE — 94760 N-INVAS EAR/PLS OXIMETRY 1: CPT

## 2025-01-04 PROCEDURE — 84484 ASSAY OF TROPONIN QUANT: CPT | Performed by: EMERGENCY MEDICINE

## 2025-01-04 PROCEDURE — 84145 PROCALCITONIN (PCT): CPT | Performed by: EMERGENCY MEDICINE

## 2025-01-04 PROCEDURE — 87811 SARS-COV-2 COVID19 W/OPTIC: CPT | Performed by: EMERGENCY MEDICINE

## 2025-01-04 PROCEDURE — 85025 COMPLETE CBC W/AUTO DIFF WBC: CPT | Performed by: EMERGENCY MEDICINE

## 2025-01-04 PROCEDURE — 99285 EMERGENCY DEPT VISIT HI MDM: CPT | Performed by: EMERGENCY MEDICINE

## 2025-01-04 PROCEDURE — 80053 COMPREHEN METABOLIC PANEL: CPT | Performed by: EMERGENCY MEDICINE

## 2025-01-04 PROCEDURE — 87804 INFLUENZA ASSAY W/OPTIC: CPT | Performed by: EMERGENCY MEDICINE

## 2025-01-04 PROCEDURE — 94664 DEMO&/EVAL PT USE INHALER: CPT

## 2025-01-04 RX ORDER — ALBUTEROL SULFATE 0.83 MG/ML
5 SOLUTION RESPIRATORY (INHALATION) ONCE
Status: COMPLETED | OUTPATIENT
Start: 2025-01-04 | End: 2025-01-04

## 2025-01-04 RX ORDER — LEVALBUTEROL INHALATION SOLUTION 1.25 MG/3ML
1.25 SOLUTION RESPIRATORY (INHALATION)
Status: DISCONTINUED | OUTPATIENT
Start: 2025-01-05 | End: 2025-01-08 | Stop reason: HOSPADM

## 2025-01-04 RX ORDER — SODIUM CHLORIDE 9 MG/ML
100 INJECTION, SOLUTION INTRAVENOUS CONTINUOUS
Status: DISPENSED | OUTPATIENT
Start: 2025-01-04 | End: 2025-01-05

## 2025-01-04 RX ORDER — BENZONATATE 100 MG/1
100 CAPSULE ORAL 3 TIMES DAILY
Status: DISCONTINUED | OUTPATIENT
Start: 2025-01-04 | End: 2025-01-08 | Stop reason: HOSPADM

## 2025-01-04 RX ORDER — CARBAMAZEPINE 200 MG/1
200 TABLET, EXTENDED RELEASE ORAL 2 TIMES DAILY
COMMUNITY

## 2025-01-04 RX ORDER — ALBUTEROL SULFATE 90 UG/1
2 INHALANT RESPIRATORY (INHALATION) EVERY 4 HOURS PRN
Status: DISCONTINUED | OUTPATIENT
Start: 2025-01-04 | End: 2025-01-08 | Stop reason: HOSPADM

## 2025-01-04 RX ORDER — CARBAMAZEPINE 100 MG/1
200 TABLET, EXTENDED RELEASE ORAL 2 TIMES DAILY
Status: DISCONTINUED | OUTPATIENT
Start: 2025-01-04 | End: 2025-01-08 | Stop reason: HOSPADM

## 2025-01-04 RX ORDER — OSELTAMIVIR PHOSPHATE 75 MG/1
75 CAPSULE ORAL EVERY 12 HOURS SCHEDULED
Status: DISCONTINUED | OUTPATIENT
Start: 2025-01-04 | End: 2025-01-04

## 2025-01-04 RX ORDER — METHYLPREDNISOLONE SODIUM SUCCINATE 125 MG/2ML
125 INJECTION, POWDER, LYOPHILIZED, FOR SOLUTION INTRAMUSCULAR; INTRAVENOUS ONCE
Status: COMPLETED | OUTPATIENT
Start: 2025-01-04 | End: 2025-01-04

## 2025-01-04 RX ORDER — ENOXAPARIN SODIUM 100 MG/ML
40 INJECTION SUBCUTANEOUS DAILY
Status: DISCONTINUED | OUTPATIENT
Start: 2025-01-05 | End: 2025-01-08 | Stop reason: HOSPADM

## 2025-01-04 RX ORDER — ACETAMINOPHEN 325 MG/1
650 TABLET ORAL EVERY 8 HOURS PRN
Status: DISCONTINUED | OUTPATIENT
Start: 2025-01-04 | End: 2025-01-08 | Stop reason: HOSPADM

## 2025-01-04 RX ORDER — OSELTAMIVIR PHOSPHATE 30 MG/1
30 CAPSULE ORAL EVERY 12 HOURS SCHEDULED
Status: DISCONTINUED | OUTPATIENT
Start: 2025-01-04 | End: 2025-01-08 | Stop reason: HOSPADM

## 2025-01-04 RX ORDER — GUAIFENESIN/DEXTROMETHORPHAN 100-10MG/5
10 SYRUP ORAL EVERY 4 HOURS PRN
Status: DISCONTINUED | OUTPATIENT
Start: 2025-01-04 | End: 2025-01-04

## 2025-01-04 RX ORDER — UBIDECARENONE 75 MG
100 CAPSULE ORAL DAILY
COMMUNITY

## 2025-01-04 RX ORDER — ASPIRIN 81 MG/1
81 TABLET ORAL DAILY
COMMUNITY

## 2025-01-04 RX ORDER — ACETAMINOPHEN 325 MG/1
975 TABLET ORAL ONCE
Status: COMPLETED | OUTPATIENT
Start: 2025-01-04 | End: 2025-01-04

## 2025-01-04 RX ORDER — IPRATROPIUM BROMIDE AND ALBUTEROL SULFATE 2.5; .5 MG/3ML; MG/3ML
3 SOLUTION RESPIRATORY (INHALATION)
Status: DISCONTINUED | OUTPATIENT
Start: 2025-01-04 | End: 2025-01-04

## 2025-01-04 RX ORDER — ASPIRIN 81 MG/1
81 TABLET ORAL DAILY
Status: DISCONTINUED | OUTPATIENT
Start: 2025-01-05 | End: 2025-01-08 | Stop reason: HOSPADM

## 2025-01-04 RX ADMIN — SODIUM CHLORIDE 100 ML/HR: 0.9 INJECTION, SOLUTION INTRAVENOUS at 20:58

## 2025-01-04 RX ADMIN — IPRATROPIUM BROMIDE 0.5 MG: 0.5 SOLUTION RESPIRATORY (INHALATION) at 18:25

## 2025-01-04 RX ADMIN — BENZONATATE 100 MG: 100 CAPSULE ORAL at 23:51

## 2025-01-04 RX ADMIN — ACETAMINOPHEN 975 MG: 325 TABLET ORAL at 19:32

## 2025-01-04 RX ADMIN — SODIUM CHLORIDE 500 ML: 0.9 INJECTION, SOLUTION INTRAVENOUS at 19:32

## 2025-01-04 RX ADMIN — ACETAMINOPHEN 650 MG: 325 TABLET, FILM COATED ORAL at 23:51

## 2025-01-04 RX ADMIN — CARBAMAZEPINE 200 MG: 100 TABLET, EXTENDED RELEASE ORAL at 20:58

## 2025-01-04 RX ADMIN — METHYLPREDNISOLONE SODIUM SUCCINATE 125 MG: 125 INJECTION, POWDER, FOR SOLUTION INTRAMUSCULAR; INTRAVENOUS at 18:24

## 2025-01-04 RX ADMIN — IPRATROPIUM BROMIDE AND ALBUTEROL SULFATE 3 ML: 2.5; .5 SOLUTION RESPIRATORY (INHALATION) at 20:58

## 2025-01-04 RX ADMIN — OSELTAMIVIR PHOSPHATE 30 MG: 30 CAPSULE ORAL at 20:58

## 2025-01-04 RX ADMIN — ALBUTEROL SULFATE 5 MG: 2.5 SOLUTION RESPIRATORY (INHALATION) at 18:25

## 2025-01-04 NOTE — ED PROVIDER NOTES
Time reflects when diagnosis was documented in both MDM as applicable and the Disposition within this note       Time User Action Codes Description Comment    1/4/2025  7:22 PM William Uribe Add [J10.1] Influenza B     1/4/2025  7:22 PM William Uribe Add [J44.1] Acute exacerbation of COPD with asthma (HCC)           ED Disposition       ED Disposition   Admit    Condition   Stable    Date/Time   Sat Jan 4, 2025  7:36 PM    Comment   Case was discussed with hospitalist and the patient's admission status was agreed to be Admission Status: observation status to the service of Dr. Sanchez .               Assessment & Plan       Medical Decision Making  Amount and/or Complexity of Data Reviewed  Labs: ordered.  Radiology: ordered.    Risk  OTC drugs.  Prescription drug management.  Decision regarding hospitalization.    Medical decision making 79-year-old female with a single long presents to the emergency department with wheezing air hunger some respiratory distress.  Patient proved with albuterol here treated with steroids.  Patient apparently has a nebulizer at home but has not needed to use it since her pneumonectomy.  Now presents with hypoxia difficulty breathing will require hospitalization influenza A positive.  Discussed with the patient hospitalist service.         Medications   albuterol (PROVENTIL HFA,VENTOLIN HFA) inhaler 2 puff (has no administration in time range)   carBAMazepine (TEGretol XR) 12 hr tablet 200 mg (200 mg Oral Given 1/4/25 2058)   aspirin (ECOTRIN LOW STRENGTH) EC tablet 81 mg (has no administration in time range)   acetaminophen (TYLENOL) tablet 650 mg (has no administration in time range)   sodium chloride 0.9 % infusion (100 mL/hr Intravenous New Bag 1/4/25 2058)   enoxaparin (LOVENOX) subcutaneous injection 40 mg (has no administration in time range)   oseltamivir (TAMIFLU) capsule 30 mg (30 mg Oral Given 1/4/25 2058)   levalbuterol (XOPENEX) inhalation solution 1.25 mg (has no  administration in time range)   ipratropium (ATROVENT) 0.02 % inhalation solution 0.5 mg (has no administration in time range)   albuterol inhalation solution 5 mg (5 mg Nebulization Given 1/4/25 1825)   ipratropium (ATROVENT) 0.02 % inhalation solution 0.5 mg (0.5 mg Nebulization Given 1/4/25 1825)   methylPREDNISolone sodium succinate (Solu-MEDROL) injection 125 mg (125 mg Intravenous Given 1/4/25 1824)   acetaminophen (TYLENOL) tablet 975 mg (975 mg Oral Given 1/4/25 1932)   sodium chloride 0.9 % bolus 500 mL (0 mL Intravenous Stopped 1/4/25 2058)       ED Risk Strat Scores        Diagnostic testing showed a negative BNP no sign of heart failure, troponin was negative no sign of cardiac ischemia cardiomyopathy  Electrolytes are within normal limits  Lactic acid of 1.1 no sign of severe sepsis  Influenza B was positive, discussed with the patient.  White count was normal at 6.2 no sign of inflammation hemoglobin was 12 no sign of anemia.  Chest x-ray showed a right pneumonectomy the left lung appeared normal to me I did not see a pneumonia, interpreted by me I was initial .          Identification of Seniors at Risk      Flowsheet Row Most Recent Value   (ISAR) Identification of Seniors at Risk    Before the illness or injury that brought you to the Emergency, did you need someone to help you on a regular basis? 0 Filed at: 01/04/2025 1736   In the last 24 hours, have you needed more help than usual? 0 Filed at: 01/04/2025 1736   Have you been hospitalized for one or more nights during the past 6 months? 0 Filed at: 01/04/2025 1736   In general, do you see well? 0 Filed at: 01/04/2025 1736   In general, do you have serious problems with your memory? 0 Filed at: 01/04/2025 1736   Do you take more than three different medications every day? 0 Filed at: 01/04/2025 1736   ISAR Score 0 Filed at: 01/04/2025 1736                SBIRT 22yo+      Flowsheet Row Most Recent Value   Initial Alcohol Screen: US  AUDIT-C     1. How often do you have a drink containing alcohol? 0 Filed at: 01/04/2025 1736   2. How many drinks containing alcohol do you have on a typical day you are drinking?  0 Filed at: 01/04/2025 1736   3b. FEMALE Any Age, or MALE 65+: How often do you have 4 or more drinks on one occassion? 0 Filed at: 01/04/2025 1736   Audit-C Score 0 Filed at: 01/04/2025 1736   LIA: How many times in the past year have you...    Used an illegal drug or used a prescription medication for non-medical reasons? Never Filed at: 01/04/2025 1736                            History of Present Illness       Chief Complaint   Patient presents with    Cough     Pt comes in today with suspected pneumonia. Pt states she started with a cough 3 or 4 days ago and it has gotten worse. Pt only has one lung       Past Medical History:   Diagnosis Date    Cancer (HCC)     only has the left lung     MS (multiple sclerosis) (HCC)     Neurogenic bladder     Trigeminal neuralgia       Past Surgical History:   Procedure Laterality Date    LUNG REMOVAL, TOTAL      right       Family History   Problem Relation Age of Onset    Cirrhosis Father     No Known Problems Mother     No Known Problems Daughter     No Known Problems Daughter       Social History     Tobacco Use    Smoking status: Former     Passive exposure: Past (as a teenager growing up)    Smokeless tobacco: Never   Vaping Use    Vaping status: Never Used   Substance Use Topics    Alcohol use: Yes     Comment: Occ.    Drug use: Never      E-Cigarette/Vaping    E-Cigarette Use Never User       E-Cigarette/Vaping Substances    Nicotine No     THC No     CBD No TINCTURE    Flavoring No     Other No     Unknown No       I have reviewed and agree with the history as documented.     HPI patient is a 79-year-old female history of a lung resection from cancer years ago presents emergency department now with cough and congestion that started on Wednesday.  Patient reports over the last 3 to 4 days  she has had coughing and congestion.  Patient was short of breath with minimal exertion.  Patient reports cough but no sputum production.  She reports having a nebulizer at home but never really using it post her surgery.  Patient reports dyspnea with minimal exertion shortness of breath at rest.  Feels better with her good lung up.  Past medical history of lung cancer with resection of her right lung, multiple sclerosis, neurogenic bladder trigeminal neuralgia  Family history noncontributory  Social history, former smoker no history of drug abuse    Review of Systems   Constitutional:  Negative for fever.   HENT:  Positive for congestion.    Eyes:  Negative for pain and redness.   Respiratory:  Positive for cough, shortness of breath and wheezing.    Cardiovascular:  Negative for chest pain.   Gastrointestinal:  Negative for abdominal pain and vomiting.           Objective       ED Triage Vitals   Temperature Pulse Blood Pressure Respirations SpO2 Patient Position - Orthostatic VS   01/04/25 1734 01/04/25 1734 01/04/25 1734 01/04/25 1734 01/04/25 1734 01/04/25 1734   98.2 °F (36.8 °C) (!) 110 139/91 22 90 % Sitting      Temp Source Heart Rate Source BP Location FiO2 (%) Pain Score    01/04/25 1734 01/04/25 1734 01/04/25 1734 -- 01/04/25 2311    Temporal Monitor Left arm  No Pain      Vitals      Date and Time Temp Pulse SpO2 Resp BP Pain Score FACES Pain Rating User   01/05/25 0940 -- -- 90 % -- -- No Pain -- FS   01/05/25 0758 98 °F (36.7 °C) 108 95 % -- 127/71 -- -- DII   01/05/25 0713 -- -- 95 % -- -- -- -- SH   01/04/25 2351 -- -- -- -- -- Med Not Given for Pain - for MAR use only -- [facility]   01/04/25 2311 -- -- -- 18 -- No Pain -- [facility]   01/04/25 2311 100.4 °F (38 °C) 122 95 % -- 138/70 -- -- DII   01/04/25 2311 -- -- -- -- -- No Pain -- [facility]   01/04/25 2200 -- 115 99 % 19 117/56 -- -- [facility]   01/04/25 2100 -- 112 97 % 21 118/67 -- -- [facility]   01/04/25 2000 -- 116 96 % 22 120/54 -- -- [facility]   01/04/25 1918 -- 116 -- -- --  -- -- [facility]   01/04/25 1915 -- 149 Attending notified 96 % 21 125/67 -- -- [facility]   01/04/25 1830 -- 104 99 % 20 -- -- -- [facility]   01/04/25 1734 98.2 °F (36.8 °C) 110 90 % 22 139/91 -- -- [facility]            Physical Exam  Vitals and nursing note reviewed.   Constitutional:       Appearance: She is well-developed.   HENT:      Head: Normocephalic.      Right Ear: External ear normal.      Left Ear: External ear normal.      Nose: Nose normal.      Mouth/Throat:      Mouth: Mucous membranes are moist.      Pharynx: Oropharynx is clear.   Eyes:      General: Lids are normal.      Extraocular Movements: Extraocular movements intact.      Pupils: Pupils are equal, round, and reactive to light.   Cardiovascular:      Rate and Rhythm: Normal rate and regular rhythm.      Pulses: Normal pulses.      Heart sounds: Normal heart sounds.   Pulmonary:      Effort: Respiratory distress present.      Comments: Presents with some mild respiratory distress, hypoxic on room air, no breath sounds over the right lung which was removed, left lung shows diffuse wheezing.  There was some air hunger and accessory muscle use again patient hypoxic on room air pulse oximetry in the 80s.  Abdominal:      General: Abdomen is flat. Bowel sounds are normal.   Musculoskeletal:         General: No deformity. Normal range of motion.      Cervical back: Normal range of motion and neck supple.   Skin:     General: Skin is warm and dry.   Neurological:      Mental Status: She is alert and oriented to person, place, and time.   Psychiatric:         Mood and Affect: Mood normal.         Results Reviewed       Procedure Component Value Units Date/Time    Blood culture #1 [[id number]] Collected: 01/04/25 1817    Lab Status: Preliminary result Specimen: Blood from Arm, Right Updated: 01/05/25 0328     Blood Culture Received in Microbiology Lab. Culture in Progress.    Blood culture #2 [[id number]] Collected: 01/04/25 1817    Lab Status: Preliminary result Specimen: Blood  from Arm, Right Updated: 01/05/25 0326     Blood Culture Received in Microbiology Lab. Culture in Progress.    Procalcitonin [[id number]]  (Normal) Collected: 01/04/25 1817    Lab Status: Final result Specimen: Blood from Arm, Right Updated: 01/04/25 1857     Procalcitonin <0.05 ng/ml     B-Type Natriuretic Peptide(BNP) [[id number]]  (Normal) Collected: 01/04/25 1817    Lab Status: Final result Specimen: Blood from Arm, Right Updated: 01/04/25 1854     BNP 79 pg/mL     HS Troponin 0hr (reflex protocol) [[id number]]  (Normal) Collected: 01/04/25 1817    Lab Status: Final result Specimen: Blood from Arm, Right Updated: 01/04/25 1852     hs TnI 0hr 8 ng/L     Comprehensive metabolic panel [[id number]]  (Abnormal) Collected: 01/04/25 1817    Lab Status: Final result Specimen: Blood from Arm, Right Updated: 01/04/25 1844     Sodium 139 mmol/L      Potassium 3.4 mmol/L      Chloride 103 mmol/L      CO2 26 mmol/L      ANION GAP 10 mmol/L      BUN 21 mg/dL      Creatinine 0.69 mg/dL      Glucose 99 mg/dL      Calcium 9.0 mg/dL      AST 14 U/L      ALT 7 U/L      Alkaline Phosphatase 83 U/L      Total Protein 7.0 g/dL      Albumin 4.0 g/dL      Total Bilirubin 0.50 mg/dL      eGFR 81 ml/min/1.73sq m     Narrative:      National Kidney Disease Foundation guidelines for Chronic Kidney Disease (CKD):     Stage 1 with normal or high GFR (GFR > 90 mL/min/1.73 square meters)    Stage 2 Mild CKD (GFR = 60-89 mL/min/1.73 square meters)    Stage 3A Moderate CKD (GFR = 45-59 mL/min/1.73 square meters)    Stage 3B Moderate CKD (GFR = 30-44 mL/min/1.73 square meters)    Stage 4 Severe CKD (GFR = 15-29 mL/min/1.73 square meters)    Stage 5 End Stage CKD (GFR <15 mL/min/1.73 square meters)  Note: GFR calculation is accurate only with a steady state creatinine    Lactic acid [[id number]]  (Normal) Collected: 01/04/25 1817    Lab Status: Final result Specimen: Blood from Arm, Right Updated: 01/04/25 1844     LACTIC ACID 1.1 mmol/L     Narrative:       Result may be elevated if tourniquet was used during collection.    Protime-INR [[id number]]  (Normal) Collected: 01/04/25 1817    Lab Status: Final result Specimen: Blood from Arm, Right Updated: 01/04/25 1843     Protime 12.5 seconds      INR 0.87    Narrative:      INR Therapeutic Range    Indication                                             INR Range      Atrial Fibrillation                                               2.0-3.0  Hypercoagulable State                                    2.0.2.3  Left Ventricular Asist Device                            2.0-3.0  Mechanical Heart Valve                                  -    Aortic(with afib, MI, embolism, HF, LA enlargement,    and/or coagulopathy)                                     2.0-3.0 (2.5-3.5)     Mitral                                                             2.5-3.5  Prosthetic/Bioprosthetic Heart Valve               2.0-3.0  Venous thromboembolism (VTE: VT, PE        2.0-3.0    APTT [[id number]]  (Normal) Collected: 01/04/25 1817    Lab Status: Final result Specimen: Blood from Arm, Right Updated: 01/04/25 1843     PTT 27 seconds     FLU/COVID Rapid Antigen (30 min. TAT) - Preferred screening test in ED [[id number]]  (Abnormal) Collected: 01/04/25 1817    Lab Status: Final result Specimen: Nares from Nose Updated: 01/04/25 1842     SARS COV Rapid Antigen Negative     Influenza A Rapid Antigen Negative     Influenza B Rapid Antigen Positive    Narrative:      This test has been performed using the Quidel Selena 2 FLU+SARS Antigen test under the Emergency Use Authorization (EUA). This test has been validated by the  and verified by the performing laboratory. The Selena uses lateral flow immunofluorescent sandwich assay to detect SARS-COV, Influenza A and Influenza B Antigen.     The Quidel Selena 2 SARS Antigen test does not differentiate between SARS-CoV and SARS-CoV-2.     Negative results are presumptive and may be confirmed with a  molecular assay, if necessary, for patient management. Negative results do not rule out SARS-CoV-2 or influenza infection and should not be used as the sole basis for treatment or patient management decisions. A negative test result may occur if the level of antigen in a sample is below the limit of detection of this test.     Positive results are indicative of the presence of viral antigens, but do not rule out bacterial infection or co-infection with other viruses.     All test results should be used as an adjunct to clinical observations and other information available to the provider.    FOR PEDIATRIC PATIENTS - copy/paste COVID Guidelines URL to browser: [email or web address]    CBC and differential [[id number]]  (Abnormal) Collected: 01/04/25 1817    Lab Status: Final result Specimen: Blood from Arm, Right Updated: 01/04/25 1826     WBC 6.27 Thousand/uL      RBC 3.95 Million/uL      Hemoglobin 12.2 g/dL      Hematocrit 37.5 %      MCV 95 fL      MCH 30.9 pg      MCHC 32.5 g/dL      RDW 14.6 %      MPV 9.3 fL      Platelets 227 Thousands/uL      nRBC 0 /100 WBCs      Segmented % 77 %      Immature Grans % 0 %      Lymphocytes % 12 %      Monocytes % 9 %      Eosinophils Relative 1 %      Basophils Relative 1 %      Absolute Neutrophils 4.85 Thousands/µL      Absolute Immature Grans 0.02 Thousand/uL      Absolute Lymphocytes 0.77 Thousands/µL      Absolute Monocytes 0.53 Thousand/µL      Eosinophils Absolute 0.05 Thousand/µL      Basophils Absolute 0.05 Thousands/µL             XR chest 1 view portable   Final Interpretation by Carlyn Wright MD (01/04 1858)      No acute cardiopulmonary disease.      Right pneumonectomy.      Workstation performed: [id number]             Procedures    ED Medication and Procedure Management   Prior to Admission Medications   Prescriptions Last Dose Informant Patient Reported? Taking?   Cholecalciferol (VITAMIN D3) 1,000  units tablet   Yes Yes   Sig: Take 1,000 Units by mouth daily   albuterol (PROVENTIL HFA,VENTOLIN HFA) 90 mcg/act inhaler Not Taking Self Yes No   Sig: Inhale 2 puffs every 6 (six) hours as needed   Patient not taking: Reported on 1/4/2025   aspirin (ECOTRIN LOW STRENGTH) 81 mg EC tablet   Yes Yes   Sig: Take 81 mg by mouth daily   carBAMazepine (TEGretol XR) 200 mg 12 hr tablet   Yes Yes   Sig: Take 200 mg by mouth 2 (two) times a day   cyanocobalamin (VITAMIN B-12) 100 mcg tablet   Yes Yes   Sig: Take 100 mcg by mouth daily   estradiol (ESTRACE VAGINAL) 0.1 mg/g vaginal cream Not Taking Self No No   Sig: Apply pea sized amount around urethra and inside vaginal opening 3 times per week   Patient not taking: Reported on 1/4/2025   triamcinolone (KENALOG) 0.1 % ointment  Self Yes No   Sig: PLEASE SEE ATTACHED FOR DETAILED DIRECTIONS      Facility-Administered Medications: None     Current Discharge Medication List        CONTINUE these medications which have NOT CHANGED    Details   aspirin (ECOTRIN LOW STRENGTH) 81 mg EC tablet Take 81 mg by mouth daily      carBAMazepine (TEGretol XR) 200 mg 12 hr tablet Take 200 mg by mouth 2 (two) times a day      Cholecalciferol (VITAMIN D3) 1,000 units tablet Take 1,000 Units by mouth daily      cyanocobalamin (VITAMIN B-12) 100 mcg tablet Take 100 mcg by mouth daily      albuterol (PROVENTIL HFA,VENTOLIN HFA) 90 mcg/act inhaler Inhale 2 puffs every 6 (six) hours as needed      estradiol (ESTRACE VAGINAL) 0.1 mg/g vaginal cream Apply pea sized amount around urethra and inside vaginal opening 3 times per week  Qty: 42.5 g, Refills: 2    Associated Diagnoses: Frequent UTI      triamcinolone (KENALOG) 0.1 % ointment PLEASE SEE ATTACHED FOR DETAILED DIRECTIONS           No discharge procedures on file.  ED SEPSIS DOCUMENTATION   Time reflects when diagnosis was documented in both MDM as applicable and the Disposition within this note       Time User Action Codes Description Comment     1/4/2025  7:22 PM William Uribe [J10.1] Influenza B     1/4/2025  7:22 PM William Uribe [J44.1] Acute exacerbation of COPD with asthma (HCC)                  William Uribe MD  01/05/25 1008

## 2025-01-05 LAB
ALBUMIN SERPL BCG-MCNC: 3.5 G/DL (ref 3.5–5)
ALP SERPL-CCNC: 75 U/L (ref 34–104)
ALT SERPL W P-5'-P-CCNC: 7 U/L (ref 7–52)
ANION GAP SERPL CALCULATED.3IONS-SCNC: 5 MMOL/L (ref 4–13)
AST SERPL W P-5'-P-CCNC: 14 U/L (ref 13–39)
ATRIAL RATE: 105 BPM
BASOPHILS # BLD AUTO: 0.04 THOUSANDS/ΜL (ref 0–0.1)
BASOPHILS NFR BLD AUTO: 1 % (ref 0–1)
BILIRUB SERPL-MCNC: 0.39 MG/DL (ref 0.2–1)
BUN SERPL-MCNC: 22 MG/DL (ref 5–25)
CALCIUM SERPL-MCNC: 8.7 MG/DL (ref 8.4–10.2)
CHLORIDE SERPL-SCNC: 105 MMOL/L (ref 96–108)
CO2 SERPL-SCNC: 29 MMOL/L (ref 21–32)
CREAT SERPL-MCNC: 0.78 MG/DL (ref 0.6–1.3)
EOSINOPHIL # BLD AUTO: 0.06 THOUSAND/ΜL (ref 0–0.61)
EOSINOPHIL NFR BLD AUTO: 1 % (ref 0–6)
ERYTHROCYTE [DISTWIDTH] IN BLOOD BY AUTOMATED COUNT: 14.8 % (ref 11.6–15.1)
GFR SERPL CREATININE-BSD FRML MDRD: 71 ML/MIN/1.73SQ M
GLUCOSE P FAST SERPL-MCNC: 117 MG/DL (ref 65–99)
GLUCOSE SERPL-MCNC: 117 MG/DL (ref 65–140)
HCT VFR BLD AUTO: 33.3 % (ref 34.8–46.1)
HGB BLD-MCNC: 10.9 G/DL (ref 11.5–15.4)
IMM GRANULOCYTES # BLD AUTO: 0.01 THOUSAND/UL (ref 0–0.2)
IMM GRANULOCYTES NFR BLD AUTO: 0 % (ref 0–2)
LYMPHOCYTES # BLD AUTO: 0.84 THOUSANDS/ΜL (ref 0.6–4.47)
LYMPHOCYTES NFR BLD AUTO: 17 % (ref 14–44)
MCH RBC QN AUTO: 31 PG (ref 26.8–34.3)
MCHC RBC AUTO-ENTMCNC: 32.7 G/DL (ref 31.4–37.4)
MCV RBC AUTO: 95 FL (ref 82–98)
MONOCYTES # BLD AUTO: 0.47 THOUSAND/ΜL (ref 0.17–1.22)
MONOCYTES NFR BLD AUTO: 10 % (ref 4–12)
NEUTROPHILS # BLD AUTO: 3.48 THOUSANDS/ΜL (ref 1.85–7.62)
NEUTS SEG NFR BLD AUTO: 71 % (ref 43–75)
NRBC BLD AUTO-RTO: 0 /100 WBCS
P AXIS: 60 DEGREES
PLATELET # BLD AUTO: 198 THOUSANDS/UL (ref 149–390)
PMV BLD AUTO: 9.1 FL (ref 8.9–12.7)
POTASSIUM SERPL-SCNC: 3.3 MMOL/L (ref 3.5–5.3)
PR INTERVAL: 174 MS
PROT SERPL-MCNC: 6.1 G/DL (ref 6.4–8.4)
QRS AXIS: 51 DEGREES
QRSD INTERVAL: 86 MS
QT INTERVAL: 344 MS
QTC INTERVAL: 454 MS
RBC # BLD AUTO: 3.52 MILLION/UL (ref 3.81–5.12)
SODIUM SERPL-SCNC: 139 MMOL/L (ref 135–147)
T WAVE AXIS: 69 DEGREES
VENTRICULAR RATE: 105 BPM
WBC # BLD AUTO: 4.9 THOUSAND/UL (ref 4.31–10.16)

## 2025-01-05 PROCEDURE — 93005 ELECTROCARDIOGRAM TRACING: CPT

## 2025-01-05 PROCEDURE — 94640 AIRWAY INHALATION TREATMENT: CPT

## 2025-01-05 PROCEDURE — 94760 N-INVAS EAR/PLS OXIMETRY 1: CPT

## 2025-01-05 PROCEDURE — 99232 SBSQ HOSP IP/OBS MODERATE 35: CPT

## 2025-01-05 PROCEDURE — 85025 COMPLETE CBC W/AUTO DIFF WBC: CPT | Performed by: STUDENT IN AN ORGANIZED HEALTH CARE EDUCATION/TRAINING PROGRAM

## 2025-01-05 PROCEDURE — 93010 ELECTROCARDIOGRAM REPORT: CPT | Performed by: STUDENT IN AN ORGANIZED HEALTH CARE EDUCATION/TRAINING PROGRAM

## 2025-01-05 PROCEDURE — 80053 COMPREHEN METABOLIC PANEL: CPT | Performed by: STUDENT IN AN ORGANIZED HEALTH CARE EDUCATION/TRAINING PROGRAM

## 2025-01-05 RX ORDER — HYDROCODONE BITARTRATE AND HOMATROPINE METHYLBROMIDE ORAL SOLUTION 5; 1.5 MG/5ML; MG/5ML
5 LIQUID ORAL EVERY 4 HOURS PRN
Refills: 0 | Status: DISCONTINUED | OUTPATIENT
Start: 2025-01-05 | End: 2025-01-08 | Stop reason: HOSPADM

## 2025-01-05 RX ORDER — POTASSIUM CHLORIDE 1500 MG/1
40 TABLET, EXTENDED RELEASE ORAL ONCE
Status: COMPLETED | OUTPATIENT
Start: 2025-01-05 | End: 2025-01-05

## 2025-01-05 RX ORDER — KETOROLAC TROMETHAMINE 30 MG/ML
15 INJECTION, SOLUTION INTRAMUSCULAR; INTRAVENOUS EVERY 6 HOURS PRN
Status: DISCONTINUED | OUTPATIENT
Start: 2025-01-05 | End: 2025-01-05

## 2025-01-05 RX ORDER — KETOROLAC TROMETHAMINE 30 MG/ML
15 INJECTION, SOLUTION INTRAMUSCULAR; INTRAVENOUS EVERY 6 HOURS PRN
Status: DISPENSED | OUTPATIENT
Start: 2025-01-05 | End: 2025-01-07

## 2025-01-05 RX ADMIN — IPRATROPIUM BROMIDE 0.5 MG: 0.5 SOLUTION RESPIRATORY (INHALATION) at 19:47

## 2025-01-05 RX ADMIN — IPRATROPIUM BROMIDE 0.5 MG: 0.5 SOLUTION RESPIRATORY (INHALATION) at 07:13

## 2025-01-05 RX ADMIN — HYDROCODONE BITARTRATE AND HOMATROPINE METHYLBROMIDE ORAL SOLUTION 5 ML: 5; 1.5 LIQUID ORAL at 13:16

## 2025-01-05 RX ADMIN — HYDROCODONE BITARTRATE AND HOMATROPINE METHYLBROMIDE ORAL SOLUTION 5 ML: 5; 1.5 LIQUID ORAL at 22:19

## 2025-01-05 RX ADMIN — POTASSIUM CHLORIDE 40 MEQ: 1500 TABLET, EXTENDED RELEASE ORAL at 09:36

## 2025-01-05 RX ADMIN — LEVALBUTEROL HYDROCHLORIDE 1.25 MG: 1.25 SOLUTION RESPIRATORY (INHALATION) at 07:13

## 2025-01-05 RX ADMIN — CARBAMAZEPINE 200 MG: 100 TABLET, EXTENDED RELEASE ORAL at 09:37

## 2025-01-05 RX ADMIN — OSELTAMIVIR PHOSPHATE 30 MG: 30 CAPSULE ORAL at 22:19

## 2025-01-05 RX ADMIN — IPRATROPIUM BROMIDE 0.5 MG: 0.5 SOLUTION RESPIRATORY (INHALATION) at 13:25

## 2025-01-05 RX ADMIN — CARBAMAZEPINE 200 MG: 100 TABLET, EXTENDED RELEASE ORAL at 17:04

## 2025-01-05 RX ADMIN — BENZONATATE 100 MG: 100 CAPSULE ORAL at 17:00

## 2025-01-05 RX ADMIN — LEVALBUTEROL HYDROCHLORIDE 1.25 MG: 1.25 SOLUTION RESPIRATORY (INHALATION) at 19:47

## 2025-01-05 RX ADMIN — OSELTAMIVIR PHOSPHATE 30 MG: 30 CAPSULE ORAL at 09:36

## 2025-01-05 RX ADMIN — LEVALBUTEROL HYDROCHLORIDE 1.25 MG: 1.25 SOLUTION RESPIRATORY (INHALATION) at 13:25

## 2025-01-05 RX ADMIN — KETOROLAC TROMETHAMINE 15 MG: 30 INJECTION, SOLUTION INTRAMUSCULAR at 17:03

## 2025-01-05 RX ADMIN — ASPIRIN 81 MG: 81 TABLET, COATED ORAL at 09:36

## 2025-01-05 RX ADMIN — BENZONATATE 100 MG: 100 CAPSULE ORAL at 22:19

## 2025-01-05 RX ADMIN — ACETAMINOPHEN 650 MG: 325 TABLET, FILM COATED ORAL at 22:19

## 2025-01-05 RX ADMIN — ENOXAPARIN SODIUM 40 MG: 40 INJECTION SUBCUTANEOUS at 09:36

## 2025-01-05 RX ADMIN — BENZONATATE 100 MG: 100 CAPSULE ORAL at 09:36

## 2025-01-05 NOTE — PLAN OF CARE
Problem: INFECTION - ADULT  Goal: Absence or prevention of progression during hospitalization  Description: INTERVENTIONS:  - Assess and monitor for signs and symptoms of infection  - Monitor lab/diagnostic results  - Monitor all insertion sites, i.e. indwelling lines, tubes, and drains  - Monitor endotracheal if appropriate and nasal secretions for changes in amount and color  - [place] appropriate cooling/warming therapies per order  - Administer medications as ordered  - Instruct and encourage patient and family to use good hand hygiene technique  - Identify and instruct in appropriate isolation precautions for identified infection/condition  Outcome: Progressing  Goal: Absence of fever/infection during neutropenic period  Description: INTERVENTIONS:  - Monitor WBC    Outcome: Progressing     Problem: PAIN - ADULT  Goal: Verbalizes/displays adequate comfort level or baseline comfort level  Description: Interventions:  - Encourage patient to monitor pain and request assistance  - Assess pain using appropriate pain scale  - Administer analgesics based on type and severity of pain and evaluate response  - Implement non-pharmacological measures as appropriate and evaluate response  - Consider cultural and social influences on pain and pain management  - Notify physician/advanced practitioner if interventions unsuccessful or patient reports new pain  Outcome: Progressing

## 2025-01-05 NOTE — ASSESSMENT & PLAN NOTE
Patient with tachycardia and tachypnea.  Tested positive for influenza type B.  Started on Tamiflu.  Continue with IV fluids.  Pending blood culture.  Lactic acid and procalcitonin within normal range.  Will continue treatment for influenza and supportive care.  If patient develops fever and persistent sepsis consider starting antibiotics.

## 2025-01-05 NOTE — ASSESSMENT & PLAN NOTE
Presents to the ER 1//25 with cough/X 3 to 4 days  X-ray with no acute abnormalities, CBC and CMP unremarkable  Reportedly wheezing on exam, s/p methylprednisolone 125 mg in the ED plus nebs with some improvement but requiring 2 to 3 L nasal cannula oxygen    Tested positive influenza B  Tamiflu ordered, renally adjusted x 5 days   Supportive measures including Tessalon Perles, Nebs, PRN Acetaminophen , PRN NC Oxygen ordered   Follow and revaluate closely , if not improving or worsening sx , low threshold for CT scan and escalating to include ABX if clinically indicated given hx of R pneumonectomy and NSCLC

## 2025-01-05 NOTE — H&P
H&P - Hospitalist   Name: Francesca Retana 79 y.o. female I MRN: [id number]  Unit/Bed#: 2 E 253-01 I Date of Admission: 1/4/2025   Date of Service: 1/5/2025 I Hospital Day: 0     Assessment & Plan  Acute respiratory failure with hypoxia (HCC)  Presents to the ER 1//25 with cough/X 3 to 4 days  X-ray with no acute abnormalities, CBC and CMP unremarkable  Reportedly wheezing on exam, s/p methylprednisolone 125 mg in the ED plus nebs with some improvement but requiring 2 to 3 L nasal cannula oxygen    Tested positive influenza B  Tamiflu ordered, renally adjusted x 5 days   Supportive measures including Tessalon Perles, Nebs, PRN Acetaminophen , PRN NC Oxygen ordered   Follow and revaluate closely , if not improving or worsening sx , low threshold for CT scan and escalating to include ABX if clinically indicated given hx of R pneumonectomy and NSCLC     History of lung cancer  History of non-small cell lung cancer s/p right pneumonectomy November 2016, stage IIIa T4 N1 M0, with positive margins s/p adjuvant radiation with carboplatin and paclitaxel completed February 2017    Per chart review visit September 2024 with medical oncology patient reported weight loss 17 pounds and CT scan September 4, 2024 showed new 8 mm pleural-based nodule left lower lobe and a noted plan was for repeat imaging and follow-up ~  January 2025. Patient is aware and she has the CT scheduled next week as outpatient.     History of pulmonary embolism  History of large left PE s/p Eliquis  Lesion of left native kidney  CT abdomen 10/8/2024 remarkable for 1.7 cm enhancing mass left posterior midpole concerning for renal cell carcinoma was recs for urology evaluation as per radiological report.      Patient is aware and noted that she saw urology already and has plan to repeate CT scan next week and follow up with urology and medical oncology after. Patient is established with medical oncology at [facility] last seen by Mildred Noyola  BRANDY, THOMAS 9/25/2024.       VTE Pharmacologic Prophylaxis:   Moderate Risk (Score 3-4) - Pharmacological DVT Prophylaxis Ordered: enoxaparin (Lovenox).  Code Status: Level 1 - Full Code   Discussion with family: Patient declined call to .     Anticipated Length of Stay: Patient will be admitted on an inpatient basis with an anticipated length of stay of greater than 2 midnights secondary to symptomatic FLU .    History of Present Illness   Chief Complaint: SOB / Cough    Francesca Retana is a 79 y.o. female with a PMH of NSCLC s/p right pneumonectomy, hx of PE (provoked post op) on Xarelto. who presents with cough x 3-4 days and was found hypoxic in the ED requiring NC Oxygen (2-3 L) and tested + For Flu B.    Seen in the ED , she confirmed above, and noted that prior to this week she was doing fine with no resp symptoms . Above A&P , impression / findings are explained in details , and she agrees.     Importance of obtaining the planned CT CAP as was planned by oncology is explained , and she is aware and has it scheduled with follow up after with both oncology and urology, as per patient.     Home meds reviewed, full code confirmed , denies any other questions. Declined need to call and update family.         Review of Systems  - GENERAL: Negative for any nausea, vomiting, fevers, chills, or weight loss.  - HEENT: Negative for any head/Neck trauma, pain, double/blurry vision, sinusitis, rhinitis, nose bleeding.  - CARDIAC: denies chest pain ; Negative for any chest pain, palpitation, Dyspnea on exertion, peripheral edema.  - PULMONARY: positive for frequent dry cough x 3 - 4 days with SOB ; denies pleuritic pain   - GASTROINTESTINAL: Negative for any abdominal pain, N/V/D/C, blood in stool.   - GENITOURINARY: Negative for any dysuria, hematuria, incontinence.  - NEUROLOGIC: Negative for any muscle weakness, numbness/tingling, memory changes.    - MUSCULOSKELETAL: Negative for any joint pains/swelling,  limited ROM.   - INTEGUMENTARY: Negative for any rashes, cuts/ lesions.  - HEMATOLOGIC: Negative for any abnormal bruising, frequent infections or bleeding.    Historical Information   Past Medical History:   Diagnosis Date    Cancer (HCC)     only has the left lung     MS (multiple sclerosis) (HCC)     Neurogenic bladder     Trigeminal neuralgia      Past Surgical History:   Procedure Laterality Date    LUNG REMOVAL, TOTAL      right      Social History     Tobacco Use    Smoking status: Former     Passive exposure: Past (as a teenager growing up)    Smokeless tobacco: Never   Vaping Use    Vaping status: Never Used   Substance and Sexual Activity    Alcohol use: Yes     Comment: Occ.    Drug use: Never    Sexual activity: Not Currently     E-Cigarette/Vaping    E-Cigarette Use Never User      E-Cigarette/Vaping Substances    Nicotine No     THC No     CBD No TINCTURE    Flavoring No     Other No     Unknown No      Family history non-contributory  Social History:  Marital Status:    Occupation:   Patient Pre-hospital Living Situation: Home  Patient Pre-hospital Level of Mobility: walks  Patient Pre-hospital Diet Restrictions:     Meds/Allergies   I have reviewed home medications with patient personally.  Prior to Admission medications    Medication Sig Start Date End Date Taking? Authorizing Provider   albuterol (PROVENTIL HFA,VENTOLIN HFA) 90 mcg/act inhaler Inhale 2 puffs every 6 (six) hours as needed 3/11/22   Historical Provider, MD   aspirin (ECOTRIN LOW STRENGTH) 81 mg EC tablet Take 81 mg by mouth daily    Historical Provider, MD   betamethasone, augmented, (DIPROLENE) 0.05 % ointment as needed    Historical Provider, MD   carBAMazepine (TEGretol) 200 mg tablet Take 1 tablet by mouth 2 (two) times a day 3/11/22   Historical Provider, MD   Cholecalciferol 50 MCG (2000 UT) CAPS Take 2 capsules by mouth daily    Historical Provider, MD   clobetasol (TEMOVATE) 0.05 % cream PLEASE SEE ATTACHED FOR  DETAILED DIRECTIONS 7/29/24   Historical Provider, MD   cyanocobalamin (VITAMIN B-12) 100 MCG tablet Take 100 mcg by mouth daily    Historical Provider, MD   estradiol (ESTRACE VAGINAL) 0.1 mg/g vaginal cream Apply pea sized amount around urethra and inside vaginal opening 3 times per week 4/18/24   Carlee Birch PA-C   magnesium Oxide (MagOx 400) 400 mg TABS Take 400 mg by mouth 4/9/24 4/9/25  Historical Provider, MD   methenamine hippurate (HIPREX) 1 g tablet Take 1 tablet (1 g total) by mouth 2 (two) times a day with meals 10/19/24 4/17/25  MARYSE Spann   methylcellulose (Citrucel) 500 mg tablet Take 2 each by mouth  Patient not taking: Reported on 12/9/2024 4/9/24   Historical Provider, MD   nitrofurantoin (MACROBID) 100 mg capsule  11/25/24   Historical Provider, MD   Probiotic Product (PROBIOTIC PO) Take 6 Billion Cells by mouth daily.    Historical Provider, MD   triamcinolone (KENALOG) 0.1 % ointment PLEASE SEE ATTACHED FOR DETAILED DIRECTIONS 9/9/24   Historical Provider, MD     Allergies   Allergen Reactions    Sulfa Antibiotics Other (See Comments)     Forms crystals in the urine        Objective :  Temp:  [98.2 °F (36.8 °C)-100.4 °F (38 °C)] 100.4 °F (38 °C)  HR:  [104-149] 122  BP: (117-139)/(54-91) 138/70  Resp:  [18-22] 18  SpO2:  [90 %-99 %] 95 %  O2 Device: Nasal cannula  Nasal Cannula O2 Flow Rate (L/min):  [2 L/min-3 L/min] 2 L/min    Physical Exam   - GEN: Appears ill, alert and oriented x 3, pleasant and cooperative, in no acute distress  - HEENT: Anicteric, mucous membranes moist, PERRL and EOMI   - NECK: No lymphadenopathy, JVD or carotid bruits   - HEART: sinus tachycardia ~ 110 BPM , normal S1 and S2, no murmurs, clicks, gallops or rubs   - LUNGS: dry cough evident during exam ; on O2 ;  Clear to auscultation bilaterally; no wheezes, rales, or rhonchi  - ABDOMEN: Normal bowel sounds, soft, no tenderness, no distention, no organomegaly or masses felt on exam.   - EXTREMITIES:  "Peripheral pulses normal; no clubbing, cyanosis, or edema  - NEURO: No focal findings, CN II-XII are grossly intact.   - Musculoskeletal: 5/5 strength, normal ROM, no swollen or erythematous joints.   - SKIN: Normal without suspicious lesions on exposed skin      Lines/Drains:            Lab Results: I have reviewed the following results:  Results from last 7 days   Lab Units 01/04/25  1817   WBC Thousand/uL 6.27   HEMOGLOBIN g/dL 12.2   HEMATOCRIT % 37.5   PLATELETS Thousands/uL 227   SEGS PCT % 77*   LYMPHO PCT % 12*   MONO PCT % 9   EOS PCT % 1     Results from last 7 days   Lab Units 01/04/25  1817   SODIUM mmol/L 139   POTASSIUM mmol/L 3.4*   CHLORIDE mmol/L 103   CO2 mmol/L 26   BUN mg/dL 21   CREATININE mg/dL 0.69   ANION GAP mmol/L 10   CALCIUM mg/dL 9.0   ALBUMIN g/dL 4.0   TOTAL BILIRUBIN mg/dL 0.50   ALK PHOS U/L 83   ALT U/L 7   AST U/L 14   GLUCOSE RANDOM mg/dL 99     Results from last 7 days   Lab Units 01/04/25  1817   INR  0.87         No results found for: \"HGBA1C\"  Results from last 7 days   Lab Units 01/04/25  1817   LACTIC ACID mmol/L 1.1   PROCALCITONIN ng/ml <0.05       Imaging Results Review: I reviewed radiology reports from this admission including: chest xray.  Other Study Results Review: EKG was reviewed.     Administrative Statements   I have spent a total time of 50 minutes in caring for this patient on the day of the visit/encounter including Diagnostic results, Prognosis, Risks and benefits of tx options, Instructions for management, Patient and family education, Importance of tx compliance, Risk factor reductions, Impressions, Counseling / Coordination of care, Documenting in the medical record, Reviewing / ordering tests, medicine, procedures  , and Obtaining or reviewing history  .    ** Please Note: This note has been constructed using a voice recognition system. **    Daniel Christie DO   [facility]     "

## 2025-01-05 NOTE — PLAN OF CARE
Problem: PAIN - ADULT  Goal: Verbalizes/displays adequate comfort level or baseline comfort level  Description: Interventions:  - Encourage patient to monitor pain and request assistance  - Assess pain using appropriate pain scale  - Administer analgesics based on type and severity of pain and evaluate response  - Implement non-pharmacological measures as appropriate and evaluate response  - Consider cultural and social influences on pain and pain management  - Notify physician/advanced practitioner if interventions unsuccessful or patient reports new pain  Outcome: Progressing     Problem: INFECTION - ADULT  Goal: Absence or prevention of progression during hospitalization  Description: INTERVENTIONS:  - Assess and monitor for signs and symptoms of infection  - Monitor lab/diagnostic results  - Monitor all insertion sites, i.e. indwelling lines, tubes, and drains  - Monitor endotracheal if appropriate and nasal secretions for changes in amount and color  - [place] appropriate cooling/warming therapies per order  - Administer medications as ordered  - Instruct and encourage patient and family to use good hand hygiene technique  - Identify and instruct in appropriate isolation precautions for identified infection/condition  Outcome: Progressing  Goal: Absence of fever/infection during neutropenic period  Description: INTERVENTIONS:  - Monitor WBC    Outcome: Progressing     Problem: SAFETY ADULT  Goal: Patient will remain free of falls  Description: INTERVENTIONS:  - Educate patient/family on patient safety including physical limitations  - Instruct patient to call for assistance with activity   - Consult OT/PT to assist with strengthening/mobility   - Keep Call bell within reach  - Keep bed low and locked with side rails adjusted as appropriate  - Keep care items and personal belongings within reach  - Initiate and maintain comfort rounds  - Make Fall Risk Sign visible to staff  - Offer Toileting every 2 Hours,  in advance of need  - Initiate/Maintain bed alarm  - Obtain necessary fall risk management equipment: bed alarm, nonskid socks  - Apply yellow socks and bracelet for high fall risk patients  - Consider moving patient to room near nurses station  Outcome: Progressing  Goal: Maintain or return to baseline ADL function  Description: INTERVENTIONS:  -  Assess patient's ability to carry out ADLs; assess patient's baseline for ADL function and identify physical deficits which impact ability to perform ADLs (bathing, care of mouth/teeth, toileting, grooming, dressing, etc.)  - Assess/evaluate cause of self-care deficits   - Assess range of motion  - Assess patient's mobility; develop plan if impaired  - Assess patient's need for assistive devices and provide as appropriate  - Encourage maximum independence but intervene and supervise when necessary  - Involve family in performance of ADLs  - Assess for home care needs following discharge   - Consider OT consult to assist with ADL evaluation and planning for discharge  - Provide patient education as appropriate  Outcome: Progressing  Goal: Maintains/Returns to pre admission functional level  Description: INTERVENTIONS:  - Perform AM-PAC 6 Click Basic Mobility/ Daily Activity assessment daily.  - Set and communicate daily mobility goal to care team and patient/family/caregiver.   - Collaborate with rehabilitation services on mobility goals if consulted  - Perform Range of Motion 4 times a day.  - Reposition patient every 2 hours.  - Dangle patient 4 times a day  - Stand patient 4 times a day  - Ambulate patient 4 times a day  - Out of bed to chair 4 times a day   - Out of bed for meals 4 times a day  - Out of bed for toileting  - Record patient progress and toleration of activity level   Outcome: Progressing     Problem: DISCHARGE PLANNING  Goal: Discharge to home or other facility with appropriate resources  Description: INTERVENTIONS:  - Identify barriers to discharge  w/patient and caregiver  - Arrange for needed discharge resources and transportation as appropriate  - Identify discharge learning needs (meds, wound care, etc.)  - Arrange for interpretive services to assist at discharge as needed  - Refer to Case Management Department for coordinating discharge planning if the patient needs post-hospital services based on physician/advanced practitioner order or complex needs related to functional status, cognitive ability, or social support system  Outcome: Progressing     Problem: Knowledge Deficit  Goal: Patient/family/caregiver demonstrates understanding of disease process, treatment plan, medications, and discharge instructions  Description: Complete learning assessment and assess knowledge base.  Interventions:  - Provide teaching at level of understanding  - Provide teaching via preferred learning methods  Outcome: Progressing     Problem: RESPIRATORY - ADULT  Goal: Achieves optimal ventilation and oxygenation  Description: INTERVENTIONS:  - Assess for changes in respiratory status  - Assess for changes in mentation and behavior  - Position to facilitate oxygenation and minimize respiratory effort  - Oxygen administered by appropriate delivery if ordered  - Initiate smoking cessation education as indicated  - Encourage broncho-pulmonary hygiene including cough, deep breathe, Incentive Spirometry  - Assess the need for suctioning and aspirate as needed  - Assess and instruct to report SOB or any respiratory difficulty  - Respiratory Therapy support as indicated  Outcome: Progressing

## 2025-01-05 NOTE — UTILIZATION REVIEW
Initial Clinical Review    OBSERVATION   1/4  CHANGED TO IP ADMISSION  1/5  @   1319  The patient will continue to require additional inpatient hospital stay due to sepsis and acute respiratory failure in the setting of influenza type B     Admission: Date/Time/Statement:   Admission Orders (From admission, onward)       Ordered        01/04/25 1937  Place in Observation  Once                          01/05/25 1319  INPATIENT ADMISSION  Once        Transfer Service: Hospitalist   Question Answer Comment   Level of Care Med Surg    Estimated length of stay More than 2 Midnights    Certification I certify that inpatient services are medically necessary for this patient for a duration of greater than two midnights. See H&P and MD Progress Notes for additional information about the patient's course of treatment.        01/05/25 1319       ED Arrival Information       Expected   -    Arrival   1/4/2025 17:29    Acuity   Emergent              Means of arrival   Wheelchair    Escorted by   Family Member    Service   Hospitalist    Admission type   Emergency              Arrival complaint   Shortness Of Breath.Low Oxygen Level,Cough             Chief Complaint   Patient presents with    Cough     Pt comes in today with suspected pneumonia. Pt states she started with a cough 3 or 4 days ago and it has gotten worse. Pt only has one lung       Initial Presentation: 79 y.o. female presents to ED from home   with cough  for  3-4  days.  Found hypoxic  in ED and required  O2 2-3  L .  Tested positive for Flu  B.  PMH  is  NSCLC, S/P  R  pneumonectomy,  PE on  Xarelto. Admit  Observation with  Acute respiratory failure  with hypoxia  and plan is   tamiflu,  nebulizers, O2  2-3  L  and continue home meds.     1/5  IP ADMISSION  Still with significant cough.   Wheezing noted on  exam but denies  SOB.  + chills   - fever   Remains on  IVF/tamiflu.      Date:   1/6  Day 3: Has surpassed a 2nd midnight with active treatments and  services.  Still requires  O2  1 L NC  with sats  92  %.  Has not been OOB,  feels  improved.  Remain on tamiflu, continue all current meds.  F/U blood  cultures.         ED Treatment-Medication Administration from 01/04/2025 1729 to 01/04/2025 2305         Date/Time Order Dose Route Action     01/04/2025 1825 albuterol inhalation solution 5 mg 5 mg Nebulization Given     01/04/2025 1825 ipratropium (ATROVENT) 0.02 % inhalation solution 0.5 mg 0.5 mg Nebulization Given     01/04/2025 1824 methylPREDNISolone sodium succinate (Solu-MEDROL) injection 125 mg 125 mg Intravenous Given     01/04/2025 1932 acetaminophen (TYLENOL) tablet 975 mg 975 mg Oral Given     01/04/2025 1932 sodium chloride 0.9 % bolus 500 mL 500 mL Intravenous New Bag     01/04/2025 2058 ipratropium-albuterol (DUO-NEB) 0.5-2.5 mg/3 mL inhalation solution 3 mL 3 mL Nebulization Given     01/04/2025 2058 carBAMazepine (TEGretol XR) 12 hr tablet 200 mg 200 mg Oral Given     01/04/2025 2058 sodium chloride 0.9 % infusion 100 mL/hr Intravenous New Bag     01/04/2025 2058 oseltamivir (TAMIFLU) capsule 30 mg 30 mg Oral Given            Scheduled Medications:  aspirin, 81 mg, Oral, Daily  benzonatate, 100 mg, Oral, TID  carBAMazepine, 200 mg, Oral, BID  enoxaparin, 40 mg, Subcutaneous, Daily  ipratropium, 0.5 mg, Nebulization, TID  levalbuterol, 1.25 mg, Nebulization, TID  oseltamivir, 30 mg, Oral, Q12H [facility]      Continuous IV Infusions:  sodium chloride, 100 mL/hr, Intravenous, Continuous      PRN Meds:  acetaminophen, 650 mg, Oral, Q8H PRN  albuterol, 2 puff, Inhalation, Q4H PRN      ED Triage Vitals   Temperature Pulse Respirations Blood Pressure SpO2 Pain Score   01/04/25 1734 01/04/25 1734 01/04/25 1734 01/04/25 1734 01/04/25 1734 01/04/25 2311   98.2 °F (36.8 °C) (!) 110 22 139/91 90 % No Pain     Weight (last 2 days)       Date/Time Weight    01/04/25 23:11:09 59 (130)            Vital Signs (last 3 days)       Date/Time Temp Pulse Resp BP MAP (mmHg)  SpO2 Calculated FIO2 (%) - Nasal Cannula Nasal Cannula O2 Flow Rate (L/min) O2 Device Patient Position - Orthostatic VS Pain    01/04/25 2351 -- -- -- -- -- -- -- -- -- -- Med Not Given for Pain - for MAR use only    01/04/25 23:11:09 100.4 °F (38 °C) 122 18 138/70 93 95 % 28 2 L/min Nasal cannula -- No Pain    01/04/25 2311 -- -- -- -- -- -- -- -- -- -- No Pain    01/04/25 2200 -- 115 19 117/56 80 99 % 28 2 L/min Nasal cannula -- --    01/04/25 2148 -- -- -- -- -- -- 28 2 L/min Nasal cannula -- --    01/04/25 2100 -- 112 21 118/67 85 97 % 28 2 L/min Nasal cannula -- --    01/04/25 2000 -- 116 22 120/54 78 96 % 28 2 L/min Nasal cannula -- --    01/04/25 1918 -- 116 -- -- -- -- -- -- -- -- --    01/04/25 1915 -- 149 21 125/67 90 96 % 28 2 L/min Nasal cannula -- --    01/04/25 1830 -- 104 20 -- -- 99 % 32 3 L/min Nasal cannula -- --    01/04/25 1734 98.2 °F (36.8 °C) 110 22 139/91 -- 90 % -- -- None (Room air) Sitting --              Pertinent Labs/Diagnostic Test Results:   Radiology:  XR chest 1 view portable   Final Interpretation by Carlyn Wright MD (01/04 1858)      No acute cardiopulmonary disease.      Right pneumonectomy.      Workstation performed: [id number]           Cardiology:  ECG 12 lead    by Interface, Ris Results In (01/04 1737)              Results from last 7 days   Lab Units 01/05/25  0603 01/04/25  1817   WBC Thousand/uL 4.90 6.27   HEMOGLOBIN g/dL 10.9* 12.2   HEMATOCRIT % 33.3* 37.5   PLATELETS Thousands/uL 198 227   TOTAL NEUT ABS Thousands/µL 3.48 4.85         Results from last 7 days   Lab Units 01/05/25  0603 01/04/25  1817 01/02/25  1339   SODIUM mmol/L 139 139 142   POTASSIUM mmol/L 3.3* 3.4* 3.6   CHLORIDE mmol/L 105 103 101   CO2 mmol/L 29 26 31   ANION GAP mmol/L 5 10 10   BUN mg/dL 22 21 23   CREATININE mg/dL 0.78 0.69 0.76   EGFR ml/min/1.73sq m 71 81 79   CALCIUM mg/dL 8.7 9.0 8.8     Results from last 7 days   Lab Units 01/05/25  0603 01/04/25 1817 01/02/25  1339   AST U/L  14 14 13   ALT U/L 7 7 7   ALK PHOS U/L 75 83 83   TOTAL PROTEIN g/dL 6.1* 7.0 6.6   ALBUMIN g/dL 3.5 4.0 3.8   TOTAL BILIRUBIN mg/dL 0.39 0.50 0.4         Results from last 7 days   Lab Units 01/05/25  0603 01/04/25  1817 01/02/25  1339   GLUCOSE RANDOM mg/dL 117 99 84               Results from last 7 days   Lab Units 01/04/25  1817   HS TNI 0HR ng/L 8         Results from last 7 days   Lab Units 01/04/25  1817   PROTIME seconds 12.5   INR  0.87   PTT seconds 27         Results from last 7 days   Lab Units 01/04/25  1817   PROCALCITONIN ng/ml <0.05     Results from last 7 days   Lab Units 01/04/25  1817   LACTIC ACID mmol/L 1.1             Results from last 7 days   Lab Units 01/04/25  1817   BNP pg/mL 79               Results from last 7 days   Lab Units 01/04/25  1817   BLOOD CULTURE  Received in Microbiology Lab. Culture in Progress.  Received in Microbiology Lab. Culture in Progress.     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 female    Network Utilization Review Department  ATTENTION: Please call with any questions or concerns to [phone] and carefully listen to the prompts so that you are directed to the right person. All voicemails are confidential.   For Discharge needs, contact Care Management DC Support Team at [phone] opt. 2  Send all requests for admission clinical reviews, approved or denied determinations and any other requests to dedicated fax number below belonging to the campus where the patient is receiving treatment. List of dedicated fax numbers for the Facilities:  FACILITY NAME UR FAX NUMBER   ADMISSION DENIALS (Administrative/Medical Necessity) [phone]   DISCHARGE SUPPORT TEAM (NETWORK) [phone]   PARENT CHILD HEALTH (Maternity/NICU/Pediatrics) [phone]   [facility] [phone]   [facility] [phone]   [facility]  [facility] [phone]   [facility] [phone]   [facility] [phone]   [facility] [phone]   General acute hospital [phone]   [facility] [phone]   [facility] [phone]   [facility] [phone]   [facility] [phone]   [facility] [phone]

## 2025-01-05 NOTE — ASSESSMENT & PLAN NOTE
History of non-small cell lung cancer s/p right pneumonectomy November 2016, stage IIIa T4 N1 M0, with positive margins s/p adjuvant radiation with carboplatin and paclitaxel completed February 2017    Per chart review visit September 2024 with medical oncology patient reported weight loss 17 pounds and CT scan September 4, 2024 showed new 8 mm pleural-based nodule left lower lobe and a noted plan was for repeat imaging and follow-up ~  January 2025. Patient is aware and she has the CT scheduled next week as outpatient.

## 2025-01-05 NOTE — ASSESSMENT & PLAN NOTE
Presents to the ER 1//25 with cough/X 3 to 4 days  X-ray with no acute abnormalities, CBC and CMP unremarkable  Reportedly wheezing on exam, s/p methylprednisolone 125 mg in the ED plus nebs with some improvement but requiring 2 to 3 L nasal cannula oxygen    Tested positive influenza B  Tamiflu ordered, renally adjusted x 5 days   Supportive measures including Tessalon Perles, Nebs, PRN Acetaminophen , PRN NC Oxygen ordered   Patient weaned to 1 L of O2 however appears wheezy on exam.  Will continue with nebulizers  Started Hycodan as needed for cough.

## 2025-01-05 NOTE — RESPIRATORY THERAPY NOTE
RT Protocol Note  Francesca Retana 79 y.o. female MRN: [id number]  Unit/Bed#: ED 17 Encounter: [id number]    Assessment    Principal Problem:    Acute respiratory failure with hypoxia (HCC)  Active Problems:    History of lung cancer    History of pulmonary embolism    Lesion of left native kidney      Home Pulmonary Medications:  Prn albuterol       Past Medical History:   Diagnosis Date    Cancer (HCC)     only has the left lung     MS (multiple sclerosis) (HCC)     Neurogenic bladder     Trigeminal neuralgia      Social History     Socioeconomic History    Marital status:      Spouse name: None    Number of children: None    Years of education: None    Highest education level: None   Occupational History    None   Tobacco Use    Smoking status: Former     Passive exposure: Past (as a teenager growing up)    Smokeless tobacco: Never   Vaping Use    Vaping status: Never Used   Substance and Sexual Activity    Alcohol use: Yes     Comment: Occ.    Drug use: Never    Sexual activity: Not Currently   Other Topics Concern    None   Social History Narrative    None     Social Drivers of Health     Financial Resource Strain: Low Risk  (3/12/2024)    Received [facility], [facility]    Overall Financial Resource Strain (CARDIA)     Difficulty of Paying Living Expenses: Not very hard   Food Insecurity: No Food Insecurity (10/16/2024)    Nursing - Inadequate Food Risk Classification     Worried About Running Out of Food in the Last Year: Never true     Ran Out of Food in the Last Year: Never true     Ran Out of Food in the Last Year: Not on file   Transportation Needs: No Transportation Needs (10/26/2024)    OASIS : Transportation     Lack of Transportation (Medical): No     Lack of Transportation (Non-Medical): No     Patient Unable or Declines to Respond: No   Physical Activity: Not on file   Stress: Patient Declined (3/12/2024)    Received from [facility]  [facility], [facility]    [place] citizen [employer] - Occupational Stress Questionnaire     Feeling of Stress : Patient declined   Social Connections: Feeling Socially Integrated (3/12/2024)    Received from [facility], [facility]    OASIS : Social Isolation     How often do you feel lonely or isolated from those around you?: Never   Intimate Partner Violence: Not At Risk (3/12/2024)    Received from [facility], [facility]    Humiliation, Afraid, Rape, and Kick questionnaire     Fear of Current or Ex-Partner: No     Emotionally Abused: No     Physically Abused: No     Sexually Abused: No   Housing Stability: Low Risk  (10/16/2024)    Housing Stability Vital Sign     Unable to Pay for Housing in the Last Year: No     Number of Times Moved in the Last Year: 0     Homeless in the Last Year: No       Subjective         Objective    Physical Exam:   Assessment Type: Assess only  General Appearance: Awake  Respiratory Pattern: Normal  Chest Assessment: Chest expansion symmetrical  Bilateral Breath Sounds: Expiratory wheezes    Vitals:  Blood pressure 118/67, pulse (!) 112, temperature 98.2 °F (36.8 °C), temperature source Temporal, resp. rate 21, SpO2 97%.          Imaging and other studies:           Plan    Respiratory Plan: Mild Distress pathway        Resp Comments: pt with history of lung CA, has albuterol at home she ratlery uses. PT is admitted with the flu and has wheezing on exam will order x/a TID for current wheezing

## 2025-01-05 NOTE — ASSESSMENT & PLAN NOTE
CT abdomen 10/8/2024 remarkable for 1.7 cm enhancing mass left posterior midpole concerning for renal cell carcinoma was recs for urology evaluation as per radiological report.      Patient is aware and noted that she saw urology already and has plan to repeate CT scan next week and follow up with urology and medical oncology after. Patient is established with medical oncology at [facility] last seen by Mildred Noyola PA-C 9/25/2024.

## 2025-01-05 NOTE — PROGRESS NOTES
Progress Note - Hospitalist   Name: Francesca Retana 79 y.o. female I MRN: [id number]  Unit/Bed#: 2 E 253-01 I Date of Admission: 1/4/2025   Date of Service: 1/5/2025 I Hospital Day: 0    Assessment & Plan  Acute respiratory failure with hypoxia (HCC)  Presents to the ER 1//25 with cough/X 3 to 4 days  X-ray with no acute abnormalities, CBC and CMP unremarkable  Reportedly wheezing on exam, s/p methylprednisolone 125 mg in the ED plus nebs with some improvement but requiring 2 to 3 L nasal cannula oxygen    Tested positive influenza B  Tamiflu ordered, renally adjusted x 5 days   Supportive measures including Tessalon Perles, Nebs, PRN Acetaminophen , PRN NC Oxygen ordered   Patient weaned to 1 L of O2 however appears wheezy on exam.  Will continue with nebulizers  Started Hycodan as needed for cough.    Sepsis without acute organ dysfunction (HCC)  Patient with tachycardia and tachypnea.  Tested positive for influenza type B.  Started on Tamiflu.  Continue with IV fluids.  Pending blood culture.  Lactic acid and procalcitonin within normal range.  Will continue treatment for influenza and supportive care.  If patient develops fever and persistent sepsis consider starting antibiotics.  History of lung cancer  History of non-small cell lung cancer s/p right pneumonectomy November 2016, stage IIIa T4 N1 M0, with positive margins s/p adjuvant radiation with carboplatin and paclitaxel completed February 2017    Per chart review visit September 2024 with medical oncology patient reported weight loss 17 pounds and CT scan September 4, 2024 showed new 8 mm pleural-based nodule left lower lobe and a noted plan was for repeat imaging and follow-up ~  January 2025. Patient is aware and she has the CT scheduled next week as outpatient.     History of pulmonary embolism  History of large left PE s/p Eliquis  Lesion of left native kidney  CT abdomen 10/8/2024 remarkable for 1.7 cm enhancing mass left posterior midpole concerning  for renal cell carcinoma was recs for urology evaluation as per radiological report.      Patient is aware and noted that she saw urology already and has plan to repeate CT scan next week and follow up with urology and medical oncology after. Patient is established with medical oncology at [facility] last seen by Mildred Noyola PA-C 9/25/2024.     VTE Pharmacologic Prophylaxis:   Moderate Risk (Score 3-4) - Pharmacological DVT Prophylaxis Ordered: enoxaparin (Lovenox).    Mobility:   Basic Mobility Inpatient Raw Score: 16  JH-HLM Goal: 5: Stand one or more mins  JH-HLM Achieved: 2: Bed activities/Dependent transfer  JH-HLM Goal NOT achieved. Continue with multidisciplinary rounding and encourage appropriate mobility to improve upon JH-HLM goals.    Patient Centered Rounds: I performed bedside rounds with nursing staff today.   Discussions with Specialists or Other Care Team Provider: none    Education and Discussions with Family / Patient: Patient declined call to .     Current Length of Stay: 0 day(s)  Current Patient Status: Observation   Certification Statement: The patient will continue to require additional inpatient hospital stay due to sepsis and acute respiratory failure in the setting of influenza type B  Discharge Plan: Anticipate discharge in 24-48 hrs to home.    Code Status: Level 1 - Full Code    Subjective   Patient appears ill on exam.  Reports significant cough.  Denies shortness of breath however can be appreciated wheezing on exam.  Reports chills however no fever.  Reports dry heaves however no vomiting and poor appetite.    Objective :  Temp:  [98 °F (36.7 °C)-100.4 °F (38 °C)] 98 °F (36.7 °C)  HR:  [104-149] 122  BP: (117-139)/(54-91) 121/71  Resp:  [18-22] 18  SpO2:  [90 %-99 %] 90 %  O2 Device: Nasal cannula  Nasal Cannula O2 Flow Rate (L/min):  [1 L/min-3 L/min] 1 L/min    Body mass index is 21.63 kg/m².     Input and Output Summary (last 24 hours):   No intake or  output data in the 24 hours ending 01/05/25 1318    Physical Exam  Vitals and nursing note reviewed.   Constitutional:       General: She is not in acute distress.     Appearance: She is well-developed. She is ill-appearing.   HENT:      Head: Normocephalic and atraumatic.   Eyes:      Conjunctiva/sclera: Conjunctivae normal.   Cardiovascular:      Rate and Rhythm: Regular rhythm. Tachycardia present.      Heart sounds: No murmur heard.  Pulmonary:      Effort: Pulmonary effort is normal. No respiratory distress.      Breath sounds: Wheezing present. No rales.   Abdominal:      Palpations: Abdomen is soft.      Tenderness: There is no abdominal tenderness.   Musculoskeletal:         General: No swelling.      Cervical back: Neck supple.      Right lower leg: No edema.      Left lower leg: No edema.   Skin:     General: Skin is warm and dry.      Capillary Refill: Capillary refill takes less than 2 seconds.   Neurological:      Mental Status: She is alert.   Psychiatric:         Mood and Affect: Mood normal.           Lines/Drains:              Lab Results: I have reviewed the following results:   Results from last 7 days   Lab Units 01/05/25  0603   WBC Thousand/uL 4.90   HEMOGLOBIN g/dL 10.9*   HEMATOCRIT % 33.3*   PLATELETS Thousands/uL 198   SEGS PCT % 71   LYMPHO PCT % 17   MONO PCT % 10   EOS PCT % 1     Results from last 7 days   Lab Units 01/05/25  0603   SODIUM mmol/L 139   POTASSIUM mmol/L 3.3*   CHLORIDE mmol/L 105   CO2 mmol/L 29   BUN mg/dL 22   CREATININE mg/dL 0.78   ANION GAP mmol/L 5   CALCIUM mg/dL 8.7   ALBUMIN g/dL 3.5   TOTAL BILIRUBIN mg/dL 0.39   ALK PHOS U/L 75   ALT U/L 7   AST U/L 14   GLUCOSE RANDOM mg/dL 117     Results from last 7 days   Lab Units 01/04/25  1817   INR  0.87             Results from last 7 days   Lab Units 01/04/25  1817   LACTIC ACID mmol/L 1.1   PROCALCITONIN ng/ml <0.05       Recent Cultures (last 7 days):   Results from last 7 days   Lab Units 01/04/25  1817   BLOOD  CULTURE  Received in Microbiology Lab. Culture in Progress.  Received in Microbiology Lab. Culture in Progress.       Imaging Results Review: I reviewed radiology reports from this admission including: chest xray.  Other Study Results Review: EKG was reviewed.     Last 24 Hours Medication List:     Current Facility-Administered Medications:     acetaminophen (TYLENOL) tablet 650 mg, Q8H PRN    albuterol (PROVENTIL HFA,VENTOLIN HFA) inhaler 2 puff, Q4H PRN    aspirin (ECOTRIN LOW STRENGTH) EC tablet 81 mg, Daily    benzonatate (TESSALON PERLES) capsule 100 mg, TID    carBAMazepine (TEGretol XR) 12 hr tablet 200 mg, BID    enoxaparin (LOVENOX) subcutaneous injection 40 mg, Daily    HYDROcodone Bit-Homatrop MBr (HYCODAN) oral syrup 5 mL, Q4H PRN    ipratropium (ATROVENT) 0.02 % inhalation solution 0.5 mg, TID    levalbuterol (XOPENEX) inhalation solution 1.25 mg, TID    oseltamivir (TAMIFLU) capsule 30 mg, Q12H JOHANA    Administrative Statements   Today, Patient Was Seen By: Haven Wiggins MD  I have spent a total time of 50 minutes in caring for this patient on the day of the visit/encounter including Diagnostic results, Prognosis, Risks and benefits of tx options, Instructions for management, Patient and family education, Importance of tx compliance, Risk factor reductions, Impressions, Counseling / Coordination of care, Documenting in the medical record, Reviewing / ordering tests, medicine, procedures  , and Obtaining or reviewing history  .    **Please Note: This note may have been constructed using a voice recognition system.**

## 2025-01-06 ENCOUNTER — APPOINTMENT (INPATIENT)
Dept: CT IMAGING | Facility: HOSPITAL | Age: 80
DRG: 871 | End: 2025-01-06
Payer: COMMERCIAL

## 2025-01-06 LAB
ANION GAP SERPL CALCULATED.3IONS-SCNC: 4 MMOL/L (ref 4–13)
BASOPHILS # BLD AUTO: 0.04 THOUSANDS/ΜL (ref 0–0.1)
BASOPHILS NFR BLD AUTO: 1 % (ref 0–1)
BUN SERPL-MCNC: 18 MG/DL (ref 5–25)
CALCIUM SERPL-MCNC: 8.6 MG/DL (ref 8.4–10.2)
CHLORIDE SERPL-SCNC: 105 MMOL/L (ref 96–108)
CO2 SERPL-SCNC: 30 MMOL/L (ref 21–32)
CREAT SERPL-MCNC: 0.74 MG/DL (ref 0.6–1.3)
D DIMER PPP FEU-MCNC: 1.03 UG/ML FEU
EOSINOPHIL # BLD AUTO: 0.09 THOUSAND/ΜL (ref 0–0.61)
EOSINOPHIL NFR BLD AUTO: 2 % (ref 0–6)
ERYTHROCYTE [DISTWIDTH] IN BLOOD BY AUTOMATED COUNT: 15.1 % (ref 11.6–15.1)
GFR SERPL CREATININE-BSD FRML MDRD: 76 ML/MIN/1.73SQ M
GLUCOSE SERPL-MCNC: 94 MG/DL (ref 65–140)
HCT VFR BLD AUTO: 32.3 % (ref 34.8–46.1)
HGB BLD-MCNC: 10.3 G/DL (ref 11.5–15.4)
IMM GRANULOCYTES # BLD AUTO: 0.01 THOUSAND/UL (ref 0–0.2)
IMM GRANULOCYTES NFR BLD AUTO: 0 % (ref 0–2)
LYMPHOCYTES # BLD AUTO: 1.14 THOUSANDS/ΜL (ref 0.6–4.47)
LYMPHOCYTES NFR BLD AUTO: 25 % (ref 14–44)
MAGNESIUM SERPL-MCNC: 1.5 MG/DL (ref 1.9–2.7)
MCH RBC QN AUTO: 30.4 PG (ref 26.8–34.3)
MCHC RBC AUTO-ENTMCNC: 31.9 G/DL (ref 31.4–37.4)
MCV RBC AUTO: 95 FL (ref 82–98)
MONOCYTES # BLD AUTO: 0.52 THOUSAND/ΜL (ref 0.17–1.22)
MONOCYTES NFR BLD AUTO: 12 % (ref 4–12)
NEUTROPHILS # BLD AUTO: 2.71 THOUSANDS/ΜL (ref 1.85–7.62)
NEUTS SEG NFR BLD AUTO: 60 % (ref 43–75)
NRBC BLD AUTO-RTO: 0 /100 WBCS
PLATELET # BLD AUTO: 171 THOUSANDS/UL (ref 149–390)
PMV BLD AUTO: 9.5 FL (ref 8.9–12.7)
POTASSIUM SERPL-SCNC: 3.3 MMOL/L (ref 3.5–5.3)
PROCALCITONIN SERPL-MCNC: 0.08 NG/ML
RBC # BLD AUTO: 3.39 MILLION/UL (ref 3.81–5.12)
SODIUM SERPL-SCNC: 139 MMOL/L (ref 135–147)
WBC # BLD AUTO: 4.51 THOUSAND/UL (ref 4.31–10.16)

## 2025-01-06 PROCEDURE — 94640 AIRWAY INHALATION TREATMENT: CPT

## 2025-01-06 PROCEDURE — 85025 COMPLETE CBC W/AUTO DIFF WBC: CPT

## 2025-01-06 PROCEDURE — 84145 PROCALCITONIN (PCT): CPT

## 2025-01-06 PROCEDURE — 94760 N-INVAS EAR/PLS OXIMETRY 1: CPT

## 2025-01-06 PROCEDURE — 94664 DEMO&/EVAL PT USE INHALER: CPT

## 2025-01-06 PROCEDURE — 83735 ASSAY OF MAGNESIUM: CPT

## 2025-01-06 PROCEDURE — 80048 BASIC METABOLIC PNL TOTAL CA: CPT

## 2025-01-06 PROCEDURE — 85379 FIBRIN DEGRADATION QUANT: CPT

## 2025-01-06 PROCEDURE — 99232 SBSQ HOSP IP/OBS MODERATE 35: CPT

## 2025-01-06 PROCEDURE — 71275 CT ANGIOGRAPHY CHEST: CPT

## 2025-01-06 RX ORDER — MAGNESIUM SULFATE HEPTAHYDRATE 40 MG/ML
2 INJECTION, SOLUTION INTRAVENOUS ONCE
Status: COMPLETED | OUTPATIENT
Start: 2025-01-06 | End: 2025-01-06

## 2025-01-06 RX ORDER — POTASSIUM CHLORIDE 1500 MG/1
40 TABLET, EXTENDED RELEASE ORAL ONCE
Status: COMPLETED | OUTPATIENT
Start: 2025-01-06 | End: 2025-01-06

## 2025-01-06 RX ORDER — POTASSIUM CHLORIDE 1500 MG/1
40 TABLET, EXTENDED RELEASE ORAL 2 TIMES DAILY
Status: DISCONTINUED | OUTPATIENT
Start: 2025-01-06 | End: 2025-01-06

## 2025-01-06 RX ADMIN — MAGNESIUM SULFATE HEPTAHYDRATE 2 G: 40 INJECTION, SOLUTION INTRAVENOUS at 09:08

## 2025-01-06 RX ADMIN — CARBAMAZEPINE 200 MG: 100 TABLET, EXTENDED RELEASE ORAL at 17:00

## 2025-01-06 RX ADMIN — OSELTAMIVIR PHOSPHATE 30 MG: 30 CAPSULE ORAL at 09:08

## 2025-01-06 RX ADMIN — OSELTAMIVIR PHOSPHATE 30 MG: 30 CAPSULE ORAL at 21:00

## 2025-01-06 RX ADMIN — BENZONATATE 100 MG: 100 CAPSULE ORAL at 09:08

## 2025-01-06 RX ADMIN — IPRATROPIUM BROMIDE 0.5 MG: 0.5 SOLUTION RESPIRATORY (INHALATION) at 13:11

## 2025-01-06 RX ADMIN — BENZONATATE 100 MG: 100 CAPSULE ORAL at 17:00

## 2025-01-06 RX ADMIN — BENZONATATE 100 MG: 100 CAPSULE ORAL at 21:00

## 2025-01-06 RX ADMIN — LEVALBUTEROL HYDROCHLORIDE 1.25 MG: 1.25 SOLUTION RESPIRATORY (INHALATION) at 13:11

## 2025-01-06 RX ADMIN — IPRATROPIUM BROMIDE 0.5 MG: 0.5 SOLUTION RESPIRATORY (INHALATION) at 18:08

## 2025-01-06 RX ADMIN — IOHEXOL 85 ML: 350 INJECTION, SOLUTION INTRAVENOUS at 15:44

## 2025-01-06 RX ADMIN — IPRATROPIUM BROMIDE 0.5 MG: 0.5 SOLUTION RESPIRATORY (INHALATION) at 07:17

## 2025-01-06 RX ADMIN — ENOXAPARIN SODIUM 40 MG: 40 INJECTION SUBCUTANEOUS at 09:08

## 2025-01-06 RX ADMIN — ACETAMINOPHEN 650 MG: 325 TABLET, FILM COATED ORAL at 23:13

## 2025-01-06 RX ADMIN — CARBAMAZEPINE 200 MG: 100 TABLET, EXTENDED RELEASE ORAL at 12:18

## 2025-01-06 RX ADMIN — LEVALBUTEROL HYDROCHLORIDE 1.25 MG: 1.25 SOLUTION RESPIRATORY (INHALATION) at 18:08

## 2025-01-06 RX ADMIN — POTASSIUM CHLORIDE 40 MEQ: 1500 TABLET, EXTENDED RELEASE ORAL at 09:08

## 2025-01-06 RX ADMIN — LEVALBUTEROL HYDROCHLORIDE 1.25 MG: 1.25 SOLUTION RESPIRATORY (INHALATION) at 07:17

## 2025-01-06 RX ADMIN — ASPIRIN 81 MG: 81 TABLET, COATED ORAL at 09:08

## 2025-01-06 NOTE — ASSESSMENT & PLAN NOTE
Presents to the ER 1//25 with cough/X 3 to 4 days  X-ray with no acute abnormalities, CBC and CMP unremarkable  Reportedly wheezing on exam, s/p methylprednisolone 125 mg in the ED plus nebs with some improvement but requiring 2 to 3 L nasal cannula oxygen    Tested positive influenza B  PLAN  Tamiflu ordered, renally adjusted x 5 days [id number]  Supportive measures including Tessalon Perles, Nebs, PRN Acetaminophen , PRN NC Oxygen ordered   Patient weaned to 1 L of O2 however appears wheezy on exam.  Will continue with nebulizers  Started Hycodan as needed for cough.  Low threshold to CT chest and bring pulm on board d/t pt hx.   D Dimer pending if pos will do PE study

## 2025-01-06 NOTE — PROGRESS NOTES
Progress Note - Hospitalist   Name: Francesca Retana 79 y.o. female I MRN: [id number]  Unit/Bed#: 2 E 253-01 I Date of Admission: 1/4/2025   Date of Service: 1/6/2025 I Hospital Day: 1    Assessment & Plan  Acute respiratory failure with hypoxia (HCC)  Presents to the ER 1//25 with cough/X 3 to 4 days  X-ray with no acute abnormalities, CBC and CMP unremarkable  Reportedly wheezing on exam, s/p methylprednisolone 125 mg in the ED plus nebs with some improvement but requiring 2 to 3 L nasal cannula oxygen    Tested positive influenza B  PLAN  Tamiflu ordered, renally adjusted x 5 days [id number]  Supportive measures including Tessalon Perles, Nebs, PRN Acetaminophen , PRN NC Oxygen ordered   Patient weaned to 1 L of O2 however appears wheezy on exam.  Will continue with nebulizers  Started Hycodan as needed for cough.  Low threshold to CT chest and bring pulm on board d/t pt hx.   D Dimer pending if pos will do PE study     Sepsis without acute organ dysfunction (HCC)  Patient with tachycardia and tachypnea.  Tested positive for influenza type B.  Started on Tamiflu.  Continue with IV fluids.  Pending blood culture.  Lactic acid and procalcitonin within normal range.  Will continue treatment for influenza and supportive care.  If patient develops fever and persistent sepsis consider starting antibiotics.  History of lung cancer  History of non-small cell lung cancer s/p right pneumonectomy November 2016, stage IIIa T4 N1 M0, with positive margins s/p adjuvant radiation with carboplatin and paclitaxel completed February 2017    Per chart review visit September 2024 with medical oncology patient reported weight loss 17 pounds and CT scan September 4, 2024 showed new 8 mm pleural-based nodule left lower lobe and a noted plan was for repeat imaging and follow-up ~  January 2025. Patient is aware and she has the CT scheduled next week as outpatient.     History of pulmonary embolism  History of large left PE s/p  Eliquis    PLAN  As above  Lesion of left native kidney  CT abdomen 10/8/2024 remarkable for 1.7 cm enhancing mass left posterior midpole concerning for renal cell carcinoma was recs for urology evaluation as per radiological report.      Patient is aware and noted that she saw urology already and has plan to repeate CT scan next week and follow up with urology and medical oncology after. Patient is established with medical oncology at [facility] last seen by Mildred Noyola PA-C 9/25/2024.     VTE Pharmacologic Prophylaxis: VTE Score: 12 High Risk (Score >/= 5) - Pharmacological DVT Prophylaxis Ordered: enoxaparin (Lovenox). Sequential Compression Devices Ordered.    Mobility:   Basic Mobility Inpatient Raw Score: 16  JH-HLM Goal: 5: Stand one or more mins  JH-HLM Achieved: 3: Sit at edge of bed  JH-HLM Goal NOT achieved. Continue with multidisciplinary rounding and encourage appropriate mobility to improve upon JH-HLM goals.    Patient Centered Rounds: I performed bedside rounds with nursing staff today.   Discussions with Specialists or Other Care Team Provider: STEVE    Education and Discussions with Family / Patient: Updated  (daughter) via phone.    Current Length of Stay: 1 day(s)  Current Patient Status: Inpatient   Certification Statement: The patient will continue to require additional inpatient hospital stay due to cont oxygen requirement   Discharge Plan: Anticipate discharge in 24-48 hrs to discharge location to be determined pending rehab evaluations.    Code Status: Level 1 - Full Code    Subjective   Pt seen in the morning. Still on one liter O2.  Pt notes feeling well but hasn't left bed.     Objective :  Temp:  [99.4 °F (37.4 °C)-100.9 °F (38.3 °C)] 99.4 °F (37.4 °C)  HR:  [102-116] 104  BP: (118-140)/(61-89) 140/89  Resp:  [18] 18  SpO2:  [90 %-99 %] 91 %  O2 Device: Nasal cannula  Nasal Cannula O2 Flow Rate (L/min):  [1 L/min] 1 L/min    Body mass index is 21.63 kg/m².      Input and Output Summary (last 24 hours):     Intake/Output Summary (Last 24 hours) at 1/6/2025 1330  Last data filed at 1/6/2025 1311  Gross per 24 hour   Intake 480 ml   Output --   Net 480 ml       Physical Exam  Vitals and nursing note reviewed.   Constitutional:       General: She is not in acute distress.     Appearance: She is well-developed. She is ill-appearing.   HENT:      Head: Normocephalic and atraumatic.      Nose: Nose normal.      Mouth/Throat:      Mouth: Mucous membranes are moist.   Eyes:      General: No scleral icterus.     Conjunctiva/sclera: Conjunctivae normal.   Cardiovascular:      Rate and Rhythm: Regular rhythm. Tachycardia present.      Heart sounds: No murmur heard.  Pulmonary:      Effort: Pulmonary effort is normal. No respiratory distress.      Breath sounds: Wheezing present. No rales.   Abdominal:      Palpations: Abdomen is soft.      Tenderness: There is no abdominal tenderness. There is no guarding or rebound.   Musculoskeletal:         General: No swelling.      Cervical back: Neck supple. No rigidity or tenderness.      Right lower leg: No edema.      Left lower leg: No edema.   Lymphadenopathy:      Cervical: No cervical adenopathy.   Skin:     General: Skin is warm and dry.      Capillary Refill: Capillary refill takes less than 2 seconds.   Neurological:      Mental Status: She is alert.   Psychiatric:         Mood and Affect: Mood normal.           Lines/Drains:              Lab Results: I have reviewed the following results:   Results from last 7 days   Lab Units 01/06/25  0428   WBC Thousand/uL 4.51   HEMOGLOBIN g/dL 10.3*   HEMATOCRIT % 32.3*   PLATELETS Thousands/uL 171   SEGS PCT % 60   LYMPHO PCT % 25   MONO PCT % 12   EOS PCT % 2     Results from last 7 days   Lab Units 01/06/25  0428 01/05/25  0603   SODIUM mmol/L 139 139   POTASSIUM mmol/L 3.3* 3.3*   CHLORIDE mmol/L 105 105   CO2 mmol/L 30 29   BUN mg/dL 18 22   CREATININE mg/dL 0.74 0.78   ANION GAP mmol/L  4 5   CALCIUM mg/dL 8.6 8.7   ALBUMIN g/dL  --  3.5   TOTAL BILIRUBIN mg/dL  --  0.39   ALK PHOS U/L  --  75   ALT U/L  --  7   AST U/L  --  14   GLUCOSE RANDOM mg/dL 94 117     Results from last 7 days   Lab Units 01/04/25  1817   INR  0.87             Results from last 7 days   Lab Units 01/06/25  0428 01/04/25  1817   LACTIC ACID mmol/L  --  1.1   PROCALCITONIN ng/ml 0.08 <0.05       Recent Cultures (last 7 days):   Results from last 7 days   Lab Units 01/04/25  1817   BLOOD CULTURE  No Growth at 24 hrs.  No Growth at 24 hrs.       Imaging Results Review: No pertinent imaging studies reviewed.  Other Study Results Review: EKG was personally reviewed and my interpretation is: Sinus Tachycardia.  No Qtc ..    Last 24 Hours Medication List:     Current Facility-Administered Medications:     acetaminophen (TYLENOL) tablet 650 mg, Q8H PRN    albuterol (PROVENTIL HFA,VENTOLIN HFA) inhaler 2 puff, Q4H PRN    aspirin (ECOTRIN LOW STRENGTH) EC tablet 81 mg, Daily    benzonatate (TESSALON PERLES) capsule 100 mg, TID    carBAMazepine (TEGretol XR) 12 hr tablet 200 mg, BID    enoxaparin (LOVENOX) subcutaneous injection 40 mg, Daily    HYDROcodone Bit-Homatrop MBr (HYCODAN) oral syrup 5 mL, Q4H PRN    ipratropium (ATROVENT) 0.02 % inhalation solution 0.5 mg, TID    ketorolac (TORADOL) injection 15 mg, Q6H PRN    levalbuterol (XOPENEX) inhalation solution 1.25 mg, TID    oseltamivir (TAMIFLU) capsule 30 mg, Q12H JOHANA    Administrative Statements   Today, Patient Was Seen By: Bj Rudd MD  Nutrition Assessment and Intervention:     Ordered nutritional assessment labs         **Please Note: This note may have been constructed using a voice recognition system.**

## 2025-01-06 NOTE — UTILIZATION REVIEW
NOTIFICATION OF INPATIENT ADMISSION   AUTHORIZATION REQUEST   SERVICING FACILITY:   [place] [address]  Tax ID: [id number]  NPI: [id number] ATTENDING PROVIDER:  Attending Name and NPI#: Haven Wiggins Md [[id number]]  Address: [address] [place] [address]  Phone: [phone]     ADMISSION INFORMATION:  Place of Service: Inpatient [facility]  Place of Service Code: 21  Inpatient Admission Date/Time: 1/5/25  1:19 PM  Discharge Date/Time: No discharge date for patient encounter.  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     PHYSICIAN ADVISORY SERVICES:  Medical Necessity Denial & Ecnr-xk-Ciqh Review  Phone: [phone]  Fax: [phone]  Email: [email or web address]     DISCHARGE SUPPORT TEAM:  For Patients Discharge Needs & Updates  Phone: [phone] opt. 2 Fax: [phone]  Email: [email or web address]

## 2025-01-06 NOTE — PLAN OF CARE
Problem: PAIN - ADULT  Goal: Verbalizes/displays adequate comfort level or baseline comfort level  Description: Interventions:  - Encourage patient to monitor pain and request assistance  - Assess pain using appropriate pain scale  - Administer analgesics based on type and severity of pain and evaluate response  - Implement non-pharmacological measures as appropriate and evaluate response  - Consider cultural and social influences on pain and pain management  - Notify physician/advanced practitioner if interventions unsuccessful or patient reports new pain  Outcome: Progressing     Problem: INFECTION - ADULT  Goal: Absence or prevention of progression during hospitalization  Description: INTERVENTIONS:  - Assess and monitor for signs and symptoms of infection  - Monitor lab/diagnostic results  - Monitor all insertion sites, i.e. indwelling lines, tubes, and drains  - Monitor endotracheal if appropriate and nasal secretions for changes in amount and color  - [place] appropriate cooling/warming therapies per order  - Administer medications as ordered  - Instruct and encourage patient and family to use good hand hygiene technique  - Identify and instruct in appropriate isolation precautions for identified infection/condition  Outcome: Progressing  Goal: Absence of fever/infection during neutropenic period  Description: INTERVENTIONS:  - Monitor WBC    Outcome: Progressing     Problem: SAFETY ADULT  Goal: Patient will remain free of falls  Description: INTERVENTIONS:  - Educate patient/family on patient safety including physical limitations  - Instruct patient to call for assistance with activity   - Consult OT/PT to assist with strengthening/mobility   - Keep Call bell within reach  - Keep bed low and locked with side rails adjusted as appropriate  - Keep care items and personal belongings within reach  - Initiate and maintain comfort rounds  - Make Fall Risk Sign visible to staff  - Offer Toileting every 2 Hours,  in advance of need  - Initiate/Maintain bedalarm  - Obtain necessary fall risk management equipment: bed alarm, nonskid socks  - Apply yellow socks and bracelet for high fall risk patients  - Consider moving patient to room near nurses station  Outcome: Progressing  Goal: Maintain or return to baseline ADL function  Description: INTERVENTIONS:  -  Assess patient's ability to carry out ADLs; assess patient's baseline for ADL function and identify physical deficits which impact ability to perform ADLs (bathing, care of mouth/teeth, toileting, grooming, dressing, etc.)  - Assess/evaluate cause of self-care deficits   - Assess range of motion  - Assess patient's mobility; develop plan if impaired  - Assess patient's need for assistive devices and provide as appropriate  - Encourage maximum independence but intervene and supervise when necessary  - Involve family in performance of ADLs  - Assess for home care needs following discharge   - Consider OT consult to assist with ADL evaluation and planning for discharge  - Provide patient education as appropriate  Outcome: Progressing  Goal: Maintains/Returns to pre admission functional level  Description: INTERVENTIONS:  - Perform AM-PAC 6 Click Basic Mobility/ Daily Activity assessment daily.  - Set and communicate daily mobility goal to care team and patient/family/caregiver.   - Collaborate with rehabilitation services on mobility goals if consulted  - Perform Range of Motion 4 times a day.  - Reposition patient every 2 hours.  - Dangle patient 4 times a day  - Stand patient 4 times a day  - Ambulate patient 4 times a day  - Out of bed to chair 4 times a day   - Out of bed for meals 4 times a day  - Out of bed for toileting  - Record patient progress and toleration of activity level   Outcome: Progressing     Problem: DISCHARGE PLANNING  Goal: Discharge to home or other facility with appropriate resources  Description: INTERVENTIONS:  - Identify barriers to discharge  w/patient and caregiver  - Arrange for needed discharge resources and transportation as appropriate  - Identify discharge learning needs (meds, wound care, etc.)  - Arrange for interpretive services to assist at discharge as needed  - Refer to Case Management Department for coordinating discharge planning if the patient needs post-hospital services based on physician/advanced practitioner order or complex needs related to functional status, cognitive ability, or social support system  Outcome: Progressing     Problem: Knowledge Deficit  Goal: Patient/family/caregiver demonstrates understanding of disease process, treatment plan, medications, and discharge instructions  Description: Complete learning assessment and assess knowledge base.  Interventions:  - Provide teaching at level of understanding  - Provide teaching via preferred learning methods  Outcome: Progressing     Problem: RESPIRATORY - ADULT  Goal: Achieves optimal ventilation and oxygenation  Description: INTERVENTIONS:  - Assess for changes in respiratory status  - Assess for changes in mentation and behavior  - Position to facilitate oxygenation and minimize respiratory effort  - Oxygen administered by appropriate delivery if ordered  - Initiate smoking cessation education as indicated  - Encourage broncho-pulmonary hygiene including cough, deep breathe, Incentive Spirometry  - Assess the need for suctioning and aspirate as needed  - Assess and instruct to report SOB or any respiratory difficulty  - Respiratory Therapy support as indicated  Outcome: Progressing

## 2025-01-06 NOTE — ASSESSMENT & PLAN NOTE
CT abdomen 10/8/2024 remarkable for 1.7 cm enhancing mass left posterior midpole concerning for renal cell carcinoma was recs for urology evaluation as per radiological report.      Patient is aware and noted that she saw urology already and has plan to repeate CT scan next week and follow up with urology and medical oncology after. Patient is established with medical oncology at [facility] last seen by Mildred Noyola PA-C 9/25/2024.

## 2025-01-06 NOTE — RESPIRATORY THERAPY NOTE
RT Protocol Note  Francesca Retana 79 y.o. female MRN: [id number]  Unit/Bed#: 2 E 253-01 Encounter: [id number]    Assessment    Principal Problem:    Acute respiratory failure with hypoxia (HCC)  Active Problems:    History of lung cancer    Sepsis without acute organ dysfunction (HCC)    History of pulmonary embolism    Lesion of left native kidney      Home Pulmonary Medications:         Past Medical History:   Diagnosis Date    Cancer (HCC)     only has the left lung     MS (multiple sclerosis) (HCC)     Neurogenic bladder     Trigeminal neuralgia      Social History     Socioeconomic History    Marital status:      Spouse name: None    Number of children: None    Years of education: None    Highest education level: None   Occupational History    None   Tobacco Use    Smoking status: Former     Passive exposure: Past (as a teenager growing up)    Smokeless tobacco: Never   Vaping Use    Vaping status: Never Used   Substance and Sexual Activity    Alcohol use: Yes     Comment: Occ.    Drug use: Never    Sexual activity: Not Currently   Other Topics Concern    None   Social History Narrative    None     Social Drivers of Health     Financial Resource Strain: Low Risk  (3/12/2024)    [facility]    Overall Financial Resource Strain (CARDIA)     Difficulty of Paying Living Expenses: Not very hard   Food Insecurity: No Food Insecurity (1/4/2025)    Nursing - Inadequate Food Risk Classification     Worried About Running Out of Food in the Last Year: Never true     Ran Out of Food in the Last Year: Never true     Ran Out of Food in the Last Year: Never true   Transportation Needs: No Transportation Needs (1/4/2025)    Nursing - Transportation Risk Classification     Lack of Transportation: Not on file     Lack of Transportation: No   Physical Activity: Not on file   Stress: Patient Declined (3/12/2024)    Received from [facility],  "[facility]    [place] [facility] - Occupational Stress Questionnaire     Feeling of Stress : Patient declined   Social Connections: Feeling Socially Integrated (3/12/2024)    [facility]    OASIS : Social Isolation     How often do you feel lonely or isolated from those around you?: Never   Intimate Partner Violence: Unknown (2025)    Nursing IPS     Feels Physically and Emotionally Safe: Not on file     Physically Hurt by Someone: Not on file     Humiliated or Emotionally Abused by Someone: Not on file     Physically Hurt by Someone: No     Hurt or Threatened by Someone: No   Housing Stability: Unknown (2025)    Nursing: Inadequate Housing Risk Classification     Has Housing: Not on file     Worried About Losing Housing: Not on file     Unable to Get Utilities: Not on file     Unable to Pay for Housing in the Last Year: No     Has Housin       Subjective         Objective    Physical Exam:   Assessment Type: Assess only  General Appearance: Awake, Alert  Respiratory Pattern: Normal  Chest Assessment: Chest expansion symmetrical  Bilateral Breath Sounds: Diminished, Expiratory wheezes    Vitals:  Blood pressure 140/89, pulse 104, temperature 99.4 °F (37.4 °C), resp. rate 18, height 5' 5\" (1.651 m), weight 59 kg (130 lb), SpO2 90%.          Imaging and other studies: Results Review Statement: No pertinent imaging studies reviewed.    O2 Device: nc     Plan    Respiratory Plan: Mild Distress pathway        Resp Comments: pt with hx of copd and dx of flu, will continue with xop/atr tid   "

## 2025-01-06 NOTE — PLAN OF CARE
Problem: PAIN - ADULT  Goal: Verbalizes/displays adequate comfort level or baseline comfort level  Description: Interventions:  - Encourage patient to monitor pain and request assistance  - Assess pain using appropriate pain scale  - Administer analgesics based on type and severity of pain and evaluate response  - Implement non-pharmacological measures as appropriate and evaluate response  - Consider cultural and social influences on pain and pain management  - Notify physician/advanced practitioner if interventions unsuccessful or patient reports new pain  Outcome: Progressing     Problem: INFECTION - ADULT  Goal: Absence or prevention of progression during hospitalization  Description: INTERVENTIONS:  - Assess and monitor for signs and symptoms of infection  - Monitor lab/diagnostic results  - Monitor all insertion sites, i.e. indwelling lines, tubes, and drains  - Monitor endotracheal if appropriate and nasal secretions for changes in amount and color  - [place] appropriate cooling/warming therapies per order  - Administer medications as ordered  - Instruct and encourage patient and family to use good hand hygiene technique  - Identify and instruct in appropriate isolation precautions for identified infection/condition  Outcome: Progressing  Goal: Absence of fever/infection during neutropenic period  Description: INTERVENTIONS:  - Monitor WBC    Outcome: Progressing     Problem: SAFETY ADULT  Goal: Patient will remain free of falls  Description: INTERVENTIONS:  - Educate patient/family on patient safety including physical limitations  - Instruct patient to call for assistance with activity   - Consult OT/PT to assist with strengthening/mobility   - Keep Call bell within reach  - Keep bed low and locked with side rails adjusted as appropriate  - Keep care items and personal belongings within reach  - Initiate and maintain comfort rounds  - Make Fall Risk Sign visible to staff  - Offer Toileting every 2 Hours,  in advance of need  - Initiate/Maintain alarm  - Obtain necessary fall risk management equipment:   - Apply yellow socks and bracelet for high fall risk patients  - Consider moving patient to room near nurses station  Outcome: Progressing  Goal: Maintain or return to baseline ADL function  Description: INTERVENTIONS:  -  Assess patient's ability to carry out ADLs; assess patient's baseline for ADL function and identify physical deficits which impact ability to perform ADLs (bathing, care of mouth/teeth, toileting, grooming, dressing, etc.)  - Assess/evaluate cause of self-care deficits   - Assess range of motion  - Assess patient's mobility; develop plan if impaired  - Assess patient's need for assistive devices and provide as appropriate  - Encourage maximum independence but intervene and supervise when necessary  - Involve family in performance of ADLs  - Assess for home care needs following discharge   - Consider OT consult to assist with ADL evaluation and planning for discharge  - Provide patient education as appropriate  Outcome: Progressing  Goal: Maintains/Returns to pre admission functional level  Description: INTERVENTIONS:  - Perform AM-PAC 6 Click Basic Mobility/ Daily Activity assessment daily.  - Set and communicate daily mobility goal to care team and patient/family/caregiver.   - Collaborate with rehabilitation services on mobility goals if consulted  - Perform Range of Motion 2 times a day.  - Reposition patient every 2 hours.  - Dangle patient 2 times a day  - Stand patient 2 times a day  - Ambulate patient 2 times a day  - Out of bed to chair 2 times a day   - Out of bed for meals 2 times a day  - Out of bed for toileting  - Record patient progress and toleration of activity level   Outcome: Progressing     Problem: DISCHARGE PLANNING  Goal: Discharge to home or other facility with appropriate resources  Description: INTERVENTIONS:  - Identify barriers to discharge w/patient and caregiver  -  Arrange for needed discharge resources and transportation as appropriate  - Identify discharge learning needs (meds, wound care, etc.)  - Arrange for interpretive services to assist at discharge as needed  - Refer to Case Management Department for coordinating discharge planning if the patient needs post-hospital services based on physician/advanced practitioner order or complex needs related to functional status, cognitive ability, or social support system  Outcome: Progressing     Problem: Knowledge Deficit  Goal: Patient/family/caregiver demonstrates understanding of disease process, treatment plan, medications, and discharge instructions  Description: Complete learning assessment and assess knowledge base.  Interventions:  - Provide teaching at level of understanding  - Provide teaching via preferred learning methods  Outcome: Progressing     Problem: RESPIRATORY - ADULT  Goal: Achieves optimal ventilation and oxygenation  Description: INTERVENTIONS:  - Assess for changes in respiratory status  - Assess for changes in mentation and behavior  - Position to facilitate oxygenation and minimize respiratory effort  - Oxygen administered by appropriate delivery if ordered  - Initiate smoking cessation education as indicated  - Encourage broncho-pulmonary hygiene including cough, deep breathe, Incentive Spirometry  - Assess the need for suctioning and aspirate as needed  - Assess and instruct to report SOB or any respiratory difficulty  - Respiratory Therapy support as indicated  Outcome: Progressing

## 2025-01-07 LAB
ANION GAP SERPL CALCULATED.3IONS-SCNC: 6 MMOL/L (ref 4–13)
BASOPHILS # BLD AUTO: 0.04 THOUSANDS/ΜL (ref 0–0.1)
BASOPHILS NFR BLD AUTO: 1 % (ref 0–1)
BUN SERPL-MCNC: 15 MG/DL (ref 5–25)
CALCIUM SERPL-MCNC: 8.2 MG/DL (ref 8.4–10.2)
CHLORIDE SERPL-SCNC: 105 MMOL/L (ref 96–108)
CO2 SERPL-SCNC: 29 MMOL/L (ref 21–32)
CREAT SERPL-MCNC: 0.71 MG/DL (ref 0.6–1.3)
EOSINOPHIL # BLD AUTO: 0.16 THOUSAND/ΜL (ref 0–0.61)
EOSINOPHIL NFR BLD AUTO: 3 % (ref 0–6)
ERYTHROCYTE [DISTWIDTH] IN BLOOD BY AUTOMATED COUNT: 15.3 % (ref 11.6–15.1)
GFR SERPL CREATININE-BSD FRML MDRD: 80 ML/MIN/1.73SQ M
GLUCOSE SERPL-MCNC: 86 MG/DL (ref 65–140)
HCT VFR BLD AUTO: 33.2 % (ref 34.8–46.1)
HGB BLD-MCNC: 10.6 G/DL (ref 11.5–15.4)
IMM GRANULOCYTES # BLD AUTO: 0.01 THOUSAND/UL (ref 0–0.2)
IMM GRANULOCYTES NFR BLD AUTO: 0 % (ref 0–2)
LYMPHOCYTES # BLD AUTO: 1.58 THOUSANDS/ΜL (ref 0.6–4.47)
LYMPHOCYTES NFR BLD AUTO: 31 % (ref 14–44)
MAGNESIUM SERPL-MCNC: 1.8 MG/DL (ref 1.9–2.7)
MCH RBC QN AUTO: 30.7 PG (ref 26.8–34.3)
MCHC RBC AUTO-ENTMCNC: 31.9 G/DL (ref 31.4–37.4)
MCV RBC AUTO: 96 FL (ref 82–98)
MONOCYTES # BLD AUTO: 0.57 THOUSAND/ΜL (ref 0.17–1.22)
MONOCYTES NFR BLD AUTO: 11 % (ref 4–12)
NEUTROPHILS # BLD AUTO: 2.8 THOUSANDS/ΜL (ref 1.85–7.62)
NEUTS SEG NFR BLD AUTO: 54 % (ref 43–75)
NRBC BLD AUTO-RTO: 0 /100 WBCS
PLATELET # BLD AUTO: 189 THOUSANDS/UL (ref 149–390)
PMV BLD AUTO: 10.4 FL (ref 8.9–12.7)
POTASSIUM SERPL-SCNC: 3.7 MMOL/L (ref 3.5–5.3)
RBC # BLD AUTO: 3.45 MILLION/UL (ref 3.81–5.12)
SODIUM SERPL-SCNC: 140 MMOL/L (ref 135–147)
WBC # BLD AUTO: 5.16 THOUSAND/UL (ref 4.31–10.16)

## 2025-01-07 PROCEDURE — 97163 PT EVAL HIGH COMPLEX 45 MIN: CPT

## 2025-01-07 PROCEDURE — 94664 DEMO&/EVAL PT USE INHALER: CPT

## 2025-01-07 PROCEDURE — 94640 AIRWAY INHALATION TREATMENT: CPT

## 2025-01-07 PROCEDURE — 80048 BASIC METABOLIC PNL TOTAL CA: CPT

## 2025-01-07 PROCEDURE — 94760 N-INVAS EAR/PLS OXIMETRY 1: CPT

## 2025-01-07 PROCEDURE — 85025 COMPLETE CBC W/AUTO DIFF WBC: CPT

## 2025-01-07 PROCEDURE — 97167 OT EVAL HIGH COMPLEX 60 MIN: CPT

## 2025-01-07 PROCEDURE — 83735 ASSAY OF MAGNESIUM: CPT

## 2025-01-07 PROCEDURE — 99233 SBSQ HOSP IP/OBS HIGH 50: CPT | Performed by: FAMILY MEDICINE

## 2025-01-07 RX ORDER — MAGNESIUM SULFATE 1 G/100ML
1 INJECTION INTRAVENOUS ONCE
Status: COMPLETED | OUTPATIENT
Start: 2025-01-07 | End: 2025-01-07

## 2025-01-07 RX ADMIN — ASPIRIN 81 MG: 81 TABLET, COATED ORAL at 08:32

## 2025-01-07 RX ADMIN — IPRATROPIUM BROMIDE 0.5 MG: 0.5 SOLUTION RESPIRATORY (INHALATION) at 07:11

## 2025-01-07 RX ADMIN — BENZONATATE 100 MG: 100 CAPSULE ORAL at 17:20

## 2025-01-07 RX ADMIN — ENOXAPARIN SODIUM 40 MG: 40 INJECTION SUBCUTANEOUS at 08:31

## 2025-01-07 RX ADMIN — MAGNESIUM SULFATE HEPTAHYDRATE 1 G: 1 INJECTION, SOLUTION INTRAVENOUS at 09:18

## 2025-01-07 RX ADMIN — LEVALBUTEROL HYDROCHLORIDE 1.25 MG: 1.25 SOLUTION RESPIRATORY (INHALATION) at 07:11

## 2025-01-07 RX ADMIN — CARBAMAZEPINE 200 MG: 100 TABLET, EXTENDED RELEASE ORAL at 17:21

## 2025-01-07 RX ADMIN — IPRATROPIUM BROMIDE 0.5 MG: 0.5 SOLUTION RESPIRATORY (INHALATION) at 13:15

## 2025-01-07 RX ADMIN — LEVALBUTEROL HYDROCHLORIDE 1.25 MG: 1.25 SOLUTION RESPIRATORY (INHALATION) at 13:15

## 2025-01-07 RX ADMIN — BENZONATATE 100 MG: 100 CAPSULE ORAL at 20:42

## 2025-01-07 RX ADMIN — IPRATROPIUM BROMIDE 0.5 MG: 0.5 SOLUTION RESPIRATORY (INHALATION) at 18:36

## 2025-01-07 RX ADMIN — CARBAMAZEPINE 200 MG: 100 TABLET, EXTENDED RELEASE ORAL at 08:31

## 2025-01-07 RX ADMIN — BENZONATATE 100 MG: 100 CAPSULE ORAL at 08:32

## 2025-01-07 RX ADMIN — OSELTAMIVIR PHOSPHATE 30 MG: 30 CAPSULE ORAL at 08:32

## 2025-01-07 RX ADMIN — ACETAMINOPHEN 650 MG: 325 TABLET, FILM COATED ORAL at 08:31

## 2025-01-07 RX ADMIN — OSELTAMIVIR PHOSPHATE 30 MG: 30 CAPSULE ORAL at 20:42

## 2025-01-07 RX ADMIN — LEVALBUTEROL HYDROCHLORIDE 1.25 MG: 1.25 SOLUTION RESPIRATORY (INHALATION) at 18:35

## 2025-01-07 NOTE — ASSESSMENT & PLAN NOTE
Likely Viral sepsis  Patient with tachycardia and tachypnea.  Tested positive for influenza type B.  Started on Tamiflu.  Blood culture negative 48 hours  Lactic acid and procalcitonin within normal range.  Will continue treatment for influenza and supportive care

## 2025-01-07 NOTE — PLAN OF CARE
Problem: OCCUPATIONAL THERAPY ADULT  Goal: Performs self-care activities at highest level of function for planned discharge setting.  See evaluation for individualized goals.  Description: Treatment Interventions: ADL retraining, Functional transfer training, Endurance training, Patient/family training, Compensatory technique education, Energy conservation, Activityengagement          See flowsheet documentation for full assessment, interventions and recommendations.   Outcome: Progressing  Note: Limitation: Decreased ADL status, Decreased Safe judgement during ADL, Decreased endurance, Decreased self-care trans, Decreased high-level ADLs  Prognosis: Good  Assessment: Patient is a 79 y.o. female seen for OT evaluation s/p admit to [facility] on [facility]/4[facility]20[facility] w/Acute respiratory failure with hypoxia (HCC). Prior medical history and commorbidities affecting patient's functional performance at time of assessment include: acute respiratory failure with hypoxia, h/o lung CA, sepsis, h/o PE, L renal mass, multiple sclerosis, trigeminal neuralgia, bacteremia, neurogenic bladder. Orders placed for OT evaluation and treatment.  Performed at least two patient identifiers during session including name and wristband. Pt reported living alone in a mobile home with 2 LUIS MANUEL. Pt reported being independent with ADLs and IADLs PTA. Pt reported ambulating with cane vs no AD indoors and RW used for ambulating outdoors PTA.  Personal factors affecting patient at time of initial evaluation include: difficulty performing ADLs and difficulty performing IADLs. Upon evaluation, patient requires minimal  assist for UB ADLs, maximal assist for LB ADLs, transfers and functional ambulation in room and bathroom with minimal  assist x1, with the use of Rolling Walker.  Patient is oriented x 4.  Occupational performance is affected by the following deficits: dynamic sit/ stand balance deficit with poor standing tolerance time for self care  and functional mobility, decreased activity tolerance, and decreased safety awareness.  Patient to benefit from continued Occupational Therapy treatment while in the hospital to address deficits as defined above and maximize level of functional independence with ADLs and functional mobility. Occupational Performance areas to address include: grooming , bathing/ shower, dressing, toilet hygiene, transfer to all surfaces, and functional mobility. From OT standpoint, recommendation at time of d/c would be Level 2.     Rehab Resource Intensity Level, OT: II (Moderate Resource Intensity)

## 2025-01-07 NOTE — UTILIZATION REVIEW
Continued Stay Review    Date: 1/6                          Current Patient Class: Inpatient  Current Level of Care: Med/surg     HPI:79 y.o. female initially admitted on 1/5     Assessment/Plan:  Date: 1/6  Day 3: Has surpassed a 2nd midnight with active treatments and services. Admitted for Sepsis due to influenza type B- continue with supportive care including Tamiflu..BLood cultures neg.  Continues to require 1-2 LNC oxygen. Continue tamiflu. Remains tachycardic.         Medications:   Scheduled Medications:  aspirin, 81 mg, Oral, Daily  benzonatate, 100 mg, Oral, TID  carBAMazepine, 200 mg, Oral, BID  enoxaparin, 40 mg, Subcutaneous, Daily  ipratropium, 0.5 mg, Nebulization, TID  levalbuterol, 1.25 mg, Nebulization, TID  magnesium sulfate, 1 g, Intravenous, Once  oseltamivir, 30 mg, Oral, Q12H JOHANA      Continuous IV Infusions:     PRN Meds:  acetaminophen, 650 mg, Oral, Q8H PRN  albuterol, 2 puff, Inhalation, Q4H PRN  HYDROcodone Bit-Homatrop MBr, 5 mL, Oral, Q4H PRN  ketorolac, 15 mg, Intravenous, Q6H PRN      Discharge Plan: TBD    Vital Signs (last 3 days)       Date/Time Temp Pulse Resp BP MAP (mmHg) SpO2 Calculated FIO2 (%) - Nasal Cannula Nasal Cannula O2 Flow Rate (L/min) O2 Device Patient Position - Orthostatic VS Pain    01/06/25 2313 -- -- -- -- -- -- -- -- -- -- Med Not Given for Pain - for MAR use only    01/06/25 2300 100.2 °F (37.9 °C) 98 18 151/73 99 97 % -- -- Nasal cannula Lying --    01/06/25 1954 -- -- -- -- -- -- 32 3 L/min Nasal cannula -- No Pain    01/06/25 1807 -- -- -- -- -- 88 % -- -- None (Room air) -- --    01/06/25 14:56:08 99.9 °F (37.7 °C) 106 18 116/63 81 92 % -- -- -- -- --    01/06/25 1311 -- -- -- -- -- 91 % 24 1 L/min Nasal cannula -- --    01/06/25 1100 -- -- -- -- -- -- 24 1 L/min Nasal cannula -- --    01/06/25 0900 -- -- -- -- -- -- -- -- -- -- 7    01/06/25 0851 -- -- -- -- -- 90 % -- -- -- -- --    01/06/25 07:34:55 99.4 °F (37.4 °C) 104 18 140/89 106 99 % -- -- -- --  --    01/06/25 0717 -- -- -- -- -- 96 % 24 1 L/min Nasal cannula -- --    01/05/25 2219 -- -- -- -- -- -- -- -- -- -- Med Not Given for Pain - for MAR use only    01/05/25 22:16:36 99.4 °F (37.4 °C) 104 18 118/61 80 95 % -- -- -- -- --    01/05/25 1947 -- -- -- -- -- 93 % 24 1 L/min Nasal cannula -- --    01/05/25 19:06:10 100 °F (37.8 °C) 102 -- -- -- 93 % -- -- -- -- --    01/05/25 1703 -- -- -- -- -- -- -- -- -- -- 8    01/05/25 14:47:15 100.9 °F (38.3 °C) 116 -- 128/65 86 92 % -- -- -- -- --    01/05/25 1325 -- -- -- -- -- 92 % 24 1 L/min Nasal cannula -- --    01/05/25 0940 -- -- -- -- -- 90 % 24 1 L/min Nasal cannula -- No Pain    01/05/25 0713 -- -- -- -- -- 95 % 24 1 L/min Nasal cannula -- --    01/05/25 0700 98 °F (36.7 °C) -- -- 121/71 90 -- -- -- -- -- --    01/04/25 2351 -- -- -- -- -- -- -- -- -- -- Med Not Given for Pain - for MAR use only    01/04/25 23:11:09 100.4 °F (38 °C) 122 18 138/70 93 95 % 28 2 L/min Nasal cannula -- No Pain    01/04/25 2311 -- -- -- -- -- -- -- -- -- -- No Pain    01/04/25 2200 -- 115 19 117/56 80 99 % 28 2 L/min Nasal cannula -- --    01/04/25 2148 -- -- -- -- -- -- 28 2 L/min Nasal cannula -- --    01/04/25 2100 -- 112 21 118/67 85 97 % 28 2 L/min Nasal cannula -- --    01/04/25 2000 -- 116 22 120/54 78 96 % 28 2 L/min Nasal cannula -- --    01/04/25 1918 -- 116 -- -- -- -- -- -- -- -- --    01/04/25 1915 -- 149 21 125/67 90 96 % 28 2 L/min Nasal cannula -- --    01/04/25 1830 -- 104 20 -- -- 99 % 32 3 L/min Nasal cannula -- --    01/04/25 1734 98.2 °F (36.8 °C) 110 22 139/91 -- 90 % -- -- None (Room air) Sitting --              Pertinent Labs/Diagnostic Results:   Radiology:  XR chest 1 view portable   Final Interpretation by Carlyn Wright MD (01/04 1858)      No acute cardiopulmonary disease.      Right pneumonectomy.      Workstation performed: [id number]         CTA chest pe study    (Results Pending)     Cardiology:  ECG 12 lead   Final Result by Arben Anderson,  "MD (01/05 1709)   Sinus tachycardia   Otherwise normal ECG      Confirmed by Arben Anderson ([id number]) on 1/5/2025 5:08:58 PM      ECG 12 lead    by Interface, Yevgeniy Results In (01/04 1737)              Results from last 7 days   Lab Units 01/06/25 0428 01/05/25 0603 01/04/25 1817   WBC Thousand/uL 4.51 4.90 6.27   HEMOGLOBIN g/dL 10.3* 10.9* 12.2   HEMATOCRIT % 32.3* 33.3* 37.5   PLATELETS Thousands/uL 171 198 227   TOTAL NEUT ABS Thousands/µL 2.71 3.48 4.85         Results from last 7 days   Lab Units 01/07/25 0510 01/06/25 0428 01/05/25 0603 01/04/25 1817 01/02/25  1339   SODIUM mmol/L 140 139 139 139 142   POTASSIUM mmol/L 3.7 3.3* 3.3* 3.4* 3.6   CHLORIDE mmol/L 105 105 105 103 101   CO2 mmol/L 29 30 29 26 31   ANION GAP mmol/L 6 4 5 10 10   BUN mg/dL 15 18 22 21 23   CREATININE mg/dL 0.71 0.74 0.78 0.69 0.76   EGFR ml/min/1.73sq m 80 76 71 81 79   CALCIUM mg/dL 8.2* 8.6 8.7 9.0 8.8   MAGNESIUM mg/dL 1.8* 1.5*  --   --   --      Results from last 7 days   Lab Units 01/05/25 0603 01/04/25 1817 01/02/25  1339   AST U/L 14 14 13   ALT U/L 7 7 7   ALK PHOS U/L 75 83 83   TOTAL PROTEIN g/dL 6.1* 7.0 6.6   ALBUMIN g/dL 3.5 4.0 3.8   TOTAL BILIRUBIN mg/dL 0.39 0.50 0.4         Results from last 7 days   Lab Units 01/07/25 0510 01/06/25 0428 01/05/25 0603 01/04/25 1817 01/02/25  1339   GLUCOSE RANDOM mg/dL 86 94 117 99 84             No results found for: \"BETA-HYDROXYBUTYRATE\"                   Results from last 7 days   Lab Units 01/04/25  1817   HS TNI 0HR ng/L 8     Results from last 7 days   Lab Units 01/06/25  1403   D-DIMER QUANTITATIVE ug/ml FEU 1.03*     Results from last 7 days   Lab Units 01/04/25  1817   PROTIME seconds 12.5   INR  0.87   PTT seconds 27         Results from last 7 days   Lab Units 01/06/25  0428 01/04/25  1817   PROCALCITONIN ng/ml 0.08 <0.05     Results from last 7 days   Lab Units 01/04/25  1817   LACTIC ACID mmol/L 1.1             Results from last 7 days   Lab Units " 01/04/25  1817   BNP pg/mL 79                                                                     Results from last 7 days   Lab Units 01/04/25  1817   BLOOD CULTURE  No Growth at 48 hrs.  No Growth at 48 hrs.             Network Utilization Review Department  ATTENTION: Please call with any questions or concerns to [phone] and carefully listen to the prompts so that you are directed to the right person. All voicemails are confidential.   For Discharge needs, contact Care Management DC Support Team at [phone] opt. 2  Send all requests for admission clinical reviews, approved or denied determinations and any other requests to dedicated fax number below belonging to the campus where the patient is receiving treatment. List of dedicated fax numbers for the Facilities:  FACILITY NAME UR FAX NUMBER   ADMISSION DENIALS (Administrative/Medical Necessity) [phone]   DISCHARGE SUPPORT TEAM (NETWORK) [phone]   PARENT CHILD HEALTH (Maternity/NICU/Pediatrics) [phone]   [facility] [phone]   [facility] [phone]   [facility] [phone]   [facility] [phone]   [facility] [phone]   [facility] [phone]   [facility] [phone]   [facility] [phone]   [facility] [phone]   [facility] [phone]   [facility] [phone]   [facility] [place] [phone]

## 2025-01-07 NOTE — PLAN OF CARE
Problem: PAIN - ADULT  Goal: Verbalizes/displays adequate comfort level or baseline comfort level  Description: Interventions:  - Encourage patient to monitor pain and request assistance  - Assess pain using appropriate pain scale  - Administer analgesics based on type and severity of pain and evaluate response  - Implement non-pharmacological measures as appropriate and evaluate response  - Consider cultural and social influences on pain and pain management  - Notify physician/advanced practitioner if interventions unsuccessful or patient reports new pain  Outcome: Progressing     Problem: INFECTION - ADULT  Goal: Absence or prevention of progression during hospitalization  Description: INTERVENTIONS:  - Assess and monitor for signs and symptoms of infection  - Monitor lab/diagnostic results  - Monitor all insertion sites, i.e. indwelling lines, tubes, and drains  - Monitor endotracheal if appropriate and nasal secretions for changes in amount and color  - [place] appropriate cooling/warming therapies per order  - Administer medications as ordered  - Instruct and encourage patient and family to use good hand hygiene technique  - Identify and instruct in appropriate isolation precautions for identified infection/condition  Outcome: Progressing  Goal: Absence of fever/infection during neutropenic period  Description: INTERVENTIONS:  - Monitor WBC    Outcome: Progressing     Problem: SAFETY ADULT  Goal: Patient will remain free of falls  Description: INTERVENTIONS:  - Educate patient/family on patient safety including physical limitations  - Instruct patient to call for assistance with activity   - Consult OT/PT to assist with strengthening/mobility   - Keep Call bell within reach  - Keep bed low and locked with side rails adjusted as appropriate  - Keep care items and personal belongings within reach  - Initiate and maintain comfort rounds  - Make Fall Risk Sign visible to staff  - Offer Toileting every  Hours,  in advance of need  - Initiate/Maintain alarm  - Obtain necessary fall risk management equipment:   - Apply yellow socks and bracelet for high fall risk patients  - Consider moving patient to room near nurses station  Outcome: Progressing  Goal: Maintain or return to baseline ADL function  Description: INTERVENTIONS:  -  Assess patient's ability to carry out ADLs; assess patient's baseline for ADL function and identify physical deficits which impact ability to perform ADLs (bathing, care of mouth/teeth, toileting, grooming, dressing, etc.)  - Assess/evaluate cause of self-care deficits   - Assess range of motion  - Assess patient's mobility; develop plan if impaired  - Assess patient's need for assistive devices and provide as appropriate  - Encourage maximum independence but intervene and supervise when necessary  - Involve family in performance of ADLs  - Assess for home care needs following discharge   - Consider OT consult to assist with ADL evaluation and planning for discharge  - Provide patient education as appropriate  Outcome: Progressing  Goal: Maintains/Returns to pre admission functional level  Description: INTERVENTIONS:  - Perform AM-PAC 6 Click Basic Mobility/ Daily Activity assessment daily.  - Set and communicate daily mobility goal to care team and patient/family/caregiver.   - Collaborate with rehabilitation services on mobility goals if consulted  - Perform Range of Motion  times a day.  - Reposition patient every  hours.  - Dangle patient  times a day  - Stand patient  times a day  - Ambulate patient  times a day  - Out of bed to chair  times a day   - Out of bed for meal times a day  - Out of bed for toileting  - Record patient progress and toleration of activity level   Outcome: Progressing     Problem: DISCHARGE PLANNING  Goal: Discharge to home or other facility with appropriate resources  Description: INTERVENTIONS:  - Identify barriers to discharge w/patient and caregiver  - Arrange for  needed discharge resources and transportation as appropriate  - Identify discharge learning needs (meds, wound care, etc.)  - Arrange for interpretive services to assist at discharge as needed  - Refer to Case Management Department for coordinating discharge planning if the patient needs post-hospital services based on physician/advanced practitioner order or complex needs related to functional status, cognitive ability, or social support system  Outcome: Progressing     Problem: Knowledge Deficit  Goal: Patient/family/caregiver demonstrates understanding of disease process, treatment plan, medications, and discharge instructions  Description: Complete learning assessment and assess knowledge base.  Interventions:  - Provide teaching at level of understanding  - Provide teaching via preferred learning methods  Outcome: Progressing     Problem: RESPIRATORY - ADULT  Goal: Achieves optimal ventilation and oxygenation  Description: INTERVENTIONS:  - Assess for changes in respiratory status  - Assess for changes in mentation and behavior  - Position to facilitate oxygenation and minimize respiratory effort  - Oxygen administered by appropriate delivery if ordered  - Initiate smoking cessation education as indicated  - Encourage broncho-pulmonary hygiene including cough, deep breathe, Incentive Spirometry  - Assess the need for suctioning and aspirate as needed  - Assess and instruct to report SOB or any respiratory difficulty  - Respiratory Therapy support as indicated  Outcome: Progressing

## 2025-01-07 NOTE — PHYSICAL THERAPY NOTE
Physical Therapy Evaluation     Patient's Name: Francesca Retana    Admitting Diagnosis  Cough [R05.9]  Influenza B [J10.1]  Acute exacerbation of COPD with asthma (HCC) [J44.1]    Problem List  Patient Active Problem List   Diagnosis    Left renal mass    Multiple sclerosis (HCC)    Trigeminal neuralgia    History of lung cancer    Sepsis without acute organ dysfunction (HCC)    Bacteremia due to Proteus species    Neurogenic bladder    Acute respiratory failure with hypoxia (HCC)    History of pulmonary embolism    Lesion of left native kidney       Past Medical History  Past Medical History:   Diagnosis Date    Cancer (HCC)     only has the left lung     MS (multiple sclerosis) (HCC)     Neurogenic bladder     Trigeminal neuralgia        Past Surgical History  Past Surgical History:   Procedure Laterality Date    LUNG REMOVAL, TOTAL      right           01/07/25 1022   PT Last Visit   PT Visit Date 01/07/25   Note Type   Note type Evaluation   Pain Assessment   Pain Assessment Tool 0-10   Pain Score 4   Pain Location/Orientation Orientation: Left;Location: Hip   Restrictions/Precautions   Weight Bearing Precautions Per Order No   Other Precautions Contact/isolation;Droplet precautions;Chair Alarm;Bed Alarm;O2;Fall Risk;Multiple lines;Pain   Home Living   Type of Home Mobile home   Home Layout One level;Stairs to enter with rails;Performs ADLs on one level  (2 LUIS MANUEL)   Bathroom Shower/Tub Tub/shower unit   Bathroom Toilet Raised   Bathroom Equipment Grab bars in shower;Shower chair;Grab bars around toilet   Bathroom Accessibility Accessible   Home Equipment Walker;Cane;Other (Comment)  (oliva and RW)   Additional Comments cane vs no AD indoors, walker used outdoors for distances   Prior Function   Level of [place] Independent with ADLs;Independent with functional mobility;Independent with IADLS   Lives With Alone   Receives Help From Family;Neighbor   IADLs Independent with driving;Independent with meal  prep;Independent with medication management   Falls in the last 6 months 0   Vocational Retired   General   Family/Caregiver Present No   Cognition   Arousal/Participation Alert   Orientation Level Oriented X4   Memory Within functional limits   Following Commands Follows all commands and directions without difficulty   Comments pt agreeable to PT evaluation   RLE Assessment   RLE Assessment   (Grossly 4-/5 observed with functional mobility)   LLE Assessment   LLE Assessment   (Grossly 3+/5 observed with functional mobility)   Coordination   Movements are Fluid and Coordinated 0   Sensation X   Light Touch   RLE Light Touch Impaired   LLE Light Touch Impaired   Bed Mobility   Supine to Sit 4  Minimal assistance   Additional items Assist x 1;HOB elevated;Bedrails;Increased time required;Verbal cues   Transfers   Sit to Stand 4  Minimal assistance   Additional items Assist x 1;Armrests;Increased time required;Verbal cues   Stand to Sit 4  Minimal assistance   Additional items Assist x 1;Armrests;Increased time required;Verbal cues   Additional Comments pt reported dizziness upon seated at EOB, improved with rest break   Ambulation/Elevation   Gait pattern Decreased foot clearance;Short stride;Step to;Inconsistent eryn   Gait Assistance 4  Minimal assist   Additional items Assist x 1;Verbal cues   Assistive Device Rolling walker   Distance 20'   Balance   Static Sitting Fair +   Dynamic Sitting Fair   Static Standing Fair -   Dynamic Standing Poor +   Ambulatory Poor +   Endurance Deficit   Endurance Deficit Yes   Endurance Deficit Description SpO2 on 1L O2 NC remained > 90% during functional mobility   Activity Tolerance   Activity Tolerance Patient limited by fatigue   Medical Staff Made Aware Pt seen as a co-eval with OT due to the patient's co-morbidities, clinically unstable presentation, and present impairments which are a regression from the patient's baseline.  Dr. Moore cleared pt for PT/OT evaluations    Nurse Made Aware DEZ Wilson   Assessment   Prognosis Good   Problem List Decreased strength;Decreased endurance;Impaired balance;Decreased mobility;Decreased coordination;Impaired sensation;Pain   Assessment Pt is 79 y.o. female seen for PT evaluation on 1/7/2025 s/p admit to [facility] on 1/4/2025 w/ Acute respiratory failure with hypoxia (HCC). PT was consulted to assess pt's functional mobility and d/c needs. Order placed for PT eval and tx. PTA, pt resides alone in 1SH with +LUIS MANUEL, ambulates. At time of eval, pt requiring min assist for all phases of mobility, use of RW for household distance gait trial. Upon evaluation, pt presenting with impaired functional mobility d/t decreased strength, decreased endurance, impaired balance, decreased mobility, decreased coordination, impaired sensation, pain, and activity intolerance. Pertinent PMHx and current co-morbidities affecting pt's physical performance at time of assessment include: acute respiratory failure with hypoxia, h/o lung CA, sepsis, h/o PE, L renal mass, multiple sclerosis, trigeminal neuralgia, bacteremia, neurogenic bladder. Personal factors affecting pt at time of eval include: ambulating w/ assistive device, stairs to enter home, and inability to navigate community distances. The following objective measures performed on IE also reveal limitations: Barthel Index: 45/100, Modified [place]: 4 (moderate/severe disability), and AM-PAC 6-Clicks: 16/24. Pt's clinical presentation is currently unstable/unpredictable seen in pt's presentation of advanced age, abnormal lab value(s), and ongoing medical assessment. Overall, pt's rehab potential and prognosis to return to [facility] is good as impacted by objective findings, warranting pt to receive further skilled PT interventions to address identified impairments, activity limitation(s), and participation restriction(s). Pt to benefit from continued PT tx to address deficits as defined above and maximize level  of functional independent mobility. From PT/mobility standpoint, recommend level 2, moderate resource intensity in order to facilitate return to PLOF.   Barriers to Discharge Inaccessible home environment;Decreased caregiver support   Goals   Patient Goals to go home   STG Expiration Date 01/17/25   Short Term Goal #1 In 7-10 days: Increase bilateral LE strength 1/2 grade to facilitate independent mobility, Perform all bed mobility tasks modified independent to decrease caregiver burden, Perform all transfers modified independent to improve independence, Ambulate > 150 ft. with least restrictive assistive device modified independent w/o LOB and w/ normalized gait pattern 100% of the time, Navigate 2 stair(s) modified independent with unilateral handrail to facilitate return to previous living environment, and Increase all balance 1/2 grade to decrease risk for falls   PT Treatment Day 0   Plan   Treatment/Interventions Functional transfer training;LE strengthening/ROM;Elevations;Therapeutic exercise;Endurance training;Patient/family training;Equipment eval/education;Bed mobility;Gait training;Spoke to nursing;OT;Spoke to MD   PT Frequency 3-5x/wk   Discharge Recommendation   Rehab Resource Intensity Level, PT II (Moderate Resource Intensity)   AM-PAC Basic Mobility Inpatient   Turning in Flat Bed Without Bedrails 3   Lying on Back to Sitting on Edge of Flat Bed Without Bedrails 3   Moving Bed to Chair 3   Standing Up From Chair Using Arms 3   Walk in Room 2   Climb 3-5 Stairs With Railing 2   Basic Mobility Inpatient Raw Score 16   Basic Mobility Standardized Score 38.32   [facility] Highest Level Of Mobility   -[facility] Goal 5: Stand one or more mins   -HLM Achieved 6: Walk 10 steps or more   Modified [place] Scale   Modified [place] Scale 4   Barthel Index   Feeding 10   Bathing 0   Grooming Score 5   Dressing Score 5   Bladder Score 0   Bowels Score 10   Toilet Use Score 5   Transfers (Bed/Chair) Score 10    Mobility (Level Surface) Score 0   Stairs Score 0   Barthel Index Score 45         Carmita Hernandez, PT

## 2025-01-07 NOTE — PLAN OF CARE
Problem: PAIN - ADULT  Goal: Verbalizes/displays adequate comfort level or baseline comfort level  Description: Interventions:  - Encourage patient to monitor pain and request assistance  - Assess pain using appropriate pain scale  - Administer analgesics based on type and severity of pain and evaluate response  - Implement non-pharmacological measures as appropriate and evaluate response  - Consider cultural and social influences on pain and pain management  - Notify physician/advanced practitioner if interventions unsuccessful or patient reports new pain  Outcome: Progressing     Problem: INFECTION - ADULT  Goal: Absence or prevention of progression during hospitalization  Description: INTERVENTIONS:  - Assess and monitor for signs and symptoms of infection  - Monitor lab/diagnostic results  - Monitor all insertion sites, i.e. indwelling lines, tubes, and drains  - Monitor endotracheal if appropriate and nasal secretions for changes in amount and color  - Reeds appropriate cooling/warming therapies per order  - Administer medications as ordered  - Instruct and encourage patient and family to use good hand hygiene technique  - Identify and instruct in appropriate isolation precautions for identified infection/condition  Outcome: Progressing  Goal: Absence of fever/infection during neutropenic period  Description: INTERVENTIONS:  - Monitor WBC    Outcome: Progressing     Problem: SAFETY ADULT  Goal: Patient will remain free of falls  Description: INTERVENTIONS:  - Educate patient/family on patient safety including physical limitations  - Instruct patient to call for assistance with activity   - Consult OT/PT to assist with strengthening/mobility   - Keep Call bell within reach  - Keep bed low and locked with side rails adjusted as appropriate  - Keep care items and personal belongings within reach  - Initiate and maintain comfort rounds  - Make Fall Risk Sign visible to staff  - Offer Toileting every 2 Hours,  in advance of need  - Initiate/Maintain alarm  - Obtain necessary fall risk management equipment:   - Apply yellow socks and bracelet for high fall risk patients  - Consider moving patient to room near nurses station  Outcome: Progressing  Goal: Maintain or return to baseline ADL function  Description: INTERVENTIONS:  -  Assess patient's ability to carry out ADLs; assess patient's baseline for ADL function and identify physical deficits which impact ability to perform ADLs (bathing, care of mouth/teeth, toileting, grooming, dressing, etc.)  - Assess/evaluate cause of self-care deficits   - Assess range of motion  - Assess patient's mobility; develop plan if impaired  - Assess patient's need for assistive devices and provide as appropriate  - Encourage maximum independence but intervene and supervise when necessary  - Involve family in performance of ADLs  - Assess for home care needs following discharge   - Consider OT consult to assist with ADL evaluation and planning for discharge  - Provide patient education as appropriate  Outcome: Progressing  Goal: Maintains/Returns to pre admission functional level  Description: INTERVENTIONS:  - Perform AM-PAC 6 Click Basic Mobility/ Daily Activity assessment daily.  - Set and communicate daily mobility goal to care team and patient/family/caregiver.   - Collaborate with rehabilitation services on mobility goals if consulted  - Perform Range of Motion 3 times a day.  - Reposition patient every 2 hours.  - Dangle patient 3 times a day  - Stand patient 3 times a day  - Ambulate patient 3 times a day  - Out of bed to chair 3 times a day   - Out of bed for meals 3 times a day  - Out of bed for toileting  - Record patient progress and toleration of activity level   Outcome: Progressing     Problem: DISCHARGE PLANNING  Goal: Discharge to home or other facility with appropriate resources  Description: INTERVENTIONS:  - Identify barriers to discharge w/patient and caregiver  -  Arrange for needed discharge resources and transportation as appropriate  - Identify discharge learning needs (meds, wound care, etc.)  - Arrange for interpretive services to assist at discharge as needed  - Refer to Case Management Department for coordinating discharge planning if the patient needs post-hospital services based on physician/advanced practitioner order or complex needs related to functional status, cognitive ability, or social support system  Outcome: Progressing     Problem: Knowledge Deficit  Goal: Patient/family/caregiver demonstrates understanding of disease process, treatment plan, medications, and discharge instructions  Description: Complete learning assessment and assess knowledge base.  Interventions:  - Provide teaching at level of understanding  - Provide teaching via preferred learning methods  Outcome: Progressing     Problem: RESPIRATORY - ADULT  Goal: Achieves optimal ventilation and oxygenation  Description: INTERVENTIONS:  - Assess for changes in respiratory status  - Assess for changes in mentation and behavior  - Position to facilitate oxygenation and minimize respiratory effort  - Oxygen administered by appropriate delivery if ordered  - Initiate smoking cessation education as indicated  - Encourage broncho-pulmonary hygiene including cough, deep breathe, Incentive Spirometry  - Assess the need for suctioning and aspirate as needed  - Assess and instruct to report SOB or any respiratory difficulty  - Respiratory Therapy support as indicated  Outcome: Progressing

## 2025-01-07 NOTE — ASSESSMENT & PLAN NOTE
Presents to the ER 1//25 with cough/X 3 to 4 days  X-ray with no acute abnormalities, CBC and CMP unremarkable  Reportedly wheezing on exam, s/p methylprednisolone 125 mg in the ED plus nebs with some improvement but requiring 2 to 3 L nasal cannula oxygen  Not on steroids currently, has wheezing. Resp status improving so I will hold off on initiating steroids and will re-eval tomorrow  Tested positive influenza B  Continue Tamiflu,renally adjusted x 5 days [id number]  Continue Supportive measures including Tessalon Perles, Nebs, PRN Acetaminophen , PRN NC Oxygen ordered   Cont to Wean oxygen as tolerated  Started Hycodan as needed for cough.  Elevated dimer- pending results for CTA PE

## 2025-01-07 NOTE — RESPIRATORY THERAPY NOTE
RT Protocol Note  Francesca Retana 79 y.o. female MRN: [id number]  Unit/Bed#: 2 E 253-01 Encounter: [id number]    Assessment    Principal Problem:    Acute respiratory failure with hypoxia (HCC)  Active Problems:    History of lung cancer    Sepsis without acute organ dysfunction (HCC)    History of pulmonary embolism    Lesion of left native kidney      Home Pulmonary Medications:         Past Medical History:   Diagnosis Date    Cancer (HCC)     only has the left lung     MS (multiple sclerosis) (HCC)     Neurogenic bladder     Trigeminal neuralgia      Social History     Socioeconomic History    Marital status:      Spouse name: None    Number of children: None    Years of education: None    Highest education level: None   Occupational History    None   Tobacco Use    Smoking status: Former     Passive exposure: Past (as a teenager growing up)    Smokeless tobacco: Never   Vaping Use    Vaping status: Never Used   Substance and Sexual Activity    Alcohol use: Yes     Comment: Occ.    Drug use: Never    Sexual activity: Not Currently   Other Topics Concern    None   Social History Narrative    None     Social Drivers of Health     Financial Resource Strain: Low Risk  (3/12/2024)    [facility]    Overall Financial Resource Strain (CARDIA)     Difficulty of Paying Living Expenses: Not very hard   Food Insecurity: No Food Insecurity (1/4/2025)    Nursing - Inadequate Food Risk Classification     Worried About Running Out of Food in the Last Year: Never true     Ran Out of Food in the Last Year: Never true     Ran Out of Food in the Last Year: Never true   Transportation Needs: No Transportation Needs (1/4/2025)    Nursing - Transportation Risk Classification     Lack of Transportation: Not on file     Lack of Transportation: No   Physical Activity: Not on file   Stress: Patient Declined (3/12/2024)    Received from [facility],  "[facility]    [place] [employer] - Occupational Stress Questionnaire     Feeling of Stress : Patient declined   Social Connections: Feeling Socially Integrated (3/12/2024)    [facility] from [facility], [facility]    OASIS : Social Isolation     How often do you feel lonely or isolated from those around you?: Never   Intimate Partner Violence: Unknown (2025)    Nursing IPS     Feels Physically and Emotionally Safe: Not on file     Physically Hurt by Someone: Not on file     Humiliated or Emotionally Abused by Someone: Not on file     Physically Hurt by Someone: No     Hurt or Threatened by Someone: No   Housing Stability: Unknown (2025)    Nursing: Inadequate Housing Risk Classification     Has Housing: Not on file     Worried About Losing Housing: Not on file     Unable to Get Utilities: Not on file     Unable to Pay for Housing in the Last Year: No     Has Housin       Subjective         Objective    Physical Exam:   Assessment Type: Assess only  General Appearance: Drowsy  Respiratory Pattern: Normal  Chest Assessment: Chest expansion symmetrical  Bilateral Breath Sounds: Diminished    Vitals:  Blood pressure 143/80, pulse 96, temperature 98.6 °F (37 °C), resp. rate 17, height 5' 5\" (1.651 m), weight 59 kg (130 lb), SpO2 94%.          Imaging and other studies: Results Review Statement: No pertinent imaging studies reviewed.    O2 Device: nc     Plan    Respiratory Plan: Mild Distress pathway        Resp Comments: pt with hx of copd and lung ca, will continue with xop/atr tid   "

## 2025-01-07 NOTE — PROGRESS NOTES
Progress Note - Hospitalist   Name: Francesca Retana 79 y.o. female I MRN: [id number]  Unit/Bed#: 2 E 253-01 I Date of Admission: 1/4/2025   Date of Service: 1/7/2025 I Hospital Day: 2    Assessment & Plan  Acute respiratory failure with hypoxia (HCC)  Presents to the ER 1//25 with cough/X 3 to 4 days  X-ray with no acute abnormalities, CBC and CMP unremarkable  Reportedly wheezing on exam, s/p methylprednisolone 125 mg in the ED plus nebs with some improvement but requiring 2 to 3 L nasal cannula oxygen  Not on steroids currently, has wheezing. Resp status improving so I will hold off on initiating steroids and will re-eval tomorrow  Tested positive influenza B  Continue Tamiflu,renally adjusted x 5 days [id number]  Continue Supportive measures including Tessalon Perles, Nebs, PRN Acetaminophen , PRN NC Oxygen ordered   Cont to Wean oxygen as tolerated  Started Hycodan as needed for cough.  Elevated dimer- pending results for CTA PE    Sepsis without acute organ dysfunction (HCC)  Likely Viral sepsis  Patient with tachycardia and tachypnea.  Tested positive for influenza type B.  Started on Tamiflu.  Blood culture negative 48 hours  Lactic acid and procalcitonin within normal range.  Will continue treatment for influenza and supportive care  History of lung cancer  History of non-small cell lung cancer s/p right pneumonectomy November 2016, stage IIIa T4 N1 M0, with positive margins s/p adjuvant radiation with carboplatin and paclitaxel completed February 2017    Per chart review visit September 2024 with medical oncology patient reported weight loss 17 pounds and CT scan September 4, 2024 showed new 8 mm pleural-based nodule left lower lobe and a noted plan was for repeat imaging and follow-up ~  January 2025. Patient is aware and she has the CT scheduled next week as outpatient.     History of pulmonary embolism  History of large left PE s/p Eliquis  Followup on CTA PE  Lesion of left native kidney  CT  abdomen 10/8/2024 remarkable for 1.7 cm enhancing mass left posterior midpole concerning for renal cell carcinoma was recs for urology evaluation as per radiological report.      Patient is aware and noted that she saw urology already and has plan to repeat CT scan next week and follow up with urology and medical oncology after. Patient is established with medical oncology at [facility] last seen by Mildred Noyola PA-C 9/25/2024.     VTE Pharmacologic Prophylaxis: VTE Score: 12 High Risk (Score >/= 5) - Pharmacological DVT Prophylaxis Ordered: enoxaparin (Lovenox). Sequential Compression Devices Ordered.    Mobility:   Basic Mobility Inpatient Raw Score: 16  JH-HLM Goal: 5: Stand one or more mins  JH-HLM Achieved: 6: Walk 10 steps or more  JH-HLM Goal achieved. Continue to encourage appropriate mobility.    Patient Centered Rounds: I performed bedside rounds with nursing staff today.   Discussions with Specialists or Other Care Team Provider: CM    Education and Discussions with Family / Patient:  pt updated.     Current Length of Stay: 2 day(s)  Current Patient Status: Inpatient   Certification Statement: The patient will continue to require additional inpatient hospital stay due to ongoing sob and need for ATC nebs   Discharge Plan: Anticipate discharge in 24-48 hrs to home with home services.    Code Status: Level 1 - Full Code    Subjective   Pt seen and examined at bedside during am rounds  Pt c/o sob and is improving gradually  No fever  No acute events overnight  No new nursing concerns reported    Objective :  Temp:  [98.6 °F (37 °C)-100.2 °F (37.9 °C)] 98.6 °F (37 °C)  HR:  [] 96  BP: (116-151)/(63-80) 143/80  Resp:  [17-18] 17  SpO2:  [88 %-98 %] 97 %  O2 Device: Nasal cannula  Nasal Cannula O2 Flow Rate (L/min):  [1 L/min-3 L/min] 1 L/min    Body mass index is 21.63 kg/m².     Input and Output Summary (last 24 hours):     Intake/Output Summary (Last 24 hours) at 1/7/2025 1340  Last  data filed at 1/6/2025 1807  Gross per 24 hour   Intake 300 ml   Output --   Net 300 ml       Physical Exam  General- Awake, alert and oriented x 3, looks comfortable  HEENT- Normocephalic, atraumatic, oral mucosa- moist  Neck- Supple, No carotid bruit, no JVD  CVS- Normal S1/ S2, Regular rate and rhythm, No murmur, No edema  Respiratory system- B/L wheezing noted  Abdomen- Soft, Non distended, no tenderness, Bowel sound- present 4 quads  Genitourinary- No suprapubic tenderness, No CVA tenderness  Skin- No new bruise or rash  Musculoskeletal- No gross deformity  Psych- No acute psychosis  CNS- CN II- XII grossly intact, No acute focal neurologic deficit noted      Lines/Drains:              Lab Results: I have reviewed the following results:   Results from last 7 days   Lab Units 01/07/25  0510   WBC Thousand/uL 5.16   HEMOGLOBIN g/dL 10.6*   HEMATOCRIT % 33.2*   PLATELETS Thousands/uL 189   SEGS PCT % 54   LYMPHO PCT % 31   MONO PCT % 11   EOS PCT % 3     Results from last 7 days   Lab Units 01/07/25  0510 01/06/25  0428 01/05/25  0603   SODIUM mmol/L 140   < > 139   POTASSIUM mmol/L 3.7   < > 3.3*   CHLORIDE mmol/L 105   < > 105   CO2 mmol/L 29   < > 29   BUN mg/dL 15   < > 22   CREATININE mg/dL 0.71   < > 0.78   ANION GAP mmol/L 6   < > 5   CALCIUM mg/dL 8.2*   < > 8.7   ALBUMIN g/dL  --   --  3.5   TOTAL BILIRUBIN mg/dL  --   --  0.39   ALK PHOS U/L  --   --  75   ALT U/L  --   --  7   AST U/L  --   --  14   GLUCOSE RANDOM mg/dL 86   < > 117    < > = values in this interval not displayed.     Results from last 7 days   Lab Units 01/04/25  1817   INR  0.87             Results from last 7 days   Lab Units 01/06/25  0428 01/04/25 1817   LACTIC ACID mmol/L  --  1.1   PROCALCITONIN ng/ml 0.08 <0.05       Recent Cultures (last 7 days):   Results from last 7 days   Lab Units 01/04/25  1817   BLOOD CULTURE  No Growth at 48 hrs.  No Growth at 48 hrs.       Awaiting Cta PE to be read  Other Study Results Review: No  additional pertinent studies reviewed.    Last 24 Hours Medication List:     Current Facility-Administered Medications:     acetaminophen (TYLENOL) tablet 650 mg, Q8H PRN    albuterol (PROVENTIL HFA,VENTOLIN HFA) inhaler 2 puff, Q4H PRN    aspirin (ECOTRIN LOW STRENGTH) EC tablet 81 mg, Daily    benzonatate (TESSALON PERLES) capsule 100 mg, TID    carBAMazepine (TEGretol XR) 12 hr tablet 200 mg, BID    enoxaparin (LOVENOX) subcutaneous injection 40 mg, Daily    HYDROcodone Bit-Homatrop MBr (HYCODAN) oral syrup 5 mL, Q4H PRN    ipratropium (ATROVENT) 0.02 % inhalation solution 0.5 mg, TID    levalbuterol (XOPENEX) inhalation solution 1.25 mg, TID    oseltamivir (TAMIFLU) capsule 30 mg, Q12H JOHANA    Administrative Statements   Today, Patient Was Seen By: Felice Moore MD  I have spent a total time of 51 minutes in caring for this patient on the day of the visit/encounter including Diagnostic results, Prognosis, Risks and benefits of tx options, Instructions for management, Patient and family education, Importance of tx compliance, Risk factor reductions, Impressions, Counseling / Coordination of care, Documenting in the medical record, Reviewing / ordering tests, medicine, procedures  , Obtaining or reviewing history  , and Communicating with other healthcare professionals .    **Please Note: This note may have been constructed using a voice recognition system.**

## 2025-01-07 NOTE — OCCUPATIONAL THERAPY NOTE
Occupational Therapy Evaluation      Patient Name: Francesca Retana  Today's Date: 1/7/2025 01/07/25 1021   OT Last Visit   OT Visit Date 01/07/25   Note Type   Note type Evaluation   Pain Assessment   Pain Assessment Tool 0-10   Pain Score 4   Pain Location/Orientation Orientation: Left;Location: Hip   Restrictions/Precautions   Weight Bearing Precautions Per Order No   Other Precautions Contact/isolation;O2   Home Living   Type of Home Mobile home   Home Layout One level;Stairs to enter with rails;Performs ADLs on one level  (2 LUIS MANUEL)   Bathroom Shower/Tub Tub/shower unit   Bathroom Toilet Raised  (grab bars)   Bathroom Equipment Grab bars in shower;Shower chair   Bathroom Accessibility Accessible   Home Equipment Walker;Cane;Other (Comment)  (rollator)   Additional Comments cane vs no AD indoors, walker used outdoors for distances   Prior Function   Level of [place] Independent with ADLs;Independent with functional mobility;Independent with IADLS   Lives With Alone   Receives Help From Family;Neighbor   IADLs Independent with driving;Independent with meal prep;Independent with medication management   Falls in the last 6 months 0   Vocational Retired   Lifestyle   Autonomy Pt reported living alone in a mobile home with 2 LUIS MANUEL. Pt reported being independent with ADLs and IADLs PTA. Pt reported ambulating with cane vs no AD indoors and RW used for ambulating outdoors PTA.   Reciprocal Relationships Supportive Family   General   Family/Caregiver Present No   ADL   Where Assessed Edge of bed  (& Chair)   Eating Assistance 5  Supervision/Setup   Grooming Assistance 5  Supervision/Setup   UB Bathing Assistance 4  Minimal Assistance   LB Bathing Assistance 2  Maximal Assistance   UB Dressing Assistance 4  Minimal Assistance   UB Dressing Deficit Verbal cueing;Increased time to complete;Thread RUE;Thread LUE   LB Dressing Assistance 2  Maximal Assistance   LB Dressing Deficit Verbal  cueing;Supervision/safety;Increased time to complete;Don/doff L sock;Don/doff R sock   Toileting Assistance  4  Minimal Assistance   Functional Assistance 4  Minimal Assistance   Bed Mobility   Supine to Sit 4  Minimal assistance   Additional items Assist x 1;HOB elevated;Bedrails;Increased time required;Verbal cues   Transfers   Sit to Stand 4  Minimal assistance   Additional items Assist x 1;Armrests;Increased time required;Verbal cues   Stand to Sit 4  Minimal assistance   Additional items Assist x 1;Armrests;Increased time required;Verbal cues   Additional Comments pt reported dizziness upon seated at EOB, improved with rest break   Functional Mobility   Functional Mobility 4  Minimal assistance   Additional Comments assist x1 with RW and verbal cueing required when ambulating in room   Balance   Static Sitting Fair +   Dynamic Sitting Fair   Static Standing Fair -   Dynamic Standing Poor +   Ambulatory Poor +   Activity Tolerance   Activity Tolerance Patient limited by fatigue;Patient limited by pain   Medical Staff Made Aware Pt seen as a co-eval with PT Carmita due to the patient's co-morbidities, clinically unstable presentation, and present impairments which are a regression from the patient's baseline.   Nurse Made Aware DEZ bryan.   RUE Assessment   RUE Assessment WFL   LUE Assessment   LUE Assessment WFL   Hand Function   Gross Motor Coordination Functional   Fine Motor Coordination Functional   Sensation   Light Touch No apparent deficits  (BUEs)   Psychosocial   Psychosocial (WDL) X   Patient Behaviors/Mood Calm;Cooperative   Needs Expressed Denies   Ability to Express Feelings Able to express   Ability to Express Needs Able to express   Ability to Express Thoughts Able to express   Ability to Understand Others Understands   Length of Time/Family Visitation 31 min -1hr   Cognition   Overall Cognitive Status WFL   Arousal/Participation Alert;Responsive;Cooperative   Attention Within functional  limits   Orientation Level Oriented X4   Memory Within functional limits   Following Commands Follows all commands and directions without difficulty   Assessment   Limitation Decreased ADL status;Decreased Safe judgement during ADL;Decreased endurance;Decreased self-care trans;Decreased high-level ADLs   Prognosis Good   Assessment Patient is a 79 y.o. female seen for OT evaluation s/p admit to [facility] on 1/4/2025 w/Acute respiratory failure with hypoxia (HCC). Prior medical history and commorbidities affecting patient's functional performance at time of assessment include: acute respiratory failure with hypoxia, h/o lung CA, sepsis, h/o PE, L renal mass, multiple sclerosis, trigeminal neuralgia, bacteremia, neurogenic bladder. Orders placed for OT evaluation and treatment.  Performed at least two patient identifiers during session including name and wristband. Pt reported living alone in a mobile home with 2 LUIS MANUEL. Pt reported being independent with ADLs and IADLs PTA. Pt reported ambulating with cane vs no AD indoors and RW used for ambulating outdoors PTA.  Personal factors affecting patient at time of initial evaluation include: difficulty performing ADLs and difficulty performing IADLs. Upon evaluation, patient requires minimal  assist for UB ADLs, maximal assist for LB ADLs, transfers and functional ambulation in room and bathroom with minimal  assist x1, with the use of Rolling Walker.  Patient is oriented x 4.  Occupational performance is affected by the following deficits: dynamic sit/ stand balance deficit with poor standing tolerance time for self care and functional mobility, decreased activity tolerance, and decreased safety awareness.  Patient to benefit from continued Occupational Therapy treatment while in the hospital to address deficits as defined above and maximize level of functional independence with ADLs and functional mobility. Occupational Performance areas to address include:  grooming , bathing/ shower, dressing, toilet hygiene, transfer to all surfaces, and functional mobility. From OT standpoint, recommendation at time of d/c would be Level 2.   Plan   Treatment Interventions ADL retraining;Functional transfer training;Endurance training;Patient/family training;Compensatory technique education;Energy conservation;Activityengagement   Goal Expiration Date 01/21/25   OT Frequency 3-5x/wk   Discharge Recommendation   Rehab Resource Intensity Level, OT II (Moderate Resource Intensity)   AM-PAC Daily Activity Inpatient   Lower Body Dressing 2   Bathing 2   Toileting 2   Upper Body Dressing 3   Grooming 3   Eating 4   Daily Activity Raw Score 16   Daily Activity Standardized Score (Calc for Raw Score >=11) 35.96   AM-PAC Applied Cognition Inpatient   Following a Speech/Presentation 4   Understanding Ordinary Conversation 4   Taking Medications 4   Remembering Where Things Are Placed or Put Away 3   Remembering List of 4-5 Errands 3   Taking Care of Complicated Tasks 3   Applied Cognition Raw Score 21   Applied Cognition Standardized Score 44.3   Barthel Index   Feeding 10   Bathing 0   Grooming Score 5   Dressing Score 5   Bladder Score 0   Bowels Score 10   Toilet Use Score 5   Transfers (Bed/Chair) Score 10   Mobility (Level Surface) Score 0   Stairs Score 0   Barthel Index Score 45   Modified Frederick Scale   Modified Frederick Scale 4   End of Consult   Patient Position at End of Consult Bedside chair;Bed/Chair alarm activated;All needs within reach     Occupational therapy Goals: In 2-4 days    1- Patient will verbalize and demonstrate use of energy conservation/ deep breathing technique and work simplification skills during functional activity with no verbal cues.  2- Patient will verbalize and demonstrate good body mechanics and joint protection techniques during ADLs/ IADLs with no verbal cues.  3- Patient will increase OOB/ sitting tolerance to 4-6 hours per day for increased  participation in self care and leisure tasks with no s/s of exertion.  4- Patient will identify s/s of exertion during ADL and functional mobility with no verbal cues.  5-Patient will verbalize/ demonstrate compensatory strategies to recover from exertion with no verbal cues.   6-Patient will increase standing tolerance time to 10 minutes with No UE support to complete sink level ADLs @ Mod I level       CATALINA Sanders, OTR/L

## 2025-01-07 NOTE — PLAN OF CARE
Problem: PHYSICAL THERAPY ADULT  Goal: Performs mobility at highest level of function for planned discharge setting.  See evaluation for individualized goals.  Description: Treatment/Interventions: Functional transfer training, LE strengthening/ROM, Elevations, Therapeutic exercise, Endurance training, Patient/family training, Equipment eval/education, Bed mobility, Gait training, Spoke to nursing, OT, Spoke to MD          See flowsheet documentation for full assessment, interventions and recommendations.  1/7/2025 1101 by Carmita Hernandez PT  Note: Prognosis: Good  Problem List: Decreased strength, Decreased endurance, Impaired balance, Decreased mobility, Decreased coordination, Impaired sensation, Pain  Assessment: Pt is 79 y.o. female seen for PT evaluation on 1/7/2025 s/p admit to [facility] on 1/4/2025 w/ Acute respiratory failure with hypoxia (HCC). PT was consulted to assess pt's functional mobility and d/c needs. Order placed for PT eval and tx. PTA, pt resides alone in 1SH with +LUIS MANUEL, ambulates. At time of eval, pt requiring min assist for all phases of mobility, use of RW for household distance gait trial. Upon evaluation, pt presenting with impaired functional mobility d/t decreased strength, decreased endurance, impaired balance, decreased mobility, decreased coordination, impaired sensation, pain, and activity intolerance. Pertinent PMHx and current co-morbidities affecting pt's physical performance at time of assessment include: acute respiratory failure with hypoxia, h/o lung CA, sepsis, h/o PE, L renal mass, multiple sclerosis, trigeminal neuralgia, bacteremia, neurogenic bladder. Personal factors affecting pt at time of eval include: ambulating w/ assistive device, stairs to enter home, and inability to navigate community distances. The following objective measures performed on IE also reveal limitations: Barthel Index: 45/100, Modified Ralls: 4 (moderate/severe disability), and AM-PAC  6-Clicks: 16/24. Pt's clinical presentation is currently unstable/unpredictable seen in pt's presentation of advanced age, abnormal lab value(s), and ongoing medical assessment. Overall, pt's rehab potential and prognosis to return to PLOF is good as impacted by objective findings, warranting pt to receive further skilled PT interventions to address identified impairments, activity limitation(s), and participation restriction(s). Pt to benefit from continued PT tx to address deficits as defined above and maximize level of functional independent mobility. From PT/mobility standpoint, recommend level 2, moderate resource intensity in order to facilitate return to PLOF.  Barriers to Discharge: Inaccessible home environment, Decreased caregiver support     Rehab Resource Intensity Level, PT: II (Moderate Resource Intensity)    See flowsheet documentation for full assessment.     1/7/2025 1100 by Carmita Hernandez PT  Note: Prognosis: Good  Problem List: Decreased strength, Decreased endurance, Impaired balance, Decreased mobility, Decreased coordination, Impaired sensation, Pain  Assessment: Pt is 79 y.o. female seen for PT evaluation on 1/7/2025 s/p admit to [facility] on 1/4/2025 w/ Acute respiratory failure with hypoxia (HCC). PT was consulted to assess pt's functional mobility and d/c needs. Order placed for PT eval and tx. PTA, pt resides alone in 1SH with +LUIS MANUEL, ambulates. At time of eval, pt requiring min assist for all phases of mobility, use of RW for household distance gait trial. Upon evaluation, pt presenting with impaired functional mobility d/t decreased strength, decreased endurance, impaired balance, decreased mobility, decreased coordination, impaired sensation, pain, and activity intolerance. Pertinent PMHx and current co-morbidities affecting pt's physical performance at time of assessment include: acute respiratory failure with hypoxia, h/o lung CA, sepsis, h/o PE, L renal mass, multiple sclerosis,  trigeminal neuralgia, bacteremia, neurogenic bladder. Personal factors affecting pt at time of eval include: ambulating w/ assistive device, stairs to enter home, and inability to navigate community distances. The following objective measures performed on IE also reveal limitations: Barthel Index: 45/100, Modified Bynum: 4 (moderate/severe disability), and AM-PAC 6-Clicks: 16/24. Pt's clinical presentation is currently unstable/unpredictable seen in pt's presentation of advanced age, abnormal lab value(s), and ongoing medical assessment. Overall, pt's rehab potential and prognosis to return to PLOF is good as impacted by objective findings, warranting pt to receive further skilled PT interventions to address identified impairments, activity limitation(s), and participation restriction(s). Pt to benefit from continued PT tx to address deficits as defined above and maximize level of functional independent mobility. From PT/mobility standpoint, recommend level 2, moderate resource intensity in order to facilitate return to PLOF.  Barriers to Discharge: Inaccessible home environment, Decreased caregiver support     Rehab Resource Intensity Level, PT: II (Moderate Resource Intensity)    See flowsheet documentation for full assessment.

## 2025-01-07 NOTE — ASSESSMENT & PLAN NOTE
CT abdomen 10/8/2024 remarkable for 1.7 cm enhancing mass left posterior midpole concerning for renal cell carcinoma was recs for urology evaluation as per radiological report.      Patient is aware and noted that she saw urology already and has plan to repeat CT scan next week and follow up with urology and medical oncology after. Patient is established with medical oncology at [facility] last seen by Mildred Noyola PA-C 9/25/2024.    Quality 131: Pain Assessment And Follow-Up: No documentation of pain assessment, reason not given Quality 110: Preventive Care And Screening: Influenza Immunization: Influenza Immunization previously received during influenza season Detail Level: Detailed Quality 111:Pneumonia Vaccination Status For Older Adults: Pneumococcal Vaccination Previously Received

## 2025-01-07 NOTE — CASE MANAGEMENT
Case Management Assessment & Discharge Planning Note    Patient name Francesca Retana  Location 2 EAST 253/2 E 253-01 MRN [id number]  : 1945 Date 2025       Current Admission Date: 2025  Current Admission Diagnosis:Acute respiratory failure with hypoxia (HCC)   Patient Active Problem List    Diagnosis Date Noted Date Diagnosed    Acute respiratory failure with hypoxia (HCC) 2025     History of pulmonary embolism 2025     Lesion of left native kidney 2025     Sepsis without acute organ dysfunction (HCC) 10/16/2024     Bacteremia due to Proteus species 10/16/2024     Neurogenic bladder 10/16/2024     Left renal mass 10/15/2024     Multiple sclerosis (HCC) 10/15/2024     Trigeminal neuralgia 10/15/2024     History of lung cancer 10/15/2024       LOS (days): 2  Geometric Mean LOS (GMLOS) (days): 4.9  Days to GMLOS:2.8     OBJECTIVE:    Risk of Unplanned Readmission Score: 10.38      Current admission status: Inpatient     Preferred Pharmacy:   [id number] [address]  [address]  [facility] [address]  Phone: [phone] Fax: [phone]    Primary Care Provider: Jazz Tripp MD    Primary Insurance: [employer] [facility]  Secondary Insurance:     ASSESSMENT:  Active Health Care Proxies    There are no active Health Care Proxies on file.       Advance Directives  Does patient have a Health Care POA?: No  Does patient currently have a Health Care decision maker?: No  Does patient have Advance Directives?: No  Primary Contact: Lashanda Tracey (Daughter)   [phone] (M)    Readmission Root Cause  30 Day Readmission: No    Patient Information  Admitted from:: Home  Mental Status: Alert  Assessment information provided by:: Daughter (due to contact & droplet precautions)  Primary Caregiver: Self  Support Systems: Self, Friend, Children  County of Residence: [place]  What city do you live in?: [place]  Home entry access  options. Select all that apply.: Stairs  Number of steps to enter home.: 2  Type of Current Residence: [facility]  Living Arrangements: Lives Alone  Is patient a ?: No    Activities of Daily Living Prior to Admission  Functional Status: Independent  Completes ADLs independently?: Yes  Ambulates independently?: Yes  Does patient use assisted devices?: Yes  Assisted Devices (DME) used: Rollator, Straight Cane, Shower Chair  Does patient currently own DME?: Yes  What DME does the patient currently own?: Bedside Commode, Stair Chair/Glide, Straight Cane, Walker, Shower Chair  Does patient have a history of Outpatient Therapy (PT/OT)?: Yes  Does the patient have a history of Short-Term Rehab?: Yes  Does patient have a history of HHC?: Yes ([facility])  Does patient currently have HHC?: No    Patient Information Continued  Income Source:  (Retired)  Does patient have prescription coverage?: Yes  Does patient receive dialysis treatments?: No  Does patient have a history of substance abuse?: No  Does patient have a history of Mental Health Diagnosis?: No          DISCHARGE DETAILS:    Discharge planning discussed with:: Dtr via phone.  Freedom of Choice: Yes  Comments - Freedom of Choice: FOC maintained - CM attempted to reach patient at bedside (room phone [phone] & cell [phone]).  CM did not attempt in-person due to flu precautions.  Message left for patient requesting return call.  CM contacted dtrLashanda via phone ([phone]) to review Level II rec from PT/OT.  Per dtr, no changes to patient's living environment since prior admission.  Agreeable to blanket referrals for d/c planning options.  Dtr states patient will likely prefer d/c home w/ [facility].  CM will attempt to reach patient again.  Patient may require O2 upon d/c if wean is not successful.  CM contacted family/caregiver?: Yes  Were Treatment Team discharge recommendations reviewed with patient/caregiver?: Yes  Did patient/caregiver  verbalize understanding of patient care needs?: Yes  Were patient/caregiver advised of the risks associated with not following Treatment Team discharge recommendations?: Yes    Contacts  Patient Contacts: Lashanda Tracey (dtr)  Relationship to Patient:: Family  Contact Method: Phone  Phone Number: [phone]  Reason/Outcome: Continuity of Care, Discharge Planning, Referral    Requested Home Health Care         Is the patient interested in HHC at discharge?: Yes  Home Health Discipline requested:: Nursing, Occupational Therapy, Physical Therapy  Home Health Follow-Up Provider:: PCP (Jazz Tripp MD)  Home Health Services Needed:: Strengthening/Theraputic Exercises to Improve Function, Evaluate Functional Status and Safety, Gait/ADL Training  Oxygen LPM Ordered (if applicable based on home health services needed):: 1 LPM  Homebound Criteria Met:: Uses an Assist Device (i.e. cane, walker, etc)  Supporting Clincal Findings:: Requires Oxygen, Limited Endurance, Fatigues Easliy in Short Distances    DME Referral Provided  Referral made for DME?: No (O2 TBD)    Other Referral/Resources/Interventions Provided:  Interventions: Short Term Rehab, HHC  Referral Comments: See FOC for details.  [facility] referrals opened for HHC and STR.    Treatment Team Recommendation: Short Term Rehab, Home with Home Health Care

## 2025-01-08 ENCOUNTER — HOME HEALTH ADMISSION (OUTPATIENT)
Dept: HOME HEALTH SERVICES | Facility: HOME HEALTHCARE | Age: 80
End: 2025-01-08
Payer: COMMERCIAL

## 2025-01-08 VITALS
HEIGHT: 65 IN | OXYGEN SATURATION: 94 % | WEIGHT: 130 LBS | SYSTOLIC BLOOD PRESSURE: 146 MMHG | DIASTOLIC BLOOD PRESSURE: 85 MMHG | RESPIRATION RATE: 17 BRPM | TEMPERATURE: 97.8 F | BODY MASS INDEX: 21.66 KG/M2 | HEART RATE: 96 BPM

## 2025-01-08 PROBLEM — J20.8 ACUTE VIRAL BRONCHITIS: Status: ACTIVE | Noted: 2025-01-08

## 2025-01-08 PROBLEM — J10.1 INFLUENZA B: Status: ACTIVE | Noted: 2025-01-08

## 2025-01-08 LAB
ANION GAP SERPL CALCULATED.3IONS-SCNC: 6 MMOL/L (ref 4–13)
BASOPHILS # BLD AUTO: 0.04 THOUSANDS/ΜL (ref 0–0.1)
BASOPHILS NFR BLD AUTO: 1 % (ref 0–1)
BUN SERPL-MCNC: 16 MG/DL (ref 5–25)
CALCIUM SERPL-MCNC: 8.6 MG/DL (ref 8.4–10.2)
CHLORIDE SERPL-SCNC: 102 MMOL/L (ref 96–108)
CO2 SERPL-SCNC: 30 MMOL/L (ref 21–32)
CREAT SERPL-MCNC: 0.68 MG/DL (ref 0.6–1.3)
EOSINOPHIL # BLD AUTO: 0.13 THOUSAND/ΜL (ref 0–0.61)
EOSINOPHIL NFR BLD AUTO: 3 % (ref 0–6)
ERYTHROCYTE [DISTWIDTH] IN BLOOD BY AUTOMATED COUNT: 15.2 % (ref 11.6–15.1)
GFR SERPL CREATININE-BSD FRML MDRD: 82 ML/MIN/1.73SQ M
GLUCOSE SERPL-MCNC: 94 MG/DL (ref 65–140)
HCT VFR BLD AUTO: 37.1 % (ref 34.8–46.1)
HGB BLD-MCNC: 11.4 G/DL (ref 11.5–15.4)
IMM GRANULOCYTES # BLD AUTO: 0.03 THOUSAND/UL (ref 0–0.2)
IMM GRANULOCYTES NFR BLD AUTO: 1 % (ref 0–2)
LYMPHOCYTES # BLD AUTO: 1.3 THOUSANDS/ΜL (ref 0.6–4.47)
LYMPHOCYTES NFR BLD AUTO: 25 % (ref 14–44)
MCH RBC QN AUTO: 30.7 PG (ref 26.8–34.3)
MCHC RBC AUTO-ENTMCNC: 30.7 G/DL (ref 31.4–37.4)
MCV RBC AUTO: 100 FL (ref 82–98)
MONOCYTES # BLD AUTO: 0.5 THOUSAND/ΜL (ref 0.17–1.22)
MONOCYTES NFR BLD AUTO: 10 % (ref 4–12)
NEUTROPHILS # BLD AUTO: 3.2 THOUSANDS/ΜL (ref 1.85–7.62)
NEUTS SEG NFR BLD AUTO: 60 % (ref 43–75)
NRBC BLD AUTO-RTO: 0 /100 WBCS
PLATELET # BLD AUTO: 181 THOUSANDS/UL (ref 149–390)
PMV BLD AUTO: 10.3 FL (ref 8.9–12.7)
POTASSIUM SERPL-SCNC: 3.7 MMOL/L (ref 3.5–5.3)
RBC # BLD AUTO: 3.71 MILLION/UL (ref 3.81–5.12)
SODIUM SERPL-SCNC: 138 MMOL/L (ref 135–147)
WBC # BLD AUTO: 5.2 THOUSAND/UL (ref 4.31–10.16)

## 2025-01-08 PROCEDURE — 97116 GAIT TRAINING THERAPY: CPT

## 2025-01-08 PROCEDURE — 94640 AIRWAY INHALATION TREATMENT: CPT

## 2025-01-08 PROCEDURE — 94760 N-INVAS EAR/PLS OXIMETRY 1: CPT

## 2025-01-08 PROCEDURE — 80048 BASIC METABOLIC PNL TOTAL CA: CPT | Performed by: FAMILY MEDICINE

## 2025-01-08 PROCEDURE — 97110 THERAPEUTIC EXERCISES: CPT

## 2025-01-08 PROCEDURE — 94761 N-INVAS EAR/PLS OXIMETRY MLT: CPT

## 2025-01-08 PROCEDURE — 85025 COMPLETE CBC W/AUTO DIFF WBC: CPT | Performed by: FAMILY MEDICINE

## 2025-01-08 PROCEDURE — 99239 HOSP IP/OBS DSCHRG MGMT >30: CPT | Performed by: FAMILY MEDICINE

## 2025-01-08 RX ORDER — DOCUSATE SODIUM 100 MG/1
100 CAPSULE, LIQUID FILLED ORAL 2 TIMES DAILY
Status: DISCONTINUED | OUTPATIENT
Start: 2025-01-08 | End: 2025-01-08 | Stop reason: HOSPADM

## 2025-01-08 RX ORDER — PREDNISONE 20 MG/1
TABLET ORAL
Qty: 15 TABLET | Refills: 0 | Status: SHIPPED | OUTPATIENT
Start: 2025-01-08 | End: 2025-01-20

## 2025-01-08 RX ORDER — POLYETHYLENE GLYCOL 3350 17 G/17G
17 POWDER, FOR SOLUTION ORAL DAILY
Status: DISCONTINUED | OUTPATIENT
Start: 2025-01-08 | End: 2025-01-08 | Stop reason: HOSPADM

## 2025-01-08 RX ORDER — OSELTAMIVIR PHOSPHATE 30 MG/1
30 CAPSULE ORAL EVERY 12 HOURS SCHEDULED
Qty: 2 CAPSULE | Refills: 0 | Status: SHIPPED | OUTPATIENT
Start: 2025-01-08 | End: 2025-01-09

## 2025-01-08 RX ORDER — IPRATROPIUM BROMIDE 17 UG/1
2 AEROSOL, METERED RESPIRATORY (INHALATION) 2 TIMES DAILY
Qty: 12.9 G | Refills: 0 | Status: SHIPPED | OUTPATIENT
Start: 2025-01-08 | End: 2025-02-07

## 2025-01-08 RX ORDER — BENZONATATE 100 MG/1
100 CAPSULE ORAL 3 TIMES DAILY
Qty: 20 CAPSULE | Refills: 0 | Status: SHIPPED | OUTPATIENT
Start: 2025-01-08 | End: 2025-01-09

## 2025-01-08 RX ORDER — HYDROCODONE BITARTRATE AND HOMATROPINE METHYLBROMIDE ORAL SOLUTION 5; 1.5 MG/5ML; MG/5ML
5 LIQUID ORAL EVERY 4 HOURS PRN
Qty: 120 ML | Refills: 0 | Status: SHIPPED | OUTPATIENT
Start: 2025-01-08 | End: 2025-01-18

## 2025-01-08 RX ORDER — PREDNISONE 20 MG/1
40 TABLET ORAL DAILY
Status: DISCONTINUED | OUTPATIENT
Start: 2025-01-08 | End: 2025-01-08 | Stop reason: HOSPADM

## 2025-01-08 RX ORDER — ALBUTEROL SULFATE 0.83 MG/ML
2.5 SOLUTION RESPIRATORY (INHALATION) EVERY 6 HOURS PRN
Qty: 168 ML | Refills: 0 | Status: SHIPPED | OUTPATIENT
Start: 2025-01-08 | End: 2025-01-22

## 2025-01-08 RX ADMIN — DOCUSATE SODIUM 100 MG: 100 CAPSULE, LIQUID FILLED ORAL at 11:34

## 2025-01-08 RX ADMIN — IPRATROPIUM BROMIDE 0.5 MG: 0.5 SOLUTION RESPIRATORY (INHALATION) at 07:55

## 2025-01-08 RX ADMIN — ENOXAPARIN SODIUM 40 MG: 40 INJECTION SUBCUTANEOUS at 08:36

## 2025-01-08 RX ADMIN — BENZONATATE 100 MG: 100 CAPSULE ORAL at 08:36

## 2025-01-08 RX ADMIN — LEVALBUTEROL HYDROCHLORIDE 1.25 MG: 1.25 SOLUTION RESPIRATORY (INHALATION) at 07:55

## 2025-01-08 RX ADMIN — PREDNISONE 40 MG: 20 TABLET ORAL at 10:15

## 2025-01-08 RX ADMIN — POLYETHYLENE GLYCOL 3350 17 G: 17 POWDER, FOR SOLUTION ORAL at 11:34

## 2025-01-08 RX ADMIN — ASPIRIN 81 MG: 81 TABLET, COATED ORAL at 08:36

## 2025-01-08 RX ADMIN — OSELTAMIVIR PHOSPHATE 30 MG: 30 CAPSULE ORAL at 08:36

## 2025-01-08 RX ADMIN — CARBAMAZEPINE 200 MG: 100 TABLET, EXTENDED RELEASE ORAL at 08:36

## 2025-01-08 RX ADMIN — ACETAMINOPHEN 650 MG: 325 TABLET, FILM COATED ORAL at 08:36

## 2025-01-08 NOTE — DISCHARGE INSTR - AVS FIRST PAGE
Followup with your PCP in one week  Use nebulizer machine for next couple weeks for albuterol inhalation to help with wheezing  Also use atrovent inhaler twice a day   Take prednisone as prescribed in a tapering fashion as ordered

## 2025-01-08 NOTE — PLAN OF CARE
Problem: PAIN - ADULT  Goal: Verbalizes/displays adequate comfort level or baseline comfort level  Description: Interventions:  - Encourage patient to monitor pain and request assistance  - Assess pain using appropriate pain scale  - Administer analgesics based on type and severity of pain and evaluate response  - Implement non-pharmacological measures as appropriate and evaluate response  - Consider cultural and social influences on pain and pain management  - Notify physician/advanced practitioner if interventions unsuccessful or patient reports new pain  Outcome: Progressing     Problem: INFECTION - ADULT  Goal: Absence or prevention of progression during hospitalization  Description: INTERVENTIONS:  - Assess and monitor for signs and symptoms of infection  - Monitor lab/diagnostic results  - Monitor all insertion sites, i.e. indwelling lines, tubes, and drains  - Monitor endotracheal if appropriate and nasal secretions for changes in amount and color  - [place] appropriate cooling/warming therapies per order  - Administer medications as ordered  - Instruct and encourage patient and family to use good hand hygiene technique  - Identify and instruct in appropriate isolation precautions for identified infection/condition  Outcome: Progressing  Goal: Absence of fever/infection during neutropenic period  Description: INTERVENTIONS:  - Monitor WBC    Outcome: Progressing     Problem: SAFETY ADULT  Goal: Patient will remain free of falls  Description: INTERVENTIONS:  - Educate patient/family on patient safety including physical limitations  - Instruct patient to call for assistance with activity   - Consult OT/PT to assist with strengthening/mobility   - Keep Call bell within reach  - Keep bed low and locked with side rails adjusted as appropriate  - Keep care items and personal belongings within reach  - Initiate and maintain comfort rounds  - Make Fall Risk Sign visible to staff  - Offer Toileting every 2 Hours,  in advance of need  - Initiate/Maintain alarm  - Obtain necessary fall risk management equipment:   - Apply yellow socks and bracelet for high fall risk patients  - Consider moving patient to room near nurses station  Outcome: Progressing  Goal: Maintain or return to baseline ADL function  Description: INTERVENTIONS:  -  Assess patient's ability to carry out ADLs; assess patient's baseline for ADL function and identify physical deficits which impact ability to perform ADLs (bathing, care of mouth/teeth, toileting, grooming, dressing, etc.)  - Assess/evaluate cause of self-care deficits   - Assess range of motion  - Assess patient's mobility; develop plan if impaired  - Assess patient's need for assistive devices and provide as appropriate  - Encourage maximum independence but intervene and supervise when necessary  - Involve family in performance of ADLs  - Assess for home care needs following discharge   - Consider OT consult to assist with ADL evaluation and planning for discharge  - Provide patient education as appropriate  Outcome: Progressing  Goal: Maintains/Returns to pre admission functional level  Description: INTERVENTIONS:  - Perform AM-PAC 6 Click Basic Mobility/ Daily Activity assessment daily.  - Set and communicate daily mobility goal to care team and patient/family/caregiver.   - Collaborate with rehabilitation services on mobility goals if consulted  - Perform Range of Motion 3 times a day.  - Reposition patient every 2 hours.  - Dangle patient 3 times a day  - Stand patient 3 times a day  - Ambulate patient 3 times a day  - Out of bed to chair 3 times a day   - Out of bed for meals 3 times a day  - Out of bed for toileting  - Record patient progress and toleration of activity level   Outcome: Progressing     Problem: DISCHARGE PLANNING  Goal: Discharge to home or other facility with appropriate resources  Description: INTERVENTIONS:  - Identify barriers to discharge w/patient and caregiver  -  Arrange for needed discharge resources and transportation as appropriate  - Identify discharge learning needs (meds, wound care, etc.)  - Arrange for interpretive services to assist at discharge as needed  - Refer to Case Management Department for coordinating discharge planning if the patient needs post-hospital services based on physician/advanced practitioner order or complex needs related to functional status, cognitive ability, or social support system  Outcome: Progressing     Problem: Knowledge Deficit  Goal: Patient/family/caregiver demonstrates understanding of disease process, treatment plan, medications, and discharge instructions  Description: Complete learning assessment and assess knowledge base.  Interventions:  - Provide teaching at level of understanding  - Provide teaching via preferred learning methods  Outcome: Progressing     Problem: RESPIRATORY - ADULT  Goal: Achieves optimal ventilation and oxygenation  Description: INTERVENTIONS:  - Assess for changes in respiratory status  - Assess for changes in mentation and behavior  - Position to facilitate oxygenation and minimize respiratory effort  - Oxygen administered by appropriate delivery if ordered  - Initiate smoking cessation education as indicated  - Encourage broncho-pulmonary hygiene including cough, deep breathe, Incentive Spirometry  - Assess the need for suctioning and aspirate as needed  - Assess and instruct to report SOB or any respiratory difficulty  - Respiratory Therapy support as indicated  Outcome: Progressing

## 2025-01-08 NOTE — PROGRESS NOTES
Patient:  MAGI JEFFERS    MRN:  [id number]    [place] Request ID:  [id number]    Level of care reserved:  Home Health Agency    Partner Reserved:  [facility], [place] [address] [phone]    Clinical needs requested:    Geography searched:  [phone]    Start of Service:    Request sent:  3:19pm EST on 1/7/2025 by Shilpa Brannon    Partner reserved:  9:37am EST on 1/8/2025 by Shilpa Brannon    Choice list shared:  9:36am EST on 1/8/2025 by Shilpa Brannon

## 2025-01-08 NOTE — ASSESSMENT & PLAN NOTE
CT abdomen 10/8/2024 remarkable for 1.7 cm enhancing mass left posterior midpole concerning for renal cell carcinoma was recs for urology evaluation as per radiological report.      Patient is aware and noted that she saw urology already and has plan to repeat CT scan next week and follow up with urology and medical oncology after. Patient is established with medical oncology at [facility] last seen by Mildred Noyola PA-C 9/25/2024.

## 2025-01-08 NOTE — NURSING NOTE
Ambulated patient 25 ft without oxygen, patients spo2 dropped to 87% on RM air. Patient placed on 1L NC, spo2 is currently 91%. Patient is resting comfortable with no SOB.    Katie Witt RN  01/08/25  10:24 AM

## 2025-01-08 NOTE — PLAN OF CARE
Problem: PHYSICAL THERAPY ADULT  Goal: Performs mobility at highest level of function for planned discharge setting.  See evaluation for individualized goals.  Description: Treatment/Interventions: Functional transfer training, LE strengthening/ROM, Elevations, Therapeutic exercise, Endurance training, Patient/family training, Equipment eval/education, Bed mobility, Gait training, Spoke to nursing, OT, Spoke to MD          See flowsheet documentation for full assessment, interventions and recommendations.  Outcome: Progressing  Note: Prognosis: Good  Problem List: Decreased strength, Decreased endurance, Impaired balance, Decreased mobility, Decreased coordination, Impaired sensation, Pain  Assessment: Pt seen for PT treatment session this date, consisting of ther ex focused on strengthening and gt training on level surfaces to improve pt safety in household environment. Since previous session, pt has made good progress in terms of increased level surface gait trial with use of RW, initiation of LB exercises with vc for technique. Current goals and POC established on IE remain appropriate, pt continues to have rehab potential and is making good progress towards STGs. Pt prognosis for achieving goals is good, pending pt progress with hospitalization/medical status improvements, and indicated by Stimulability and ability to follow directions. Pt continues to be functioning below baseline level, and remains limited 2* factors listed above. PT will continue to see pt during current hospitalization in order to address the deficits listed above and provide interventions consistent w/ POC in effort to achieve STGs. PT recommends level 2, moderate resource intensity upon discharge.  Barriers to Discharge: Inaccessible home environment, Decreased caregiver support     Rehab Resource Intensity Level, PT: II (Moderate Resource Intensity)    See flowsheet documentation for full assessment.

## 2025-01-08 NOTE — RESPIRATORY THERAPY NOTE
Home Oxygen Qualifying Test     Patient name: Francesca Retana        : 1945   Date of Test:  2025  Diagnosis: ARF w/ hypoxia   Home Oxygen Test:    **Medicare Guidelines require item(s) 1-5 on all ambulatory patients or 1 and 2 on non-ambulatory patients.    1. Baseline SPO2 on Room Air at rest 93 %   If <= 88% on Room Air add O2 via NC to obtain SpO2 >=88%. If LPM needed, document LPM N/A needed to reach =>88%    SPO2 during exertion on Room Air 90  %  During exertion monitor SPO2. If SPO2 increases >=89%, do not add supplemental oxygen    SPO2 on Oxygen at Rest N/A % at N/A LPM    SPO2 during exertion on Oxygen N/A% at N/A LPM    Test performed during exertion activity.      []  Supplemental Home Oxygen is indicated.    [x]  Client does not qualify for home oxygen.    Respiratory Additional Notes- Pt ambulated in room w/ RW and tolerated well.  Pt does not qualify for home O2 at this time.    Martine Teague

## 2025-01-08 NOTE — PLAN OF CARE
Problem: PAIN - ADULT  Goal: Verbalizes/displays adequate comfort level or baseline comfort level  Description: Interventions:  - Encourage patient to monitor pain and request assistance  - Assess pain using appropriate pain scale  - Administer analgesics based on type and severity of pain and evaluate response  - Implement non-pharmacological measures as appropriate and evaluate response  - Consider cultural and social influences on pain and pain management  - Notify physician/advanced practitioner if interventions unsuccessful or patient reports new pain  Outcome: Progressing         Problem: INFECTION - ADULT  Goal: Absence or prevention of progression during hospitalization  Description: INTERVENTIONS:  - Assess and monitor for signs and symptoms of infection  - Monitor lab/diagnostic results  - Monitor all insertion sites, i.e. indwelling lines, tubes, and drains  - Monitor endotracheal if appropriate and nasal secretions for changes in amount and color  - [place] appropriate cooling/warming therapies per order  - Administer medications as ordered  - Instruct and encourage patient and family to use good hand hygiene technique  - Identify and instruct in appropriate isolation precautions for identified infection/condition  Outcome: Progressing  Goal: Absence of fever/infection during neutropenic period  Description: INTERVENTIONS:  - Monitor WBC    Outcome: Progressing         Problem: SAFETY ADULT  Goal: Patient will remain free of falls  Description: INTERVENTIONS:  - Educate patient/family on patient safety including physical limitations  - Instruct patient to call for assistance with activity   - Consult OT/PT to assist with strengthening/mobility   - Keep Call bell within reach  - Keep bed low and locked with side rails adjusted as appropriate  - Keep care items and personal belongings within reach  - Initiate and maintain comfort rounds  - Make Fall Risk Sign visible to staff  - Offer Toileting every 2  Hours, in advance of need  - Initiate/Maintain bed alarm  - Obtain necessary fall risk management equipment:   - Apply yellow socks and bracelet for high fall risk patients  - Consider moving patient to room near nurses station  Outcome: Progressing     Problem: SAFETY ADULT  Goal: Maintain or return to baseline ADL function  Description: INTERVENTIONS:  -  Assess patient's ability to carry out ADLs; assess patient's baseline for ADL function and identify physical deficits which impact ability to perform ADLs (bathing, care of mouth/teeth, toileting, grooming, dressing, etc.)  - Assess/evaluate cause of self-care deficits   - Assess range of motion  - Assess patient's mobility; develop plan if impaired  - Assess patient's need for assistive devices and provide as appropriate  - Encourage maximum independence but intervene and supervise when necessary  - Involve family in performance of ADLs  - Assess for home care needs following discharge   - Consider OT consult to assist with ADL evaluation and planning for discharge  - Provide patient education as appropriate  Outcome: Progressing        Problem: SAFETY ADULT  Goal: Maintains/Returns to pre admission functional level  Description: INTERVENTIONS:  - Perform AM-PAC 6 Click Basic Mobility/ Daily Activity assessment daily.  - Set and communicate daily mobility goal to care team and patient/family/caregiver.   - Collaborate with rehabilitation services on mobility goals if consulted  - Perform Range of Motion 4 times a day.  - Reposition patient every 2 hours.  - Dangle patient 3 times a day  - Stand patient 3 times a day  - Ambulate patient 3 times a day  - Out of bed to chair 3 times a day   - Out of bed for meals 3 times a day  - Out of bed for toileting  - Record patient progress and toleration of activity level   Outcome: Progressing        Problem: RESPIRATORY - ADULT  Goal: Achieves optimal ventilation and oxygenation  Description: INTERVENTIONS:  - Assess for  changes in respiratory status  - Assess for changes in mentation and behavior  - Position to facilitate oxygenation and minimize respiratory effort  - Oxygen administered by appropriate delivery if ordered  - Initiate smoking cessation education as indicated  - Encourage broncho-pulmonary hygiene including cough, deep breathe, Incentive Spirometry  - Assess the need for suctioning and aspirate as needed  - Assess and instruct to report SOB or any respiratory difficulty  - Respiratory Therapy support as indicated  Outcome: Progressing

## 2025-01-08 NOTE — QUICK NOTE
Received notification via secure chat regarding result of CTA PE study.  CTA PE study is negative for PE, there is groundglass opacities in the left lower lobe which could represent infectious/inflammatory process likely due to influenza B, continue with plan of care.

## 2025-01-08 NOTE — PHYSICAL THERAPY NOTE
Physical Therapy Treatment Note       01/08/25 1033   PT Last Visit   PT Visit Date 01/08/25   Note Type   Note Type Treatment   Pain Assessment   Pain Assessment Tool 0-10   Pain Score No Pain   Restrictions/Precautions   Weight Bearing Precautions Per Order No   Other Precautions Contact/isolation;Chair Alarm;Bed Alarm;O2;Fall Risk;Droplet precautions   General   Chart Reviewed Yes   Response to Previous Treatment Patient with no complaints from previous session.   Family/Caregiver Present No   Cognition   Arousal/Participation Alert;Responsive;Cooperative   Attention Within functional limits   Orientation Level Oriented X4   Memory Within functional limits   Following Commands Follows all commands and directions without difficulty   Comments pt agreeable to PT session   Bed Mobility   Supine to Sit 4  Minimal assistance   Additional items Assist x 1;HOB elevated;Bedrails;Increased time required;Verbal cues   Transfers   Sit to Stand 4  Minimal assistance   Additional items Assist x 1;Armrests;Increased time required;Verbal cues   Stand to Sit 4  Minimal assistance   Additional items Assist x 1;Armrests;Increased time required;Verbal cues   Ambulation/Elevation   Gait pattern Decreased foot clearance;Short stride;Step to;Inconsistent eryn   Gait Assistance 4  Minimal assist   Additional items Assist x 1;Verbal cues   Assistive Device Rolling walker   Distance 50'   Balance   Static Sitting Fair +   Dynamic Sitting Fair   Static Standing Fair -   Dynamic Standing Poor +   Ambulatory Poor +   Endurance Deficit   Endurance Deficit Yes   Endurance Deficit Description SPO2 dropped to 87% on RA during level surface gait trial, RN aware   Activity Tolerance   Activity Tolerance Patient limited by fatigue   Nurse Made Aware DEZ Wilson   Exercises   Hip Abduction Sitting;10 reps;AROM;Bilateral   Knee AROM Long Arc Quad Sitting;10 reps;AROM;Bilateral   Ankle Pumps Sitting;10 reps;AROM;Bilateral   Marching Sitting;10  reps;AROM;Bilateral   Assessment   Prognosis Good   Problem List Decreased strength;Decreased endurance;Impaired balance;Decreased mobility;Decreased coordination;Impaired sensation;Pain   Assessment Pt seen for PT treatment session this date, consisting of ther ex focused on strengthening and gt training on level surfaces to improve pt safety in household environment. Since previous session, pt has made good progress in terms of increased level surface gait trial with use of RW, initiation of LB exercises with vc for technique. Current goals and POC established on IE remain appropriate, pt continues to have rehab potential and is making good progress towards STGs. Pt prognosis for achieving goals is good, pending pt progress with hospitalization/medical status improvements, and indicated by Stimulability and ability to follow directions. Pt continues to be functioning below baseline level, and remains limited 2* factors listed above. PT will continue to see pt during current hospitalization in order to address the deficits listed above and provide interventions consistent w/ POC in effort to achieve STGs. PT recommends level 2, moderate resource intensity upon discharge.   Barriers to Discharge Inaccessible home environment;Decreased caregiver support   Goals   Patient Goals to go home   STG Expiration Date 01/17/25   PT Treatment Day 1   Plan   Treatment/Interventions Functional transfer training;LE strengthening/ROM;Elevations;Therapeutic exercise;Endurance training;Patient/family training;Equipment eval/education;Bed mobility;Gait training;Spoke to nursing;OT   Progress Progressing toward goals   PT Frequency 3-5x/wk   Discharge Recommendation   Rehab Resource Intensity Level, PT II (Moderate Resource Intensity)   AM-PAC Basic Mobility Inpatient   Turning in Flat Bed Without Bedrails 3   Lying on Back to Sitting on Edge of Flat Bed Without Bedrails 3   Moving Bed to Chair 3   Standing Up From Chair Using Arms 3    Walk in Room 3   Climb 3-5 Stairs With Railing 2   Basic Mobility Inpatient Raw Score 17   Basic Mobility Standardized Score 39.67   [facility] Highest Level Of Mobility   -HL Goal 5: Stand one or more mins   -HL Achieved 7: Walk 25 feet or more   Education   Education Provided Mobility training;Assistive device;Home exercise program   Patient Demonstrates acceptance/verbal understanding;Reinforcement needed   End of Consult   Patient Position at End of Consult Bedside chair;Bed/Chair alarm activated;All needs within reach       Carmita Hernandez, PT, DPT

## 2025-01-08 NOTE — CASE MANAGEMENT
Case Management Discharge Planning Note    Patient name Francesca Retana  Location 2 EAST 253/2 E 253-01 MRN [id number]  : 1945 Date 2025       Current Admission Date: 2025  Current Admission Diagnosis:Acute respiratory failure with hypoxia (HCC)   Patient Active Problem List    Diagnosis Date Noted Date Diagnosed    Acute respiratory failure with hypoxia (HCC) 2025     History of pulmonary embolism 2025     Lesion of left native kidney 2025     Sepsis without acute organ dysfunction (HCC) 10/16/2024     Bacteremia due to Proteus species 10/16/2024     Neurogenic bladder 10/16/2024     Left renal mass 10/15/2024     Multiple sclerosis (HCC) 10/15/2024     Trigeminal neuralgia 10/15/2024     History of lung cancer 10/15/2024       LOS (days): 3  Geometric Mean LOS (GMLOS) (days): 4.9  Days to GMLOS:2     OBJECTIVE:  Risk of Unplanned Readmission Score: 8.95      Current admission status: Inpatient   Preferred Pharmacy:   [facility]/pharm[facility] #[id number] - [place] - [address]  [address]  [facility] [address]  Phone: [phone] Fax: [phone]    Primary Care Provider: Jazz Tripp MD    Primary Insurance: [facility] [employer] [facility]  Secondary Insurance:     DISCHARGE DETAILS:    Discharge planning discussed with:: Patient at bedside.  Freedom of Choice: Yes  Comments - Freedom of Choice: FOC maintained - CM reviewed DCP.  Level II rec reviewed, patient declining STR placement.  Preference is d/c home w/ HHC.  Agreeable to [facility] .  CM reviewed possible d/c with new start home O2.  Advised patient that CM will coordinate all needs for d/c.  CM contacted family/caregiver?: No- see comments (independent)  Were Treatment Team discharge recommendations reviewed with patient/caregiver?: Yes  Did patient/caregiver verbalize understanding of patient care needs?: Yes  Were patient/caregiver advised of the risks associated with not following Treatment Team  discharge recommendations?: Yes    Requested Home Health Care         Is the patient interested in HHC at discharge?: Yes  Home Health Agency Name:: [facility]  HHA External Referral Reason (only applicable if external HHA name selected): Patient has established relationship with provider  Homebound Criteria Met:: Uses an Assist Device (i.e. cane, walker, etc), Requires the Assistance of Another Person for Safe Ambulation or to Leave the Home    DME Referral Provided  Referral made for DME?:  (Home O2 to follow - pending RT eval.)    Other Referral/Resources/Interventions Provided:  Interventions: HHC  Referral Comments: [facility] reserved for HHC.  AVS updated.  STR referral updated, will remain open through to d/c for secondary planning.    Treatment Team Recommendation: Short Term Rehab, Home with Home Health Care  Discharge Destination Plan:: Home with Home Health Care  Transport at Discharge : Family    IMM Given (Date):: 01/08/25  IMM Given to:: Patient (Verbal review of IMM at bedside with patient.  Understanding verbalized, no questions or concerns.  Original to medical records.)    @ 1221 - per SLIM & RT, no home O2 needs.  HHC updated via [facility].

## 2025-01-08 NOTE — PROGRESS NOTES
Patient:  MAGI JEFFERS    MRN:  [id number]    [place] Request ID:  [id number]    Level of care reserved:    Partner Reserved:    Clinical needs requested:    Geography searched:  35 miles [place] [phone]    Start of Service:    Request sent:  3:23pm EST on 1/7/2025 by Shilpa Brannon    Partner reserved:    Choice list shared:

## 2025-01-08 NOTE — DISCHARGE SUMMARY
Discharge Summary - Hospitalist   Name: Francesca Retana 79 y.o. female I MRN: [id number]  Unit/Bed#: 2 E 253-01 I Date of Admission: 1/4/2025   Date of Service: 1/8/2025 I Hospital Day: 3     Assessment & Plan  Acute respiratory failure with hypoxia (HCC)  Presents to the ER 1//25 with cough/X 3 to 4 days  X-ray with no acute abnormalities, CBC and CMP unremarkable  Reportedly wheezing on exam, s/p methylprednisolone 125 mg in the ED plus nebs with some improvement but requiring 2 to 3 L nasal cannula oxygen  Started on prednisone 40 mg p.o. and then discharged on tapering dose considering patient still has wheezing  Home oxygen evaluation done by respiratory therapy, patient does not have any home oxygen needs  Tested positive influenza B  Continue Tamiflu,renally adjusted x 5 days [id number]  Continue Supportive measures including Tessalon Perles, Nebs, PRN Acetaminophen , PRN NC Oxygen ordered   Cont to Wean oxygen as tolerated  Started Hycodan as needed for cough.  Elevated dimer- pending results for CTA PE    Sepsis without acute organ dysfunction (HCC)  Likely Viral sepsis  Patient with tachycardia and tachypnea.  Tested positive for influenza type B.  Started on Tamiflu.  Blood culture negative 48 hours  Lactic acid and procalcitonin within normal range.  Will continue treatment for influenza and supportive care  History of lung cancer  History of non-small cell lung cancer s/p right pneumonectomy November 2016, stage IIIa T4 N1 M0, with positive margins s/p adjuvant radiation with carboplatin and paclitaxel completed February 2017    Per chart review visit September 2024 with medical oncology patient reported weight loss 17 pounds and CT scan September 4, 2024 showed new 8 mm pleural-based nodule left lower lobe and a noted plan was for repeat imaging and follow-up ~  January 2025. Patient is aware and she has the CT scheduled next week as outpatient.     History of pulmonary embolism  History of large  "left PE s/p Eliquis  Followup on CTA PE  Lesion of left native kidney  CT abdomen 10/8/2024 remarkable for 1.7 cm enhancing mass left posterior midpole concerning for renal cell carcinoma was recs for urology evaluation as per radiological report.      Patient is aware and noted that she saw urology already and has plan to repeat CT scan next week and follow up with urology and medical oncology after. Patient is established with medical oncology at [facility] last seen by Mildred Noyola PA-C 9/25/2024.   Influenza B  Continue Tamiflu  Acute viral bronchitis  Continue Tamiflu  Prednisone taper  Beta agonist nebulizers/inhalers at home     Medical Problems       Resolved Problems  Date Reviewed: 10/21/2024   None       Discharging Physician / Practitioner: Felice Moore MD  PCP: Jazz Tripp MD  Admission Date:   Admission Orders (From admission, onward)       Ordered        01/05/25 1319  INPATIENT ADMISSION  Once            01/04/25 1937  Place in Observation  Once                          Discharge Date: 01/08/25    Consultations During Hospital Stay:  None    Procedures Performed:   none    Significant Findings / Test Results:   As above    Incidental Findings:   none     Test Results Pending at Discharge (will require follow up):   none     Outpatient Tests Requested:  none    Complications:  none    Reason for Admission: \"Francesca Retana is a 79 y.o. female with a PMH of NSCLC s/p right pneumonectomy, hx of PE (provoked post op) on Xarelto. who presents with cough x 3-4 days and was found hypoxic in the ED requiring NC Oxygen (2-3 L) and tested + For Flu B.     Seen in the ED , she confirmed above, and noted that prior to this week she was doing fine with no resp symptoms . Above A&P , impression / findings are explained in details , and she agrees.      Importance of obtaining the planned CT CAP as was planned by oncology is explained , and she is aware and has it scheduled with follow up after " "with both oncology and urology, as per patient.      Home meds reviewed, full code confirmed , denies any other questions. Declined need to call and update family. \"    Hospital Course:   Francesca Retana is a 79 y.o. female patient who originally presented to the hospital on 1/4/2025 due to acute hypoxic respiratory failure likely secondary to viral bronchitis from influenza A.  Patient also was diagnosed with viral sepsis.  Blood cultures are negative.  Patient was treated with inhaled beta agonist.  She required about 2 to 3 L of supplemental oxygen via nasal cannula which gradually she was able to be successfully weaned off.  Patient was given Tamiflu.  She continues to improve with her respiratory status.  She does have a history of lung cancer and follows outpatient with oncology.  Does have a history of pulmonary embolism and was continued on Eliquis.  Started on p.o. prednisone today considering patient has wheezing.  With the improvement that we have seen with the patient, patient is medically cleared for discharge as she is ambulating without the need for supplemental oxygen.    Please see above list of diagnoses and related plan for additional information.     Condition at Discharge: stable    Discharge Day Visit / Exam:   Subjective:  \" I am feeling much better today, ready to go home\"  Vitals: Blood Pressure: 146/85 (01/08/25 0726)  Pulse: 96 (01/08/25 0726)  Temperature: 97.8 °F (36.6 °C) (01/08/25 0726)  Temp Source: Oral (01/07/25 1506)  Respirations: 17 (01/08/25 0726)  Height: 5' 5\" (165.1 cm) (01/04/25 2311)  Weight - Scale: 59 kg (130 lb) (01/04/25 2311)  SpO2: 94 % (01/08/25 0755)  Physical Exam General- Awake, alert and oriented x 3, looks comfortable  HEENT- Normocephalic, atraumatic, oral mucosa- moist  Neck- Supple, No carotid bruit, no JVD  CVS- Normal S1/ S2, Regular rate and rhythm, No murmur, No edema  Respiratory system- B/L wheezing noted insp and expiratory, improving  Abdomen- Soft, " Non distended, no tenderness, Bowel sound- present 4 quads  Genitourinary- No suprapubic tenderness, No CVA tenderness  Skin- No new bruise or rash  Musculoskeletal- No gross deformity  Psych- No acute psychosis  CNS- CN II- XII grossly intact, No acute focal neurologic deficit noted      Discussion with Family: Attempted to update  (daughter) via phone. Left voicemail.     Discharge instructions/Information to patient and family:   See after visit summary for information provided to patient and family.      Provisions for Follow-Up Care:  See after visit summary for information related to follow-up care and any pertinent home health orders.      Mobility at time of Discharge:   Basic Mobility Inpatient Raw Score: 17  JH-HLM Goal: 5: Stand one or more mins  JH-HLM Achieved: 7: Walk 25 feet or more  HLM Goal achieved. Continue to encourage appropriate mobility.     Disposition:   Home with VNA Services (Reminder: Complete face to face encounter)    Planned Readmission: no    Discharge Medications:  See after visit summary for reconciled discharge medications provided to patient and/or family.      Administrative Statements   Discharge Statement:  I have spent a total time of 76 minutes in caring for this patient on the day of the visit/encounter. >30 minutes of time was spent on: Diagnostic results, Prognosis, Risks and benefits of tx options, Instructions for management, Patient and family education, Importance of tx compliance, Risk factor reductions, Impressions, Counseling / Coordination of care, Documenting in the medical record, Reviewing / ordering tests, medicine, procedures  , and Communicating with other healthcare professionals .    **Please Note: This note may have been constructed using a voice recognition system**

## 2025-01-08 NOTE — ASSESSMENT & PLAN NOTE
Presents to the ER 1//25 with cough/X 3 to 4 days  X-ray with no acute abnormalities, CBC and CMP unremarkable  Reportedly wheezing on exam, s/p methylprednisolone 125 mg in the ED plus nebs with some improvement but requiring 2 to 3 L nasal cannula oxygen  Started on prednisone 40 mg p.o. and then discharged on tapering dose considering patient still has wheezing  Home oxygen evaluation done by respiratory therapy, patient does not have any home oxygen needs  Tested positive influenza B  Continue Tamiflu,renally adjusted x 5 days [id number]  Continue Supportive measures including Tessalon Perles, Nebs, PRN Acetaminophen , PRN NC Oxygen ordered   Cont to Wean oxygen as tolerated  Started Hycodan as needed for cough.  Elevated dimer- pending results for CTA PE

## 2025-01-09 ENCOUNTER — HOME CARE VISIT (OUTPATIENT)
Dept: HOME HEALTH SERVICES | Facility: HOME HEALTHCARE | Age: 80
End: 2025-01-09
Payer: COMMERCIAL

## 2025-01-09 VITALS
RESPIRATION RATE: 22 BRPM | HEART RATE: 100 BPM | TEMPERATURE: 98.4 F | SYSTOLIC BLOOD PRESSURE: 148 MMHG | OXYGEN SATURATION: 89 % | DIASTOLIC BLOOD PRESSURE: 80 MMHG

## 2025-01-09 PROBLEM — J44.1 ACUTE EXACERBATION OF COPD WITH ASTHMA (HCC): Status: ACTIVE | Noted: 2025-01-09

## 2025-01-09 PROCEDURE — 400013 VN SOC

## 2025-01-09 PROCEDURE — G0299 HHS/HOSPICE OF RN EA 15 MIN: HCPCS

## 2025-01-09 NOTE — UTILIZATION REVIEW
NOTIFICATION OF ADMISSION DISCHARGE   This is a Notification of Discharge from [facility]. Please be advised that this patient has been discharge from our facility. Below you will find the admission and discharge date and time including the patient’s disposition.   UTILIZATION REVIEW CONTACT:  Marie Moise  Utilization   Network Utilization Review Department  Phone: [phone] x carefully listen to the prompts. All voicemails are confidential.  Email: [email or web address]     ADMISSION INFORMATION  PRESENTATION DATE: 1/4/2025  5:40 PM  OBERVATION ADMISSION DATE: 01/04/2025 1937  INPATIENT ADMISSION DATE: 1/5/25  1:19 PM   DISCHARGE DATE: 1/8/2025  2:20 PM   DISPOSITION:Home with Home Health Care    Network Utilization Review Department  ATTENTION: Please call with any questions or concerns to [phone] and carefully listen to the prompts so that you are directed to the right person. All voicemails are confidential.   For Discharge needs, contact Care Management DC Support Team at [phone] opt. 2  Send all requests for admission clinical reviews, approved or denied determinations and any other requests to dedicated fax number below belonging to the campus where the patient is receiving treatment. List of dedicated fax numbers for the Facilities:  FACILITY NAME UR FAX NUMBER   ADMISSION DENIALS (Administrative/Medical Necessity) [phone]   DISCHARGE SUPPORT TEAM ([facility]) [phone]   PARENT CHILD HEALTH (Maternity/NICU/Pediatrics) [phone]   [facility] [phone]   [facility] [phone]   [facility] [phone]   [facility] [phone]   [facility] [phone]   [facility] [phone]   [facility] [phone]   [facility] [address] [facility]  [facility] [phone]   [facility] [phone]   [facility] [phone]   [facility] [phone]   [facility] [phone]

## 2025-01-10 LAB
BACTERIA BLD CULT: NORMAL
BACTERIA BLD CULT: NORMAL

## 2025-01-13 ENCOUNTER — HOME CARE VISIT (OUTPATIENT)
Dept: HOME HEALTH SERVICES | Facility: HOME HEALTHCARE | Age: 80
End: 2025-01-13
Payer: COMMERCIAL

## 2025-01-13 VITALS
TEMPERATURE: 98.4 F | RESPIRATION RATE: 24 BRPM | HEART RATE: 103 BPM | DIASTOLIC BLOOD PRESSURE: 74 MMHG | OXYGEN SATURATION: 92 % | SYSTOLIC BLOOD PRESSURE: 126 MMHG

## 2025-01-13 PROCEDURE — G0299 HHS/HOSPICE OF RN EA 15 MIN: HCPCS

## 2025-01-15 LAB
DME PARACHUTE DELIVERY DATE ACTUAL: NORMAL
DME PARACHUTE DELIVERY DATE REQUESTED: NORMAL
DME PARACHUTE ITEM DESCRIPTION: NORMAL
DME PARACHUTE ORDER STATUS: NORMAL
DME PARACHUTE SUPPLIER NAME: NORMAL
DME PARACHUTE SUPPLIER PHONE: NORMAL

## 2025-01-17 ENCOUNTER — HOME CARE VISIT (OUTPATIENT)
Dept: HOME HEALTH SERVICES | Facility: HOME HEALTHCARE | Age: 80
End: 2025-01-17
Payer: COMMERCIAL

## 2025-01-17 VITALS
DIASTOLIC BLOOD PRESSURE: 68 MMHG | HEART RATE: 100 BPM | SYSTOLIC BLOOD PRESSURE: 138 MMHG | OXYGEN SATURATION: 92 % | RESPIRATION RATE: 20 BRPM | TEMPERATURE: 96 F

## 2025-01-17 PROCEDURE — G0299 HHS/HOSPICE OF RN EA 15 MIN: HCPCS

## 2025-01-20 ENCOUNTER — HOME CARE VISIT (OUTPATIENT)
Dept: HOME HEALTH SERVICES | Facility: HOME HEALTHCARE | Age: 80
End: 2025-01-20
Payer: COMMERCIAL

## 2025-01-20 VITALS
TEMPERATURE: 97.6 F | RESPIRATION RATE: 18 BRPM | HEART RATE: 94 BPM | SYSTOLIC BLOOD PRESSURE: 110 MMHG | DIASTOLIC BLOOD PRESSURE: 70 MMHG | OXYGEN SATURATION: 98 %

## 2025-01-20 PROCEDURE — G0300 HHS/HOSPICE OF LPN EA 15 MIN: HCPCS

## 2025-01-23 ENCOUNTER — HOME CARE VISIT (OUTPATIENT)
Dept: HOME HEALTH SERVICES | Facility: HOME HEALTHCARE | Age: 80
End: 2025-01-23
Payer: COMMERCIAL

## 2025-01-25 ENCOUNTER — OFFICE VISIT (OUTPATIENT)
Dept: URGENT CARE | Facility: CLINIC | Age: 80
End: 2025-01-25
Payer: COMMERCIAL

## 2025-01-25 VITALS
RESPIRATION RATE: 16 BRPM | OXYGEN SATURATION: 96 % | SYSTOLIC BLOOD PRESSURE: 132 MMHG | WEIGHT: 122.38 LBS | BODY MASS INDEX: 20.36 KG/M2 | DIASTOLIC BLOOD PRESSURE: 80 MMHG | HEART RATE: 92 BPM | TEMPERATURE: 98.1 F

## 2025-01-25 DIAGNOSIS — N30.00 ACUTE CYSTITIS WITHOUT HEMATURIA: Primary | ICD-10-CM

## 2025-01-25 LAB
SL AMB  POCT GLUCOSE, UA: ABNORMAL
SL AMB LEUKOCYTE ESTERASE,UA: ABNORMAL
SL AMB POCT BILIRUBIN,UA: ABNORMAL
SL AMB POCT BLOOD,UA: ABNORMAL
SL AMB POCT CLARITY,UA: ABNORMAL
SL AMB POCT COLOR,UA: ABNORMAL
SL AMB POCT KETONES,UA: 40
SL AMB POCT NITRITE,UA: ABNORMAL
SL AMB POCT PH,UA: 6
SL AMB POCT SPECIFIC GRAVITY,UA: 1.02
SL AMB POCT URINE PROTEIN: 100
SL AMB POCT UROBILINOGEN: 0.2

## 2025-01-25 PROCEDURE — 99213 OFFICE O/P EST LOW 20 MIN: CPT

## 2025-01-25 PROCEDURE — 81002 URINALYSIS NONAUTO W/O SCOPE: CPT

## 2025-01-25 PROCEDURE — 87086 URINE CULTURE/COLONY COUNT: CPT

## 2025-01-25 PROCEDURE — 87077 CULTURE AEROBIC IDENTIFY: CPT

## 2025-01-25 RX ORDER — CEPHALEXIN 500 MG/1
500 CAPSULE ORAL EVERY 12 HOURS SCHEDULED
Qty: 10 CAPSULE | Refills: 0 | Status: SHIPPED | OUTPATIENT
Start: 2025-01-25 | End: 2025-01-30

## 2025-01-25 NOTE — PATIENT INSTRUCTIONS
Urinary Tract Infection in Women   WHAT YOU NEED TO KNOW:   A urinary tract infection (UTI) is caused by bacteria that get inside your urinary tract. Most bacteria that enter your urinary tract come out when you urinate. If the bacteria stay in your urinary tract, you may get an infection. Your urinary tract includes your kidneys, ureters, bladder, and urethra. Urine is made in your kidneys, and it flows from the ureters to the bladder. Urine leaves the bladder through the urethra. A UTI is more common in your lower urinary tract, which includes your bladder and urethra.      DISCHARGE INSTRUCTIONS:   Return to the emergency department if:   You are urinating very little or not at all.     You have a high fever with shaking chills.      You have side or back pain that gets worse.     Call your doctor if:   You have a fever.     You do not feel better after 2 days of taking antibiotics.     You are vomiting.      You have questions or concerns about your condition or care.     Medicines:   Antibiotics  help fight a bacterial infection. If you have UTIs often (called recurrent UTIs), you may be given antibiotics to take regularly. You will be given directions for when and how to use antibiotics. The goal is to prevent UTIs but not cause antibiotic resistance by using antibiotics too often.     Medicines  may be given to decrease pain and burning when you urinate. They will also help decrease the feeling that you need to urinate often. These medicines will make your urine orange or red.     Take your medicine as directed.  Contact your healthcare provider if you think your medicine is not helping or if you have side effects. Tell him or her if you are allergic to any medicine. Keep a list of the medicines, vitamins, and herbs you take. Include the amounts, and when and why you take them. Bring the list or the pill bottles to follow-up visits. Carry your medicine list with you in case of an emergency.     Follow up with  your healthcare provider as directed:  Write down your questions so you remember to ask them during your visits.   Prevent another UTI:   Empty your bladder often.  Urinate and empty your bladder as soon as you feel the need. Do not hold your urine for long periods of time.     Wipe from front to back after you urinate or have a bowel movement.  This will help prevent germs from getting into your urinary tract through your urethra.     Drink liquids as directed.  Ask how much liquid to drink each day and which liquids are best for you. You may need to drink more liquids than usual to help flush out the bacteria. Do not drink alcohol, caffeine, or citrus juices. These can irritate your bladder and increase your symptoms. Your healthcare provider may recommend cranberry juice to help prevent a UTI.     Urinate after you have sex.  This can help flush out bacteria passed during sex.     Do not douche or use feminine deodorants.  These can change the chemical balance in your vagina.     Change sanitary pads or tampons often.  This will help prevent germs from getting into your urinary tract.      Talk to your healthcare provider about your birth control method.  You may need to change your method if it is increasing your risk for UTIs.     Wear cotton underwear and clothes that are loose.  Tight pants and nylon underwear can trap moisture and cause bacteria to grow.     Vaginal estrogen may be recommended.  This medicine helps prevent UTIs in women who have gone through menopause or are in susana-menopause.     Do pelvic muscle exercises often.  Pelvic muscle exercises may help you start and stop urinating. Strong pelvic muscles may help you empty your bladder easier. Squeeze these muscles tightly for 5 seconds like you are trying to hold back urine. Then relax for 5 seconds. Gradually work up to squeezing for 10 seconds. Do 3 sets of 15 repetitions a day, or as directed.     © Copyright [employer] 2021 Information  is for End User's use only and may not be sold, redistributed or otherwise used for commercial purposes. All illustrations and images included in CareNotes® are the copyrighted property of [employer] or [employer]  The above information is an  only. It is not intended as medical advice for individual conditions or treatments. Talk to your doctor, nurse or pharmacist before following any medical regimen to see if it is safe and effective for you.

## 2025-01-25 NOTE — PROGRESS NOTES
[facility]        NAME: Francesca Retana is a 79 y.o. female  : 1945    MRN: [id number]  DATE: 2025  TIME: 12:14 PM    Assessment and Plan   Acute cystitis without hematuria [N30.00]  1. Acute cystitis without hematuria  POCT urine dip    Urine culture    Urine culture    cephalexin (KEFLEX) 500 mg capsule        Urine dip with large leukocytes. Will treat and send for culture.     Patient Instructions     Urinary Tract Infection in Women   WHAT YOU NEED TO KNOW:   A urinary tract infection (UTI) is caused by bacteria that get inside your urinary tract. Most bacteria that enter your urinary tract come out when you urinate. If the bacteria stay in your urinary tract, you may get an infection. Your urinary tract includes your kidneys, ureters, bladder, and urethra. Urine is made in your kidneys, and it flows from the ureters to the bladder. Urine leaves the bladder through the urethra. A UTI is more common in your lower urinary tract, which includes your bladder and urethra.      DISCHARGE INSTRUCTIONS:   Return to the emergency department if:   You are urinating very little or not at all.     You have a high fever with shaking chills.      You have side or back pain that gets worse.     Call your doctor if:   You have a fever.     You do not feel better after 2 days of taking antibiotics.     You are vomiting.      You have questions or concerns about your condition or care.     Medicines:   Antibiotics  help fight a bacterial infection. If you have UTIs often (called recurrent UTIs), you may be given antibiotics to take regularly. You will be given directions for when and how to use antibiotics. The goal is to prevent UTIs but not cause antibiotic resistance by using antibiotics too often.     Medicines  may be given to decrease pain and burning when you urinate. They will also help decrease the feeling that you need to urinate often. These medicines will make your urine orange or  red.     Take your medicine as directed.  Contact your healthcare provider if you think your medicine is not helping or if you have side effects. Tell him or her if you are allergic to any medicine. Keep a list of the medicines, vitamins, and herbs you take. Include the amounts, and when and why you take them. Bring the list or the pill bottles to follow-up visits. Carry your medicine list with you in case of an emergency.     Follow up with your healthcare provider as directed:  Write down your questions so you remember to ask them during your visits.   Prevent another UTI:   Empty your bladder often.  Urinate and empty your bladder as soon as you feel the need. Do not hold your urine for long periods of time.     Wipe from front to back after you urinate or have a bowel movement.  This will help prevent germs from getting into your urinary tract through your urethra.     Drink liquids as directed.  Ask how much liquid to drink each day and which liquids are best for you. You may need to drink more liquids than usual to help flush out the bacteria. Do not drink alcohol, caffeine, or citrus juices. These can irritate your bladder and increase your symptoms. Your healthcare provider may recommend cranberry juice to help prevent a UTI.     Urinate after you have sex.  This can help flush out bacteria passed during sex.     Do not douche or use feminine deodorants.  These can change the chemical balance in your vagina.     Change sanitary pads or tampons often.  This will help prevent germs from getting into your urinary tract.      Talk to your healthcare provider about your birth control method.  You may need to change your method if it is increasing your risk for UTIs.     Wear cotton underwear and clothes that are loose.  Tight pants and nylon underwear can trap moisture and cause bacteria to grow.     Vaginal estrogen may be recommended.  This medicine helps prevent UTIs in women who have gone through menopause or  are in susana-menopause.     Do pelvic muscle exercises often.  Pelvic muscle exercises may help you start and stop urinating. Strong pelvic muscles may help you empty your bladder easier. Squeeze these muscles tightly for 5 seconds like you are trying to hold back urine. Then relax for 5 seconds. Gradually work up to squeezing for 10 seconds. Do 3 sets of 15 repetitions a day, or as directed.     © Copyright [employer] 2021 Information is for End User's use only and may not be sold, redistributed or otherwise used for commercial purposes. All illustrations and images included in CareNotes® are the copyrighted property of [employer]. or [employer]  The above information is an  only. It is not intended as medical advice for individual conditions or treatments. Talk to your doctor, nurse or pharmacist before following any medical regimen to see if it is safe and effective for you.    Follow up with PCP in 3-5 days.  Proceed to  ER if symptoms worsen.    If tests are performed, our office will contact you with results only if changes need to made to the care plan discussed with you at the visit. You can review your full results on [facility].    Chief Complaint     Chief Complaint   Patient presents with    Possible UTI     Sx for 2 days. Dysuria, very foul odor, frequency and urgency. Lower abd pain. No fever or chills.          History of Present Illness       Has history of left kidney mass that is currently being followed by urology.     Urinary Tract Infection   This is a new problem. The current episode started in the past 7 days. The problem occurs every urination. The problem has been unchanged. The quality of the pain is described as burning. The pain is mild. There has been no fever. There is No history of pyelonephritis. Associated symptoms include frequency and urgency. Pertinent negatives include no chills, discharge, flank pain, hematuria, nausea or vomiting. She has  tried increased fluids for the symptoms. The treatment provided mild relief.       Review of Systems   Review of Systems   Constitutional:  Negative for chills and fever.   HENT:  Negative for congestion, postnasal drip, rhinorrhea, sinus pressure, sore throat and trouble swallowing.    Respiratory:  Negative for cough, chest tightness and shortness of breath.    Cardiovascular:  Negative for chest pain and palpitations.   Gastrointestinal:  Negative for abdominal pain, nausea and vomiting.   Genitourinary:  Positive for dysuria, frequency, pelvic pain and urgency. Negative for difficulty urinating, flank pain and hematuria.        Malodorous urine    Musculoskeletal:  Negative for myalgias.   Neurological:  Negative for dizziness and headaches.         Current Medications       Current Outpatient Medications:     albuterol (PROVENTIL HFA,VENTOLIN HFA) 90 mcg/act inhaler, Inhale 2 puffs every 6 (six) hours as needed for shortness of breath or wheezing, Disp: , Rfl:     aspirin (ECOTRIN LOW STRENGTH) 81 mg EC tablet, Take 81 mg by mouth daily, Disp: , Rfl:     carBAMazepine (TEGretol XR) 200 mg 12 hr tablet, Take 200 mg by mouth 2 (two) times a day, Disp: , Rfl:     cephalexin (KEFLEX) 500 mg capsule, Take 1 capsule (500 mg total) by mouth every 12 (twelve) hours for 5 days, Disp: 10 capsule, Rfl: 0    Cholecalciferol (VITAMIN D3) 1,000 units tablet, Take 1,000 Units by mouth daily, Disp: , Rfl:     cyanocobalamin (VITAMIN B-12) 100 mcg tablet, Take 100 mcg by mouth daily, Disp: , Rfl:     triamcinolone (KENALOG) 0.1 % ointment, PLEASE SEE ATTACHED FOR DETAILED DIRECTIONS, Disp: , Rfl:     estradiol (ESTRACE VAGINAL) 0.1 mg/g vaginal cream, Apply pea sized amount around urethra and inside vaginal opening 3 times per week (Patient not taking: Reported on 1/4/2025), Disp: 42.5 g, Rfl: 2    ipratropium (Atrovent HFA) 17 mcg/act inhaler, Inhale 2 puffs 2 (two) times a day (Patient not taking: Reported on 1/9/2025), Disp:  12.9 g, Rfl: 0    Current Allergies     Allergies as of 01/25/2025 - Reviewed 01/25/2025   Allergen Reaction Noted    Sulfa antibiotics Other (See Comments) 01/04/2025            The following portions of the patient's history were reviewed and updated as appropriate: allergies, current medications, past family history, past medical history, past social history, past surgical history and problem list.     Past Medical History:   Diagnosis Date    Cancer (HCC)     only has the left lung     MS (multiple sclerosis) (HCC)     Neurogenic bladder     Trigeminal neuralgia        Past Surgical History:   Procedure Laterality Date    LUNG REMOVAL, TOTAL      right        Family History   Problem Relation Age of Onset    Cirrhosis Father     No Known Problems Mother     No Known Problems Daughter     No Known Problems Daughter          Medications have been verified.        Objective   /80   Pulse 92   Temp 98.1 °F (36.7 °C)   Resp 16   Wt 55.5 kg (122 lb 6 oz)   SpO2 96%   BMI 20.36 kg/m²        Physical Exam     Physical Exam  Constitutional:       General: She is not in acute distress.  HENT:      Head: Normocephalic.      Nose: Nose normal.   Eyes:      Pupils: Pupils are equal, round, and reactive to light.   Cardiovascular:      Rate and Rhythm: Normal rate and regular rhythm.      Pulses: Normal pulses.      Heart sounds: Normal heart sounds.   Pulmonary:      Effort: Pulmonary effort is normal.      Breath sounds: Normal breath sounds.   Abdominal:      General: Abdomen is flat. There is no distension.      Palpations: Abdomen is soft.      Tenderness: There is no abdominal tenderness. There is no right CVA tenderness, left CVA tenderness or guarding.   Musculoskeletal:         General: Normal range of motion.   Skin:     General: Skin is warm and dry.      Capillary Refill: Capillary refill takes less than 2 seconds.   Neurological:      Mental Status: She is alert and oriented to person, place, and  time.

## 2025-01-27 ENCOUNTER — RESULTS FOLLOW-UP (OUTPATIENT)
Dept: URGENT CARE | Facility: CLINIC | Age: 80
End: 2025-01-27

## 2025-01-27 LAB
BACTERIA UR CULT: ABNORMAL
BACTERIA UR CULT: ABNORMAL

## 2025-02-07 PROBLEM — J20.8 ACUTE VIRAL BRONCHITIS: Status: RESOLVED | Noted: 2025-01-08 | Resolved: 2025-02-07

## 2025-02-07 PROBLEM — J10.1 INFLUENZA B: Status: RESOLVED | Noted: 2025-01-08 | Resolved: 2025-02-07

## 2025-06-13 ENCOUNTER — HOSPITAL ENCOUNTER (INPATIENT)
Facility: HOSPITAL | Age: 80
LOS: 2 days | Discharge: HOME | End: 2025-06-16
Attending: EMERGENCY MEDICINE | Admitting: STUDENT IN AN ORGANIZED HEALTH CARE EDUCATION/TRAINING PROGRAM
Payer: COMMERCIAL

## 2025-06-13 ENCOUNTER — APPOINTMENT (EMERGENCY)
Dept: CT IMAGING | Facility: HOSPITAL | Age: 80
End: 2025-06-13
Payer: COMMERCIAL

## 2025-06-13 ENCOUNTER — APPOINTMENT (EMERGENCY)
Dept: RADIOLOGY | Facility: HOSPITAL | Age: 80
End: 2025-06-13
Payer: COMMERCIAL

## 2025-06-13 DIAGNOSIS — R33.9 URINARY RETENTION: Primary | ICD-10-CM

## 2025-06-13 DIAGNOSIS — K85.90 PANCREATITIS: ICD-10-CM

## 2025-06-13 DIAGNOSIS — R10.9 ABDOMINAL PAIN: ICD-10-CM

## 2025-06-13 DIAGNOSIS — N39.0 UTI (URINARY TRACT INFECTION): ICD-10-CM

## 2025-06-13 DIAGNOSIS — N28.89 LEFT RENAL MASS: ICD-10-CM

## 2025-06-13 PROBLEM — N30.90 CYSTITIS: Status: ACTIVE | Noted: 2025-06-13

## 2025-06-13 PROBLEM — R79.89 ELEVATED TROPONIN: Status: ACTIVE | Noted: 2025-06-13

## 2025-06-13 LAB
2HR DELTA HS TROPONIN: 92 NG/L
4HR DELTA HS TROPONIN: 156 NG/L
ALBUMIN SERPL BCG-MCNC: 3.9 G/DL (ref 3.5–5)
ALP SERPL-CCNC: 99 U/L (ref 34–104)
ALT SERPL W P-5'-P-CCNC: 8 U/L (ref 7–52)
AMORPH URATE CRY URNS QL MICRO: ABNORMAL
ANION GAP SERPL CALCULATED.3IONS-SCNC: 8 MMOL/L (ref 4–13)
AST SERPL W P-5'-P-CCNC: 13 U/L (ref 13–39)
ATRIAL RATE: 91 BPM
BACTERIA UR QL AUTO: ABNORMAL /HPF
BASOPHILS # BLD AUTO: 0.05 THOUSANDS/ÂΜL (ref 0–0.1)
BASOPHILS NFR BLD AUTO: 1 % (ref 0–1)
BILIRUB SERPL-MCNC: 0.38 MG/DL (ref 0.2–1)
BILIRUB UR QL STRIP: NEGATIVE
BUN SERPL-MCNC: 17 MG/DL (ref 5–25)
CALCIUM SERPL-MCNC: 9.2 MG/DL (ref 8.4–10.2)
CARDIAC TROPONIN I PNL SERPL HS: 116 NG/L (ref ?–50)
CARDIAC TROPONIN I PNL SERPL HS: 180 NG/L (ref ?–50)
CARDIAC TROPONIN I PNL SERPL HS: 24 NG/L (ref ?–50)
CHLORIDE SERPL-SCNC: 105 MMOL/L (ref 96–108)
CLARITY UR: ABNORMAL
CO2 SERPL-SCNC: 29 MMOL/L (ref 21–32)
COLOR UR: ABNORMAL
CREAT SERPL-MCNC: 0.91 MG/DL (ref 0.6–1.3)
EOSINOPHIL # BLD AUTO: 0.26 THOUSAND/ÂΜL (ref 0–0.61)
EOSINOPHIL NFR BLD AUTO: 5 % (ref 0–6)
ERYTHROCYTE [DISTWIDTH] IN BLOOD BY AUTOMATED COUNT: 14.7 % (ref 11.6–15.1)
GFR SERPL CREATININE-BSD FRML MDRD: 59 ML/MIN/1.73SQ M
GLUCOSE SERPL-MCNC: 103 MG/DL (ref 65–140)
GLUCOSE UR STRIP-MCNC: NEGATIVE MG/DL
HCT VFR BLD AUTO: 37.6 % (ref 34.8–46.1)
HGB BLD-MCNC: 12.1 G/DL (ref 11.5–15.4)
HGB UR QL STRIP.AUTO: ABNORMAL
IMM GRANULOCYTES # BLD AUTO: 0.01 THOUSAND/UL (ref 0–0.2)
IMM GRANULOCYTES NFR BLD AUTO: 0 % (ref 0–2)
KETONES UR STRIP-MCNC: NEGATIVE MG/DL
LACTATE SERPL-SCNC: 1.7 MMOL/L (ref 0.5–2)
LEUKOCYTE ESTERASE UR QL STRIP: ABNORMAL
LIPASE SERPL-CCNC: 1079 U/L (ref 11–82)
LYMPHOCYTES # BLD AUTO: 2 THOUSANDS/ÂΜL (ref 0.6–4.47)
LYMPHOCYTES NFR BLD AUTO: 36 % (ref 14–44)
MCH RBC QN AUTO: 30.8 PG (ref 26.8–34.3)
MCHC RBC AUTO-ENTMCNC: 32.2 G/DL (ref 31.4–37.4)
MCV RBC AUTO: 96 FL (ref 82–98)
MONOCYTES # BLD AUTO: 0.51 THOUSAND/ÂΜL (ref 0.17–1.22)
MONOCYTES NFR BLD AUTO: 9 % (ref 4–12)
NEUTROPHILS # BLD AUTO: 2.75 THOUSANDS/ÂΜL (ref 1.85–7.62)
NEUTS SEG NFR BLD AUTO: 49 % (ref 43–75)
NITRITE UR QL STRIP: NEGATIVE
NON-SQ EPI CELLS URNS QL MICRO: ABNORMAL /HPF
NRBC BLD AUTO-RTO: 0 /100 WBCS
P AXIS: 64 DEGREES
PH UR STRIP.AUTO: 7 [PH]
PLATELET # BLD AUTO: 233 THOUSANDS/UL (ref 149–390)
PMV BLD AUTO: 10 FL (ref 8.9–12.7)
POTASSIUM SERPL-SCNC: 3.5 MMOL/L (ref 3.5–5.3)
PR INTERVAL: 160 MS
PROT SERPL-MCNC: 7.1 G/DL (ref 6.4–8.4)
PROT UR STRIP-MCNC: ABNORMAL MG/DL
QRS AXIS: 64 DEGREES
QRSD INTERVAL: 86 MS
QT INTERVAL: 368 MS
QTC INTERVAL: 452 MS
RBC # BLD AUTO: 3.93 MILLION/UL (ref 3.81–5.12)
RBC #/AREA URNS AUTO: ABNORMAL /HPF
SODIUM SERPL-SCNC: 142 MMOL/L (ref 135–147)
SP GR UR STRIP.AUTO: 1.01 (ref 1–1.03)
T WAVE AXIS: 87 DEGREES
UROBILINOGEN UR STRIP-ACNC: <2 MG/DL
VENTRICULAR RATE: 91 BPM
WBC # BLD AUTO: 5.58 THOUSAND/UL (ref 4.31–10.16)
WBC #/AREA URNS AUTO: ABNORMAL /HPF

## 2025-06-13 PROCEDURE — 87086 URINE CULTURE/COLONY COUNT: CPT

## 2025-06-13 PROCEDURE — 85025 COMPLETE CBC W/AUTO DIFF WBC: CPT

## 2025-06-13 PROCEDURE — 99285 EMERGENCY DEPT VISIT HI MDM: CPT

## 2025-06-13 PROCEDURE — 99222 1ST HOSP IP/OBS MODERATE 55: CPT | Performed by: PHYSICIAN ASSISTANT

## 2025-06-13 PROCEDURE — 81001 URINALYSIS AUTO W/SCOPE: CPT

## 2025-06-13 PROCEDURE — 71045 X-RAY EXAM CHEST 1 VIEW: CPT

## 2025-06-13 PROCEDURE — 80053 COMPREHEN METABOLIC PANEL: CPT

## 2025-06-13 PROCEDURE — 83690 ASSAY OF LIPASE: CPT

## 2025-06-13 PROCEDURE — 93010 ELECTROCARDIOGRAM REPORT: CPT | Performed by: INTERNAL MEDICINE

## 2025-06-13 PROCEDURE — 83605 ASSAY OF LACTIC ACID: CPT

## 2025-06-13 PROCEDURE — 36415 COLL VENOUS BLD VENIPUNCTURE: CPT

## 2025-06-13 PROCEDURE — 87040 BLOOD CULTURE FOR BACTERIA: CPT

## 2025-06-13 PROCEDURE — 84484 ASSAY OF TROPONIN QUANT: CPT

## 2025-06-13 PROCEDURE — 99222 1ST HOSP IP/OBS MODERATE 55: CPT | Performed by: STUDENT IN AN ORGANIZED HEALTH CARE EDUCATION/TRAINING PROGRAM

## 2025-06-13 PROCEDURE — 93005 ELECTROCARDIOGRAM TRACING: CPT

## 2025-06-13 PROCEDURE — 74177 CT ABD & PELVIS W/CONTRAST: CPT

## 2025-06-13 RX ORDER — ASPIRIN 81 MG/1
81 TABLET ORAL DAILY
Status: DISCONTINUED | OUTPATIENT
Start: 2025-06-13 | End: 2025-06-16 | Stop reason: HOSPADM

## 2025-06-13 RX ORDER — METHENAMINE HIPPURATE 1000 MG/1
1 TABLET ORAL 2 TIMES DAILY
Status: DISCONTINUED | OUTPATIENT
Start: 2025-06-13 | End: 2025-06-14

## 2025-06-13 RX ORDER — ENOXAPARIN SODIUM 100 MG/ML
40 INJECTION SUBCUTANEOUS DAILY
Status: DISCONTINUED | OUTPATIENT
Start: 2025-06-13 | End: 2025-06-16 | Stop reason: HOSPADM

## 2025-06-13 RX ORDER — ACETAMINOPHEN 325 MG/1
650 TABLET ORAL ONCE
Status: COMPLETED | OUTPATIENT
Start: 2025-06-13 | End: 2025-06-13

## 2025-06-13 RX ORDER — ALBUTEROL SULFATE 90 UG/1
2 INHALANT RESPIRATORY (INHALATION) EVERY 6 HOURS PRN
Status: DISCONTINUED | OUTPATIENT
Start: 2025-06-13 | End: 2025-06-16 | Stop reason: HOSPADM

## 2025-06-13 RX ORDER — CARBAMAZEPINE 100 MG/1
200 TABLET, EXTENDED RELEASE ORAL 2 TIMES DAILY
Status: DISCONTINUED | OUTPATIENT
Start: 2025-06-13 | End: 2025-06-16 | Stop reason: HOSPADM

## 2025-06-13 RX ORDER — POLYETHYLENE GLYCOL 3350 17 G/17G
17 POWDER, FOR SOLUTION ORAL DAILY
Status: DISCONTINUED | OUTPATIENT
Start: 2025-06-13 | End: 2025-06-16 | Stop reason: HOSPADM

## 2025-06-13 RX ORDER — SENNOSIDES 8.6 MG
1 TABLET ORAL 2 TIMES DAILY
Status: DISCONTINUED | OUTPATIENT
Start: 2025-06-13 | End: 2025-06-15

## 2025-06-13 RX ORDER — ACETAMINOPHEN 325 MG/1
650 TABLET ORAL EVERY 6 HOURS PRN
Status: DISCONTINUED | OUTPATIENT
Start: 2025-06-13 | End: 2025-06-16 | Stop reason: HOSPADM

## 2025-06-13 RX ORDER — FENTANYL CITRATE 50 UG/ML
50 INJECTION, SOLUTION INTRAMUSCULAR; INTRAVENOUS ONCE
Refills: 0 | Status: COMPLETED | OUTPATIENT
Start: 2025-06-13 | End: 2025-06-13

## 2025-06-13 RX ADMIN — POLYETHYLENE GLYCOL 3350 17 G: 17 POWDER, FOR SOLUTION ORAL at 09:27

## 2025-06-13 RX ADMIN — VITAM B12 100 MCG: 100 TAB at 09:27

## 2025-06-13 RX ADMIN — CARBAMAZEPINE 200 MG: 100 TABLET, EXTENDED RELEASE ORAL at 17:35

## 2025-06-13 RX ADMIN — METHENAMINE HIPPURATE 1 G: 1 TABLET ORAL at 17:35

## 2025-06-13 RX ADMIN — CARBAMAZEPINE 200 MG: 100 TABLET, EXTENDED RELEASE ORAL at 09:28

## 2025-06-13 RX ADMIN — ACETAMINOPHEN 650 MG: 325 TABLET ORAL at 05:54

## 2025-06-13 RX ADMIN — SENNOSIDES 8.6 MG: 8.6 TABLET, FILM COATED ORAL at 09:27

## 2025-06-13 RX ADMIN — IOHEXOL 100 ML: 350 INJECTION, SOLUTION INTRAVENOUS at 06:12

## 2025-06-13 RX ADMIN — CEFTRIAXONE SODIUM 1000 MG: 10 INJECTION, POWDER, FOR SOLUTION INTRAVENOUS at 07:46

## 2025-06-13 RX ADMIN — METHENAMINE HIPPURATE 1 G: 1 TABLET ORAL at 09:27

## 2025-06-13 RX ADMIN — ASPIRIN 81 MG: 81 TABLET, COATED ORAL at 09:27

## 2025-06-13 RX ADMIN — ENOXAPARIN SODIUM 40 MG: 40 INJECTION SUBCUTANEOUS at 09:27

## 2025-06-13 RX ADMIN — SENNOSIDES 8.6 MG: 8.6 TABLET, FILM COATED ORAL at 17:35

## 2025-06-13 RX ADMIN — SODIUM CHLORIDE 1000 ML: 0.9 INJECTION, SOLUTION INTRAVENOUS at 07:48

## 2025-06-13 RX ADMIN — FENTANYL CITRATE 50 MCG: 50 INJECTION INTRAMUSCULAR; INTRAVENOUS at 07:45

## 2025-06-13 RX ADMIN — Medication 1000 UNITS: at 09:27

## 2025-06-13 NOTE — CONSULTS
Consult - Urology   Francesca Retana 1945, 79 y.o. female MRN: [id number]    Unit/Bed#: -01 Encounter: [id number]    Left renal mass  Assessment & Plan  - Known small left renal mass managed on active surveillance.   - CT showing stable size of 1.6 cm enhancing left renal mass   - Outpatient management regarding ongoing active surveillance vs definitive therapies. She [facility]    * Urinary retention  Assessment & Plan  - Presenting with acute onset inability to urinate and abdominal pain  - Failed urinary retention protocol and grant catheter inserted.   - UA micro positive for innumerable RBCs and WBCs. No bacteria. Urine culture and BC in process.   - Lactate normal. No leukocytosis.   - CT showing circumferential bladder wall thickening with perivesicular fat stranding 1.6 cm enhancing lesion in left kidney upper pole.   - Continue antibiosis, adjust based on cultures  - Continue outpatient recurrent UTI prevention  - Maintain grant until completion of antibiotic course and schedule outpatient voiding trial with urologist, currently following with [facility]       Will sign off at this time and follow up outpatient with primary urologist for voiding trial    Subjective:   78 y/o female known to our service for recurrent UTIs, renal mass, and neurogenic bladder presenting with difficulty urinating and abdominal pain. She was hypertensive and tachycardic secondary to pain on arrival. Improvement after straight catheterization. Grant was placed due to persistent urinary retention. UA micro positive for innumerable RBCs and WBCs. No bacteria. Urine culture and BC in process. Lactate normal. No leukocytosis. No AUDI. CT in ED showing circumferential bladder wall thickening with perivesicular fat stranding 1.6 cm enhancing lesion in left kidney upper pole. She is afebrile and hemodynamically stable. She is feeling much better since grant is in place. No episodes of retention in the past.  "    History of neurogenic bladder secondary to MS     History of recurrent UTIs with previous hospitalizations for pyelonephritis     On Estrace, cranberry supplement, probiotics, d-mannose, Hiprex? for UTI prevention     Cystoscopy 2/14/2023: Negative    Review of Systems   Constitutional:  Negative for chills and fever.   Respiratory:  Negative for shortness of breath.    Cardiovascular:  Negative for chest pain.   Gastrointestinal:  Negative for abdominal pain.   Genitourinary:  Positive for difficulty urinating. Negative for dysuria, flank pain, hematuria and pelvic pain.   Neurological:  Negative for dizziness.       Objective:  Vitals: Blood pressure 119/69, pulse 87, temperature 97.5 °F (36.4 °C), resp. rate 18, height 5' 5\" (1.651 m), weight 63.2 kg (139 lb 5.3 oz), SpO2 94%.,Body mass index is 23.19 kg/m².    Physical Exam  Constitutional:       General: She is not in acute distress.     Appearance: Normal appearance. She is not ill-appearing or toxic-appearing.   HENT:      Head: Normocephalic and atraumatic.      Right Ear: External ear normal.      Left Ear: External ear normal.      Nose: Nose normal.     Eyes:      General: No scleral icterus.     Conjunctiva/sclera: Conjunctivae normal.       Cardiovascular:      Pulses: Normal pulses.   Pulmonary:      Effort: Pulmonary effort is normal.   Genitourinary:     Comments: Moser patent for clear slightly blush colored urine    Musculoskeletal:         General: Normal range of motion.      Cervical back: Normal range of motion.     Neurological:      General: No focal deficit present.      Mental Status: She is alert and oriented to person, place, and time.     Psychiatric:         Mood and Affect: Mood normal.         Behavior: Behavior normal.         Thought Content: Thought content normal.         Judgment: Judgment normal.         Imaging:  CT ABDOMEN AND PELVIS WITH IV CONTRAST     INDICATION: urinary retention, abd pain. .     COMPARISON: Renal " stone protocol CT 10/15/2024     TECHNIQUE: CT examination of the abdomen and pelvis was performed. Multiplanar 2D reformatted images were created from the source data.     This examination, like all CT scans performed in the [facility], was performed utilizing techniques to minimize radiation dose exposure, including the use of iterative reconstruction and automated exposure control. Radiation dose length   product (DLP) for this visit: 523 mGy-cm.     IV Contrast: 100 mL of iohexol (OMNIPAQUE)  Enteric Contrast: Not administered.     FINDINGS:     ABDOMEN     LOWER CHEST: Postoperative changes from a right-sided pneumonectomy. Small hiatal hernia.     LIVER/BILIARY TREE: Liver has a normal contour. Unchanged hypodense lesion along the medial aspect of the left hepatic lobe, compatible with a simple hepatic cyst. No perihepatic fluid. Visualized hepatic and portal veins are contrast opacified. No intra   or extrahepatic biliary ductal dilatation.     GALLBLADDER: No evidence of cholelithiasis or definitive findings of cholecystitis     SPLEEN: Unremarkable.     PANCREAS: No peripancreatic fat stranding suggest acute pancreatitis. No focal pancreatic lesion. No evidence of pancreatic ductal dilatation.     ADRENAL GLANDS: Unremarkable.     KIDNEYS/URETERS: There is an approximately 1.6 cm contrast-enhancing lesion along the upper pole the left kidney (series 2, image 33). This was seen on prior abdominal MRI and was worrisome for a renal cell carcinoma. Redemonstrated is an adjacent 2.0 cm   left upper pole renal cyst. No evidence of hydronephrosis or nephrolithiasis.     STOMACH AND BOWEL: No evidence of bowel obstruction. There is extensive diverticulosis without definitive findings to suggest diverticulitis. Moderate to large colonic stool burden.     APPENDIX: No findings to suggest appendicitis.     ABDOMINOPELVIC CAVITY: No ascites. No pneumoperitoneum. No lymphadenopathy.     VESSELS:  Atherosclerotic calcifications of the abdominal and pelvic vasculature.     PELVIS     REPRODUCTIVE ORGANS: Unremarkable for patient's age.     URINARY BLADDER: Circumferential bladder wall thickening with perivesicular fat stranding. Findings are worrisome for cystitis. Redemonstrated is a left laterally projecting bladder diverticulum.     ABDOMINAL WALL/INGUINAL REGIONS: Unremarkable.     BONES: No acute fracture or suspicious osseous lesion. Grade 1 anterolisthesis of L4 on L5. Trace retrolisthesis of L2 on L3 and L3 on L4. Moderate spinal canal narrowing at L4-L5 secondary to combination of degenerative disc bulge and ligamentum flavum   hypertrophy.     IMPRESSION:     1.  Circumferential bladder wall thickening with perivesicular fat stranding, worrisome for cystitis. Correlate with urinalysis.  2.  Redemonstrated 1.6 cm contrast-enhancing lesion along the upper pole the left kidney, which remains worrisome for renal cell carcinoma.     Imaging reviewed - both report and images personally reviewed.     Labs:  Recent Labs     06/13/25  0513   WBC 5.58     Recent Labs     06/13/25  0513   HGB 12.1       Recent Labs     06/13/25  0513   CREATININE 0.91       Microbiology:  Urine culture and Bcx2 in process    History:  Social History[1]  Past Medical History[2]  Past Surgical History[3]  Family History[4]    Sarah Schnitzler, PA-C  Date: 6/13/2025 Time: 8:51 AM              [1]   Social History  Socioeconomic History    Marital status:    Tobacco Use    Smoking status: Former     Passive exposure: Past (as a teenager growing up)    Smokeless tobacco: Never   Vaping Use    Vaping status: Never Used   Substance and Sexual Activity    Alcohol use: Yes     Comment: Occ.    Drug use: Never    Sexual activity: Not Currently     Social Drivers of Health     Financial Resource Strain: Low Risk  (3/12/2024)    Received from [facility]    Overall Financial Resource Strain (CARDIA)     Difficulty  of Paying Living Expenses: Not very hard   Food Insecurity: No Food Insecurity (2025)    Nursing - Inadequate Food Risk Classification     Worried About Running Out of Food in the Last Year: Never true     Ran Out of Food in the Last Year: Never true     Ran Out of Food in the Last Year: Never true   Transportation Needs: No Transportation Needs (2025)    OASIS : Transportation     Lack of Transportation (Medical): No     Lack of Transportation (Non-Medical): No     Patient Unable or Declines to Respond: No   Stress: Patient Declined (3/12/2024)    Received from [facility] [place] [facility] - Occupational Stress Questionnaire     Feeling of Stress : Patient declined   Social Connections: Feeling Socially Integrated (3/12/2024)    Received from [facility]    OASIS : Social Isolation     How often do you feel lonely or isolated from those around you?: Never   Intimate Partner Violence: Unknown (2025)    Nursing IPS     Physically Hurt by Someone: No     Hurt or Threatened by Someone: No   Housing Stability: Unknown (2025)    Nursing: Inadequate Housing Risk Classification     Unable to Pay for Housing in the Last Year: No     Has Housin   [2]   Past Medical History:  Diagnosis Date    Cancer (HCC)     only has the left lung     MS (multiple sclerosis) (HCC)     Neurogenic bladder     Trigeminal neuralgia    [3]   Past Surgical History:  Procedure Laterality Date    LUNG REMOVAL, TOTAL      right    [4]   Family History  Problem Relation Name Age of Onset    Cirrhosis Father      No Known Problems Mother      No Known Problems Daughter      No Known Problems Daughter

## 2025-06-13 NOTE — ASSESSMENT & PLAN NOTE
Trop mildly elevated 24>116  Will get one more trop  No chest pain reported  She is back to baseline now after grant placement  Likely NIMI given hypertensive urgency on arrival given her pain  Monitor on tele for 24 hrs

## 2025-06-13 NOTE — H&P
H&P - Hospitalist   Name: Francesca Retana 79 y.o. female I MRN: [id number]  Unit/Bed#: -01 I Date of Admission: 6/13/2025   Date of Service: 6/13/2025 I Hospital Day: 0     Assessment & Plan  Left renal mass  1.6 cm lesion on the left kidney worrisome for RCC  Apparently this was being followed by outpt providers for a while and she had an MRI 6/6 which showed concern for malignancy  Urology consultation  Multiple sclerosis (HCC)  History of multiple sclerosis.  She was taken off her medications at the time that she was diagnosed with lung cancer.  She has imbalance issues and decreased sensation from this   Trigeminal neuralgia  Continue carbamazepine  Urinary retention  She was found to have acute urinary retention in the ER and Grant catheter was placed  CTAP showed possible cystitis, 1.6 cm lesion on the left kidney worrisome for RCC.  No evidence of hydronephrosis or nephrolithiasis  Will start on scheduled bowel regimen  Urology consultation for grant management   Cystitis  Recurrent issue for which she takes Keflex   Bladder on CT scan looked concerning for cystitis and she has urinary retention which could be from this versus her MS  Ceftriaxone was started and will be continued  Follow-up urine culture  Elevated troponin  Trop mildly elevated 24>116  Will get one more trop  No chest pain reported  She is back to baseline now after grant placement  Likely NIMI given hypertensive urgency on arrival given her pain  Monitor on tele for 24 hrs       VTE Pharmacologic Prophylaxis:   Moderate Risk (Score 3-4) - Pharmacological DVT Prophylaxis Ordered: enoxaparin (Lovenox).  Code Status: Level 1 - Full Code dw pt   Discussion with family: Patient declined call to .     Anticipated Length of Stay: Patient will be admitted on an inpatient basis with an anticipated length of stay of greater than 2 midnights secondary to acute urinary retention, L renal mass .    History of Present Illness   Chief  Complaint: lower abdominal pain    Francesca Retana is a 79 y.o. female with a PMH of MS, lung cancer who presents with severe lower abdominal/pelvic pain. She reported her pain was on and off for the last week but around 3 AM it became severe and she noted she was not able to urinate at all. She has a history of MS and decreased perineal sensation so she at times has urinary incontinence. However, last night she felt severe pain and felt as if her bladder was full. She also has issues with recurrent UTIs and she takes keflex. She says she had not needed a grant catheter in the past. Currently she feels well and pretty much back to baseline and her pain was relieved as soon as the grant was placed. She was being followed by outpt Urology for the kidney mass for which she has a recent MRI that showed concern for malignancy. No chest pain or history of Mi or CVA.     Review of Systems    Historical Information   Past Medical History[1]  Past Surgical History[2]  Social History[3]  E-Cigarette/Vaping    E-Cigarette Use Never User      E-Cigarette/Vaping Substances    Nicotine No     THC No     CBD No TINCTURE    Flavoring No     Other No     Unknown No      Family History[4]  Social History:  Marital Status:    Occupation: none  Patient Pre-hospital Living Situation: Home  Patient Pre-hospital Level of Mobility: walks with walker  Patient Pre-hospital Diet Restrictions: none    Meds/Allergies   I have reviewed home medications with patient personally.  Prior to Admission medications    Medication Sig Start Date End Date Taking? Authorizing Provider   albuterol (PROVENTIL HFA,VENTOLIN HFA) 90 mcg/act inhaler Inhale 2 puffs every 6 (six) hours as needed for shortness of breath or wheezing 3/11/22   Historical Provider, MD   aspirin (ECOTRIN LOW STRENGTH) 81 mg EC tablet Take 81 mg by mouth daily    Historical Provider, MD   carBAMazepine (TEGretol XR) 200 mg 12 hr tablet Take 200 mg by mouth 2 (two) times a day     Historical Provider, MD   Cholecalciferol (VITAMIN D3) 1,000 units tablet Take 1,000 Units by mouth daily    Historical Provider, MD   cyanocobalamin (VITAMIN B-12) 100 mcg tablet Take 100 mcg by mouth daily    Historical Provider, MD   estradiol (ESTRACE VAGINAL) 0.1 mg/g vaginal cream Apply pea sized amount around urethra and inside vaginal opening 3 times per week  Patient not taking: Reported on 1/4/2025 4/18/24   Sarah Schnitzler, PA-C   ipratropium (Atrovent HFA) 17 mcg/act inhaler Inhale 2 puffs 2 (two) times a day  Patient not taking: Reported on 1/9/2025 1/8/25 2/7/25  Felice Moore MD   triamcinolone (KENALOG) 0.1 % ointment PLEASE SEE ATTACHED FOR DETAILED DIRECTIONS 9/9/24   Historical Provider, MD     Allergies   Allergen Reactions    Sulfa Antibiotics Other (See Comments)     Forms crystals in the urine        Objective :  Temp:  [97.5 °F (36.4 °C)] 97.5 °F (36.4 °C)  HR:  [] 87  BP: (119-203)/() 119/69  Resp:  [17-22] 18  SpO2:  [94 %-97 %] 94 %  O2 Device: None (Room air)    Physical Exam   NAD  Nonlabored resp  Rrr  Ntnd   Aaox3  Moser in place  Equal upper and lower ext strength and sensation    Lines/Drains:  Lines/Drains/Airways       Active Status       Name Placement date Placement time Site Days    Urethral Catheter 18 Fr. 06/13/25  0740  --  less than 1                  Urinary Catheter:  Goal for removal: Follow urology recs               Lab Results: I have reviewed the following results:  Results from last 7 days   Lab Units 06/13/25  0513   WBC Thousand/uL 5.58   HEMOGLOBIN g/dL 12.1   HEMATOCRIT % 37.6   PLATELETS Thousands/uL 233   SEGS PCT % 49   LYMPHO PCT % 36   MONO PCT % 9   EOS PCT % 5     Results from last 7 days   Lab Units 06/13/25  0513   SODIUM mmol/L 142   POTASSIUM mmol/L 3.5   CHLORIDE mmol/L 105   CO2 mmol/L 29   BUN mg/dL 17   CREATININE mg/dL 0.91   ANION GAP mmol/L 8   CALCIUM mg/dL 9.2   ALBUMIN g/dL 3.9   TOTAL BILIRUBIN mg/dL 0.38   ALK PHOS U/L 99   ALT  "U/L 8   AST U/L 13   GLUCOSE RANDOM mg/dL 103             No results found for: \"HGBA1C\"  Results from last 7 days   Lab Units 06/13/25  0513   LACTIC ACID mmol/L 1.7       Imaging Results Review: I reviewed radiology reports from this admission including: CT abdomen/pelvis.  Other Study Results Review: EKG was reviewed.     Administrative Statements   I have spent a total time of 40+ minutes in caring for this patient on the day of the visit/encounter including Risks and benefits of tx options, Instructions for management, Patient and family education, Counseling / Coordination of care, and Documenting in the medical record.    ** Please Note: This note has been constructed using a voice recognition system. **         [1]   Past Medical History:  Diagnosis Date    Cancer (HCC)     only has the left lung     MS (multiple sclerosis) (HCC)     Neurogenic bladder     Trigeminal neuralgia    [2]   Past Surgical History:  Procedure Laterality Date    LUNG REMOVAL, TOTAL      right    [3]   Social History  Tobacco Use    Smoking status: Former     Passive exposure: Past (as a teenager growing up)    Smokeless tobacco: Never   Vaping Use    Vaping status: Never Used   Substance and Sexual Activity    Alcohol use: Yes     Comment: Occ.    Drug use: Never    Sexual activity: Not Currently   [4]   Family History  Problem Relation Name Age of Onset    Cirrhosis Father      No Known Problems Mother      No Known Problems Daughter      No Known Problems Daughter       "

## 2025-06-13 NOTE — ASSESSMENT & PLAN NOTE
She was found to have acute urinary retention in the ER and Grant catheter was placed  CTAP showed possible cystitis, 1.6 cm lesion on the left kidney worrisome for RCC.  No evidence of hydronephrosis or nephrolithiasis  Will start on scheduled bowel regimen  Urology consultation for grant management

## 2025-06-13 NOTE — ASSESSMENT & PLAN NOTE
1.6 cm lesion on the left kidney worrisome for RCC  Apparently this was being followed by outpt providers for a while and she had an MRI 6/6 which showed concern for malignancy  Urology consultation

## 2025-06-13 NOTE — ASSESSMENT & PLAN NOTE
- Presenting with acute onset inability to urinate and abdominal pain  - Failed urinary retention protocol and grant catheter inserted.   - UA micro positive for innumerable RBCs and WBCs. No bacteria. Urine culture and BC in process.   - Lactate normal. No leukocytosis.   - CT showing circumferential bladder wall thickening with perivesicular fat stranding 1.6 cm enhancing lesion in left kidney upper pole.   - Continue antibiosis, adjust based on cultures  - Continue outpatient recurrent UTI prevention  - Maintain grant until completion of antibiotic course and schedule outpatient voiding trial with urologist, currently following with [facility]

## 2025-06-13 NOTE — ASSESSMENT & PLAN NOTE
- Known small left renal mass managed on active surveillance.   - CT showing stable size of 1.6 cm enhancing left renal mass   - Outpatient management regarding ongoing active surveillance vs definitive therapies. She now follows with [facility] urology

## 2025-06-13 NOTE — PLAN OF CARE
Problem: INFECTION - ADULT  Goal: Absence or prevention of progression during hospitalization  Description: INTERVENTIONS:  - Assess and monitor for signs and symptoms of infection  - Monitor lab/diagnostic results  - Monitor all insertion sites, i.e. indwelling lines, tubes, and drains  - Monitor endotracheal if appropriate and nasal secretions for changes in amount and color  - Wilmer appropriate cooling/warming therapies per order  - Administer medications as ordered  - Instruct and encourage patient and family to use good hand hygiene technique  - Identify and instruct in appropriate isolation precautions for identified infection/condition  Outcome: Progressing     Problem: GENITOURINARY - ADULT  Goal: Maintains or returns to baseline urinary function  Description: INTERVENTIONS:  - Assess urinary function  - Encourage oral fluids to ensure adequate hydration if ordered  - Administer IV fluids as ordered to ensure adequate hydration  - Administer ordered medications as needed  - Offer frequent toileting  - Follow urinary retention protocol if ordered  Outcome: Progressing  Goal: Absence of urinary retention  Description: INTERVENTIONS:  - Assess patient’s ability to void and empty bladder  - Monitor I/O  - Bladder scan as needed  - Discuss with physician/AP medications to alleviate retention as needed  - Discuss catheterization for long term situations as appropriate  Outcome: Progressing  Goal: Urinary catheter remains patent  Description: INTERVENTIONS:  - Assess patency of urinary catheter  - If patient has a chronic grant, consider changing catheter if non-functioning  - Follow guidelines for intermittent irrigation of non-functioning urinary catheter  Outcome: Progressing

## 2025-06-13 NOTE — ASSESSMENT & PLAN NOTE
Recurrent issue for which she takes Keflex   Bladder on CT scan looked concerning for cystitis and she has urinary retention which could be from this versus her MS  Ceftriaxone was started and will be continued  Follow-up urine culture

## 2025-06-13 NOTE — ED PROVIDER NOTES
Time reflects when diagnosis was documented in both MDM as applicable and the Disposition within this note       Time User Action Codes Description Comment    6/13/2025  7:17 AM Charlie, Rohit Add [R33.9] Urinary retention     6/13/2025  7:17 AM Rohit Guerra Add [N28.89] Left renal mass     6/13/2025  7:34 AM [place], Jodie Add [R10.9] Abdominal pain     6/13/2025  7:34 AM [place] melgoza Add [K85.90] Pancreatitis     6/13/2025  7:34 AM [place], Jodie Add [N39.0] UTI (urinary tract infection)     6/13/2025  7:34 AM [place] melgoza Modify [R33.9] Urinary retention           ED Disposition       ED Disposition   Admit    Condition   Stable    Date/Time   Fri Jun 13, 2025  7:34 AM    Comment   Case was discussed with MARYSE Cabral and the patient's admission status was agreed to be Admission Status: observation status to the service of Dr. Varela .               Assessment & Plan       Medical Decision Making  Patient is a 79-year-old female lung cancer, s/p right lung removal, MS, who presents to the emergency room for urinary retention.  Patient reports intermittent urinary retention x 1 week.  Reports urinary retention worse tonight with associated abdominal pain.  Denies any other symptoms.  Takes baby aspirin daily.    Hypertensive and tachycardic secondary to pain on arrival. Improvement after straight cath. Moser placed due to persistent urinary retention.  UA infectious.  Normal lactic acid. No white count. Normal creatinine. Lipase 1,079. CT A/P with cystitis and concern for renal cell carcinoma. Pending blood cultures. Given IV fluids, ceftriaxone, pain medication in the ER.  Discussed ER results and plan with patient.  Discussed with internal medicine for admission.  Patient understands and consents to admission.    Amount and/or Complexity of Data Reviewed  Labs: ordered. Decision-making details documented in ED Course.  Radiology: ordered and independent interpretation performed.    Risk  OTC  drugs.  Prescription drug management.  Decision regarding hospitalization.        ED Course as of 06/13/25 0749   Fri Jun 13, 2025   0550 Leukocytes, UA(!): Large   0550 Blood, UA(!): Moderate   0552 WBC, UA(!): Innumerable   0552 RBC Urine(!): Innumerable   0645 LIPASE(!): 1,079       Medications   sodium chloride 0.9 % bolus 1,000 mL (1,000 mL Intravenous New Bag 6/13/25 0748)   ceftriaxone (ROCEPHIN) 1 g/50 mL in dextrose IVPB (1,000 mg Intravenous New Bag 6/13/25 0746)   acetaminophen (TYLENOL) tablet 650 mg (650 mg Oral Given 6/13/25 0554)   iohexol (OMNIPAQUE) 350 MG/ML injection (MULTI-DOSE) 100 mL (100 mL Intravenous Given 6/13/25 0612)   fentaNYL injection 50 mcg (50 mcg Intravenous Given 6/13/25 0745)       ED Risk Strat Scores   HEART Risk Score      Flowsheet Row Most Recent Value   Heart Score Risk Calculator    History 0 Filed at: 06/13/2025 0657   ECG 0 Filed at: 06/13/2025 0657   Age 2 Filed at: 06/13/2025 0657   Risk Factors 2 Filed at: 06/13/2025 0657   Troponin 1 Filed at: 06/13/2025 0657   HEART Score 5 Filed at: 06/13/2025 0657          HEART Risk Score      Flowsheet Row Most Recent Value   Heart Score Risk Calculator    History 0 Filed at: 06/13/2025 0657   ECG 0 Filed at: 06/13/2025 0657   Age 2 Filed at: 06/13/2025 0657   Risk Factors 2 Filed at: 06/13/2025 0657   Troponin 1 Filed at: 06/13/2025 0657   HEART Score 5 Filed at: 06/13/2025 0657                      No data recorded                            History of Present Illness       Chief Complaint   Patient presents with    Abdominal Pain     Pt hx of kidney stones, bladder pain and abdominal pain, known lung cancer, hasn't peed in 6 hrs, bladder scan volume 192ml       Past Medical History[1]   Past Surgical History[2]   Family History[3]   Social History[4]   E-Cigarette/Vaping    E-Cigarette Use Never User       E-Cigarette/Vaping Substances    Nicotine No     THC No     CBD No TINCTURE    Flavoring No     Other No     Unknown No        I have reviewed and agree with the history as documented.     Patient is a 79-year-old female who presents to the emergency room for urinary retention.      Abdominal Pain  Associated symptoms: no chest pain, no chills, no constipation, no cough, no diarrhea, no dysuria, no fever, no hematuria, no nausea, no shortness of breath, no sore throat, no vaginal bleeding, no vaginal discharge and no vomiting        Review of Systems   Constitutional:  Negative for chills and fever.   HENT:  Negative for ear pain, sore throat, trouble swallowing and voice change.    Eyes:  Negative for pain and visual disturbance.   Respiratory:  Negative for cough and shortness of breath.    Cardiovascular:  Negative for chest pain and palpitations.   Gastrointestinal:  Positive for abdominal pain. Negative for blood in stool, constipation, diarrhea, nausea and vomiting.   Genitourinary:  Positive for difficulty urinating. Negative for dysuria, flank pain, hematuria, vaginal bleeding and vaginal discharge.   Musculoskeletal:  Negative for arthralgias and back pain.   Skin:  Negative for color change and rash.   Neurological:  Negative for seizures, syncope and headaches.   Psychiatric/Behavioral:  Negative for confusion.    All other systems reviewed and are negative.          Objective       ED Triage Vitals [06/13/25 0457]   Temperature Pulse Blood Pressure Respirations SpO2 Patient Position - Orthostatic VS   97.5 °F (36.4 °C) (!) 109 (!) 203/106 17 97 % Lying      Temp Source Heart Rate Source BP Location FiO2 (%) Pain Score    Oral Monitor Right arm -- 10 - Worst Possible Pain      Vitals      Date and Time Temp Pulse SpO2 Resp BP Pain Score FACES Pain Rating User   06/13/25 0600 -- 82 97 % 18 143/69 -- --    06/13/25 0530 -- 83 95 % 22 -- -- --    06/13/25 0515 -- 86 95 % 21 154/74 -- --    06/13/25 0457 97.5 °F (36.4 °C) 109 97 % 17 203/106 10 - Worst Possible Pain --             Physical Exam  Vitals and nursing note  reviewed.   Constitutional:       General: She is not in acute distress.     Appearance: She is well-developed.   HENT:      Head: Normocephalic and atraumatic.     Eyes:      Conjunctiva/sclera: Conjunctivae normal.       Cardiovascular:      Rate and Rhythm: Normal rate and regular rhythm.      Pulses: Normal pulses.      Heart sounds: No murmur heard.  Pulmonary:      Effort: Pulmonary effort is normal. No respiratory distress.      Breath sounds: Normal breath sounds.   Abdominal:      Palpations: Abdomen is soft.      Tenderness: There is no abdominal tenderness. There is no right CVA tenderness or left CVA tenderness.     Musculoskeletal:         General: No swelling.      Cervical back: Normal range of motion and neck supple.     Skin:     General: Skin is warm and dry.      Capillary Refill: Capillary refill takes less than 2 seconds.     Neurological:      General: No focal deficit present.      Mental Status: She is alert and oriented to person, place, and time.     Psychiatric:         Mood and Affect: Mood normal.         Results Reviewed       Procedure Component Value Units Date/Time    Blood culture #2 [[id number]] Collected: 06/13/25 0726    Lab Status: In process Specimen: Blood from Arm, Left Updated: 06/13/25 0731    Blood culture #1 [[id number]] Collected: 06/13/25 0729    Lab Status: In process Specimen: Blood from Arm, Left Updated: 06/13/25 0731    HS Troponin I 2hr [[id number]] Collected: 06/13/25 0726    Lab Status: In process Specimen: Blood from Arm, Left Updated: 06/13/25 0731    HS Troponin I 4hr [[id number]]     Lab Status: No result Specimen: Blood     Comprehensive metabolic panel [[id number]] Collected: 06/13/25 0513    Lab Status: Final result Specimen: Blood from Arm, Right Updated: 06/13/25 0559     Sodium 142 mmol/L      Potassium 3.5 mmol/L      Chloride 105 mmol/L      CO2 29 mmol/L      ANION GAP 8 mmol/L      BUN 17 mg/dL      Creatinine 0.91 mg/dL      Glucose 103 mg/dL       Calcium 9.2 mg/dL      AST 13 U/L      ALT 8 U/L      Alkaline Phosphatase 99 U/L      Total Protein 7.1 g/dL      Albumin 3.9 g/dL      Total Bilirubin 0.38 mg/dL      eGFR 59 ml/min/1.73sq m     Narrative:      National Kid[employer] Foundation guidelines for Chronic Kidney Disease (CKD):     Stage 1 with normal or high GFR (GFR > 90 mL/min/1.73 square meters)    Stage 2 Mild CKD (GFR = 60-89 mL/min/1.73 square meters)    Stage 3A Moderate CKD (GFR = 45-59 mL/min/1.73 square meters)    Stage 3B Moderate CKD (GFR = 30-44 mL/min/1.73 square meters)    Stage 4 Severe CKD (GFR = 15-29 mL/min/1.73 square meters)    Stage 5 End Stage CKD (GFR <15 mL/min/1.73 square meters)  Note: GFR calculation is accurate only with a steady state creatinine    Lipase [[id number]]  (Abnormal) Collected: 06/13/25 0513    Lab Status: Final result Specimen: Blood from Arm, Right Updated: 06/13/25 0559     Lipase 1,079 u/L     Urine Microscopic [[id number]]  (Abnormal) Collected: 06/13/25 0516    Lab Status: Final result Specimen: Urine, Straight Cath Updated: 06/13/25 0551     RBC, UA Innumerable /hpf      WBC, UA Innumerable /hpf      Epithelial Cells Occasional /hpf      Bacteria, UA None Seen /hpf      Amorphous Crystals, UA Occasional    Urine culture [[id number]] Collected: 06/13/25 0516    Lab Status: In process Specimen: Urine, Straight Cath Updated: 06/13/25 0551    HS Troponin 0hr (reflex protocol) [[id number]]  (Normal) Collected: 06/13/25 0513    Lab Status: Final result Specimen: Blood from Arm, Right Updated: 06/13/25 0548     hs TnI 0hr 24 ng/L     UA w Reflex to Microscopic w Reflex to Culture [[id number]]  (Abnormal) Collected: 06/13/25 0516    Lab Status: Final result Specimen: Urine, Straight Cath Updated: 06/13/25 0547     Color, UA Light Yellow     Clarity, UA Turbid     Specific Gravity, UA 1.009     pH, UA 7.0     Leukocytes, UA Large     Nitrite, UA Negative     Protein, UA 50 (1+) mg/dl      Glucose, UA Negative mg/dl       Ketones, UA Negative mg/dl      Urobilinogen, UA <2.0 mg/dl      Bilirubin, UA Negative     Occult Blood, UA Moderate    Lactic acid, plasma (w/reflex if result > 2.0) [[id number]]  (Normal) Collected: 06/13/25 0513    Lab Status: Final result Specimen: Blood from Arm, Right Updated: 06/13/25 0546     LACTIC ACID 1.7 mmol/L     Narrative:      Result may be elevated if tourniquet was used during collection.    CBC and differential [[id number]] Collected: 06/13/25 0513    Lab Status: Final result Specimen: Blood from Arm, Right Updated: 06/13/25 0522     WBC 5.58 Thousand/uL      RBC 3.93 Million/uL      Hemoglobin 12.1 g/dL      Hematocrit 37.6 %      MCV 96 fL      MCH 30.8 pg      MCHC 32.2 g/dL      RDW 14.7 %      MPV 10.0 fL      Platelets 233 Thousands/uL      nRBC 0 /100 WBCs      Segmented % 49 %      Immature Grans % 0 %      Lymphocytes % 36 %      Monocytes % 9 %      Eosinophils Relative 5 %      Basophils Relative 1 %      Absolute Neutrophils 2.75 Thousands/µL      Absolute Immature Grans 0.01 Thousand/uL      Absolute Lymphocytes 2.00 Thousands/µL      Absolute Monocytes 0.51 Thousand/µL      Eosinophils Absolute 0.26 Thousand/µL      Basophils Absolute 0.05 Thousands/µL             CT abdomen pelvis with contrast   Final Interpretation by Tevin Rush MD (06/13 0636)      1.  Circumferential bladder wall thickening with perivesicular fat stranding, worrisome for cystitis. Correlate with urinalysis.   2.  Redemonstrated 1.6 cm contrast-enhancing lesion along the upper pole the left kidney, which remains worrisome for renal cell carcinoma.         Workstation performed: [id number]         XR chest 1 view portable   ED Interpretation by Jodie Mcnamara PA-C (06/13 0538)   S/p right lung removal          ECG 12 Lead Documentation Only    Date/Time: 6/13/2025 5:10 AM    Performed by: Jodie Mcnamara PA-C  Authorized by: Jodie Mcnamara PA-C    Indications / Diagnosis:  Cardiac work-up  ECG reviewed  by me, the ED Provider: yes    Patient location:  ED  Interpretation:     Interpretation: normal    Rate:     ECG rate:  91    ECG rate assessment: normal    Rhythm:     Rhythm: sinus rhythm    ST segments:     ST segments:  Normal  T waves:     T waves: normal        ED Medication and Procedure Management   Prior to Admission Medications   Prescriptions Last Dose Informant Patient Reported? Taking?   Cholecalciferol (VITAMIN D3) 1,000 units tablet   Yes No   Sig: Take 1,000 Units by mouth daily   albuterol (PROVENTIL HFA,VENTOLIN HFA) 90 mcg/act inhaler  Self Yes No   Sig: Inhale 2 puffs every 6 (six) hours as needed for shortness of breath or wheezing   aspirin (ECOTRIN LOW STRENGTH) 81 mg EC tablet   Yes No   Sig: Take 81 mg by mouth daily   carBAMazepine (TEGretol XR) 200 mg 12 hr tablet   Yes No   Sig: Take 200 mg by mouth 2 (two) times a day   cyanocobalamin (VITAMIN B-12) 100 mcg tablet   Yes No   Sig: Take 100 mcg by mouth daily   estradiol (ESTRACE VAGINAL) 0.1 mg/g vaginal cream  Self No No   Sig: Apply pea sized amount around urethra and inside vaginal opening 3 times per week   Patient not taking: Reported on 1/4/2025   ipratropium (Atrovent HFA) 17 mcg/act inhaler   No No   Sig: Inhale 2 puffs 2 (two) times a day   Patient not taking: Reported on 1/9/2025   triamcinolone (KENALOG) 0.1 % ointment  Self Yes No   Sig: PLEASE SEE ATTACHED FOR DETAILED DIRECTIONS      Facility-Administered Medications: None     Patient's Medications   Discharge Prescriptions    No medications on file     No discharge procedures on file.  ED SEPSIS DOCUMENTATION   Time reflects when diagnosis was documented in both MDM as applicable and the Disposition within this note       Time User Action Codes Description Comment    6/13/2025  7:17 AM Rohit Guerra Add [R33.9] Urinary retention     6/13/2025  7:17 AM Rohit Guerra Add [N28.89] Left renal mass     6/13/2025  7:34 AM Jodie Mcnamara Add [R10.9] Abdominal pain     6/13/2025  7:34  AM Jodie Mcnamara Add [K85.90] Pancreatitis     6/13/2025  7:34 AM Jodie Mcnamara Add [N39.0] UTI (urinary tract infection)     6/13/2025  7:34 Jodie Hutchins Modify [R33.9] Urinary retention                      [1]   Past Medical History:  Diagnosis Date    Cancer (HCC)     only has the left lung     MS (multiple sclerosis) (HCC)     Neurogenic bladder     Trigeminal neuralgia    [2]   Past Surgical History:  Procedure Laterality Date    LUNG REMOVAL, TOTAL      right    [3]   Family History  Problem Relation Name Age of Onset    Cirrhosis Father      No Known Problems Mother      No Known Problems Daughter      No Known Problems Daughter     [4]   Social History  Tobacco Use    Smoking status: Former     Passive exposure: Past (as a teenager growing up)    Smokeless tobacco: Never   Vaping Use    Vaping status: Never Used   Substance Use Topics    Alcohol use: Yes     Comment: Occ.    Drug use: Never        Jodie Mcnamara PA-C  06/13/25 0749

## 2025-06-13 NOTE — ASSESSMENT & PLAN NOTE
History of multiple sclerosis.  She was taken off her medications at the time that she was diagnosed with lung cancer.  She has imbalance issues and decreased sensation from this

## 2025-06-13 NOTE — ASSESSMENT & PLAN NOTE
Patient presented with abdominal pain and was found to have elevated lipase to greater than thousand concerning for pancreatitis.

## 2025-06-14 LAB — BACTERIA UR CULT: NORMAL

## 2025-06-14 PROCEDURE — 99232 SBSQ HOSP IP/OBS MODERATE 35: CPT | Performed by: STUDENT IN AN ORGANIZED HEALTH CARE EDUCATION/TRAINING PROGRAM

## 2025-06-14 RX ADMIN — Medication 1000 UNITS: at 08:03

## 2025-06-14 RX ADMIN — ALBUTEROL SULFATE 2 PUFF: 90 AEROSOL, METERED RESPIRATORY (INHALATION) at 08:04

## 2025-06-14 RX ADMIN — ASPIRIN 81 MG: 81 TABLET, COATED ORAL at 08:03

## 2025-06-14 RX ADMIN — CEFTRIAXONE SODIUM 1000 MG: 10 INJECTION, POWDER, FOR SOLUTION INTRAVENOUS at 06:05

## 2025-06-14 RX ADMIN — SENNOSIDES 8.6 MG: 8.6 TABLET, FILM COATED ORAL at 08:03

## 2025-06-14 RX ADMIN — ACETAMINOPHEN 650 MG: 325 TABLET ORAL at 08:03

## 2025-06-14 RX ADMIN — POLYETHYLENE GLYCOL 3350 17 G: 17 POWDER, FOR SOLUTION ORAL at 08:03

## 2025-06-14 RX ADMIN — VITAM B12 100 MCG: 100 TAB at 08:03

## 2025-06-14 RX ADMIN — ENOXAPARIN SODIUM 40 MG: 40 INJECTION SUBCUTANEOUS at 08:03

## 2025-06-14 RX ADMIN — CARBAMAZEPINE 200 MG: 100 TABLET, EXTENDED RELEASE ORAL at 08:05

## 2025-06-14 RX ADMIN — SENNOSIDES 8.6 MG: 8.6 TABLET, FILM COATED ORAL at 17:31

## 2025-06-14 RX ADMIN — CARBAMAZEPINE 200 MG: 100 TABLET, EXTENDED RELEASE ORAL at 17:31

## 2025-06-14 RX ADMIN — METHENAMINE HIPPURATE 1 G: 1 TABLET ORAL at 08:04

## 2025-06-14 NOTE — ASSESSMENT & PLAN NOTE
She was found to have acute urinary retention in the ER and Grant catheter was placed  CTAP showed possible cystitis, 1.6 cm lesion on the left kidney worrisome for RCC.  No evidence of hydronephrosis or nephrolithiasis  Will start on scheduled bowel regimen  Urology consultation for grant management  -they recommended discharging with Grant catheter however the patient feels uncomfortable with this.  Will try a voiding trial tomorrow 6/15 AM in the meantime we will continue antibiotics and give an aggressive bowel regimen

## 2025-06-14 NOTE — PROGRESS NOTES
Progress Note - Hospitalist   Name: Francesca Retana 79 y.o. female I MRN: [id number]  Unit/Bed#: -01 I Date of Admission: 6/13/2025   Date of Service: 6/14/2025 I Hospital Day: 0    Assessment & Plan  Left renal mass  1.6 cm lesion on the left kidney worrisome for RCC  Apparently this was being followed by outpt providers for a while and she had an MRI 6/6 which showed concern for malignancy  Urology consultation -recommended continuing following up with outpatient urologist at [facility] for further workup and management  Multiple sclerosis (HCC)  History of multiple sclerosis.  She was taken off her medications at the time that she was diagnosed with lung cancer.  She has imbalance issues and decreased sensation from this   Trigeminal neuralgia  Continue carbamazepine  Urinary retention  She was found to have acute urinary retention in the ER and Grant catheter was placed  CTAP showed possible cystitis, 1.6 cm lesion on the left kidney worrisome for RCC.  No evidence of hydronephrosis or nephrolithiasis  Will start on scheduled bowel regimen  Urology consultation for grant management  -they recommended discharging with Grant catheter however the patient feels uncomfortable with this.  Will try a voiding trial tomorrow 6/15 AM in the meantime we will continue antibiotics and give an aggressive bowel regimen  Cystitis  Recurrent issue for which she takes Keflex   Bladder on CT scan looked concerning for cystitis and she has urinary retention which could be from this versus her MS  Ceftriaxone was started and will be continued  Follow-up urine culture  Elevated troponin  Trop mildly elevated 24>116  Will get one more trop  No chest pain reported  She is back to baseline now after grant placement  Likely NIMI given hypertensive urgency on arrival given her pain  Monitor on tele for 24 hrs     VTE Pharmacologic Prophylaxis:   Moderate Risk (Score 3-4) - Pharmacological DVT Prophylaxis Ordered: enoxaparin  (Lovenox).    Mobility:   Basic Mobility Inpatient Raw Score: 21  JH-HLM Goal: 6: Walk 10 steps or more  JH-HLM Achieved: 6: Walk 10 steps or more  JH-HLM Goal achieved. Continue to encourage appropriate mobility.    Patient Centered Rounds: I performed bedside rounds with nursing staff today.   Discussions with Specialists or Other Care Team Provider: none    Education and Discussions with Family / Patient: Patient declined call to .     Current Length of Stay: 0 day(s)  Current Patient Status: Inpatient   Certification Statement: The patient will continue to require additional inpatient hospital stay due to Cystitis, Urinary retention  Discharge Plan: Anticipate discharge tomorrow to home with home services.    Code Status: Level 1 - Full Code    Subjective   Patient feels like she is improving.  Denies any chest pain, shortness of breath, fevers, chills.    Objective :  Temp:  [97.2 °F (36.2 °C)-98.3 °F (36.8 °C)] 97.2 °F (36.2 °C)  HR:  [86-92] 86  BP: (120-145)/(67-76) 138/76  Resp:  [16] 16  SpO2:  [92 %-96 %] 94 %  O2 Device: None (Room air)    Body mass index is 23.19 kg/m².     Input and Output Summary (last 24 hours):     Intake/Output Summary (Last 24 hours) at 6/14/2025 1203  Last data filed at 6/14/2025 1029  Gross per 24 hour   Intake 240 ml   Output 1925 ml   Net -1685 ml       Physical Exam  No acute distress  Nonlabored resp on room air  RRR  NTND abd  [id number]    Lines/Drains:  Lines/Drains/Airways       Active Status       Name Placement date Placement time Site Days    Urethral Catheter 18 Fr. 06/13/25  0740  --  1                  Urinary Catheter:  Goal for removal: Planning for voiding trial tomorrow 6/15                 Lab Results: I have reviewed the following results:   Results from last 7 days   Lab Units 06/13/25  0513   WBC Thousand/uL 5.58   HEMOGLOBIN g/dL 12.1   HEMATOCRIT % 37.6   PLATELETS Thousands/uL 233   SEGS PCT % 49   LYMPHO PCT % 36   MONO PCT % 9   EOS PCT % 5      Results from last 7 days   Lab Units 06/13/25  0513   SODIUM mmol/L 142   POTASSIUM mmol/L 3.5   CHLORIDE mmol/L 105   CO2 mmol/L 29   BUN mg/dL 17   CREATININE mg/dL 0.91   ANION GAP mmol/L 8   CALCIUM mg/dL 9.2   ALBUMIN g/dL 3.9   TOTAL BILIRUBIN mg/dL 0.38   ALK PHOS U/L 99   ALT U/L 8   AST U/L 13   GLUCOSE RANDOM mg/dL 103                 Results from last 7 days   Lab Units 06/13/25  0513   LACTIC ACID mmol/L 1.7       Recent Cultures (last 7 days):   Results from last 7 days   Lab Units 06/13/25  0729 06/13/25  0726 06/13/25  0516   BLOOD CULTURE  No Growth at 24 hrs. No Growth at 24 hrs.  --    URINE CULTURE   --   --  No Growth <1000 cfu/mL       Imaging Results Review: No pertinent imaging studies reviewed.  Other Study Results Review: No additional pertinent studies reviewed.    Last 24 Hours Medication List:     Current Facility-Administered Medications:     acetaminophen (TYLENOL) tablet 650 mg, Q6H PRN    albuterol (PROVENTIL HFA,VENTOLIN HFA) inhaler 2 puff, Q6H PRN    aspirin (ECOTRIN LOW STRENGTH) EC tablet 81 mg, Daily    carBAMazepine (TEGretol XR) 12 hr tablet 200 mg, BID    cefTRIAXone (ROCEPHIN) 1,000 mg in dextrose 5 % 50 mL IVPB, Q24H, Last Rate: 1,000 mg (06/14/25 0605)    Cholecalciferol (VITAMIN D3) tablet 1,000 Units, Daily    cyanocobalamin (VITAMIN B-12) tablet 100 mcg, Daily    enoxaparin (LOVENOX) subcutaneous injection 40 mg, Daily    polyethylene glycol (MIRALAX) packet 17 g, Daily    senna (SENOKOT) tablet 8.6 mg, BID    Administrative Statements   Today, Patient Was Seen By: Rohit Guerra MD      **Please Note: This note may have been constructed using a voice recognition system.**

## 2025-06-14 NOTE — PLAN OF CARE
Problem: INFECTION - ADULT  Goal: Absence or prevention of progression during hospitalization  Description: INTERVENTIONS:  - Assess and monitor for signs and symptoms of infection  - Monitor lab/diagnostic results  - Monitor all insertion sites, i.e. indwelling lines, tubes, and drains  - Monitor endotracheal if appropriate and nasal secretions for changes in amount and color  - [place] appropriate cooling/warming therapies per order  - Administer medications as ordered  - Instruct and encourage patient and family to use good hand hygiene technique  - Identify and instruct in appropriate isolation precautions for identified infection/condition  Outcome: Progressing     Problem: GENITOURINARY - ADULT  Goal: Maintains or returns to baseline urinary function  Description: INTERVENTIONS:  - Assess urinary function  - Encourage oral fluids to ensure adequate hydration if ordered  - Administer IV fluids as ordered to ensure adequate hydration  - Administer ordered medications as needed  - Offer frequent toileting  - Follow urinary retention protocol if ordered  Outcome: Progressing  Goal: Absence of urinary retention  Description: INTERVENTIONS:  - Assess patient’s ability to void and empty bladder  - Monitor I/O  - Bladder scan as needed  - Discuss with physician/AP medications to alleviate retention as needed  - Discuss catheterization for long term situations as appropriate  Outcome: Progressing  Goal: Urinary catheter remains patent  Description: INTERVENTIONS:  - Assess patency of urinary catheter  - If patient has a chronic grant, consider changing catheter if non-functioning  - Follow guidelines for intermittent irrigation of non-functioning urinary catheter  Outcome: Progressing

## 2025-06-14 NOTE — PLAN OF CARE
Problem: SAFETY ADULT  Goal: Patient will remain free of falls  Description: INTERVENTIONS:  - Educate patient/family on patient safety including physical limitations  - Instruct patient to call for assistance with activity   - Consider consulting OT/PT to assist with strengthening/mobility based on AM PAC & JH-HLM score  - Consult OT/PT to assist with strengthening/mobility   - Keep Call bell within reach  - Keep bed low and locked with side rails adjusted as appropriate  - Keep care items and personal belongings within reach  - Initiate and maintain comfort rounds  - Make Fall Risk Sign visible to staff  - Offer Toileting every 2 Hours, in advance of need  - Initiate/Maintain bed alarm  - Obtain necessary fall risk management equipment:     - Apply yellow socks and bracelet for high fall risk patients  - Consider moving patient to room near nurses station  Outcome: Progressing

## 2025-06-14 NOTE — ASSESSMENT & PLAN NOTE
1.6 cm lesion on the left kidney worrisome for RCC  Apparently this was being followed by outpt providers for a while and she had an MRI 6/6 which showed concern for malignancy  Urology consultation -recommended continuing following up with outpatient urologist at [facility] for further workup and management

## 2025-06-15 LAB
ANION GAP SERPL CALCULATED.3IONS-SCNC: 6 MMOL/L (ref 4–13)
BASOPHILS # BLD AUTO: 0.05 THOUSANDS/ÂΜL (ref 0–0.1)
BASOPHILS NFR BLD AUTO: 1 % (ref 0–1)
BUN SERPL-MCNC: 21 MG/DL (ref 5–25)
CALCIUM SERPL-MCNC: 8.7 MG/DL (ref 8.4–10.2)
CHLORIDE SERPL-SCNC: 107 MMOL/L (ref 96–108)
CO2 SERPL-SCNC: 29 MMOL/L (ref 21–32)
CREAT SERPL-MCNC: 0.77 MG/DL (ref 0.6–1.3)
EOSINOPHIL # BLD AUTO: 0.23 THOUSAND/ÂΜL (ref 0–0.61)
EOSINOPHIL NFR BLD AUTO: 5 % (ref 0–6)
ERYTHROCYTE [DISTWIDTH] IN BLOOD BY AUTOMATED COUNT: 14.7 % (ref 11.6–15.1)
GFR SERPL CREATININE-BSD FRML MDRD: 72 ML/MIN/1.73SQ M
GLUCOSE SERPL-MCNC: 92 MG/DL (ref 65–140)
HCT VFR BLD AUTO: 33.7 % (ref 34.8–46.1)
HGB BLD-MCNC: 11.1 G/DL (ref 11.5–15.4)
IMM GRANULOCYTES # BLD AUTO: 0 THOUSAND/UL (ref 0–0.2)
IMM GRANULOCYTES NFR BLD AUTO: 0 % (ref 0–2)
LYMPHOCYTES # BLD AUTO: 1.65 THOUSANDS/ÂΜL (ref 0.6–4.47)
LYMPHOCYTES NFR BLD AUTO: 35 % (ref 14–44)
MAGNESIUM SERPL-MCNC: 1.7 MG/DL (ref 1.9–2.7)
MCH RBC QN AUTO: 31.1 PG (ref 26.8–34.3)
MCHC RBC AUTO-ENTMCNC: 32.9 G/DL (ref 31.4–37.4)
MCV RBC AUTO: 94 FL (ref 82–98)
MONOCYTES # BLD AUTO: 0.48 THOUSAND/ÂΜL (ref 0.17–1.22)
MONOCYTES NFR BLD AUTO: 10 % (ref 4–12)
NEUTROPHILS # BLD AUTO: 2.33 THOUSANDS/ÂΜL (ref 1.85–7.62)
NEUTS SEG NFR BLD AUTO: 49 % (ref 43–75)
NRBC BLD AUTO-RTO: 0 /100 WBCS
PLATELET # BLD AUTO: 213 THOUSANDS/UL (ref 149–390)
PMV BLD AUTO: 10.4 FL (ref 8.9–12.7)
POTASSIUM SERPL-SCNC: 3.5 MMOL/L (ref 3.5–5.3)
RBC # BLD AUTO: 3.57 MILLION/UL (ref 3.81–5.12)
SODIUM SERPL-SCNC: 142 MMOL/L (ref 135–147)
WBC # BLD AUTO: 4.74 THOUSAND/UL (ref 4.31–10.16)

## 2025-06-15 PROCEDURE — 80048 BASIC METABOLIC PNL TOTAL CA: CPT | Performed by: STUDENT IN AN ORGANIZED HEALTH CARE EDUCATION/TRAINING PROGRAM

## 2025-06-15 PROCEDURE — 85025 COMPLETE CBC W/AUTO DIFF WBC: CPT | Performed by: STUDENT IN AN ORGANIZED HEALTH CARE EDUCATION/TRAINING PROGRAM

## 2025-06-15 PROCEDURE — 83735 ASSAY OF MAGNESIUM: CPT | Performed by: STUDENT IN AN ORGANIZED HEALTH CARE EDUCATION/TRAINING PROGRAM

## 2025-06-15 PROCEDURE — 99232 SBSQ HOSP IP/OBS MODERATE 35: CPT | Performed by: STUDENT IN AN ORGANIZED HEALTH CARE EDUCATION/TRAINING PROGRAM

## 2025-06-15 RX ADMIN — Medication 1000 UNITS: at 09:39

## 2025-06-15 RX ADMIN — ENOXAPARIN SODIUM 40 MG: 40 INJECTION SUBCUTANEOUS at 09:39

## 2025-06-15 RX ADMIN — CARBAMAZEPINE 200 MG: 100 TABLET, EXTENDED RELEASE ORAL at 17:56

## 2025-06-15 RX ADMIN — CARBAMAZEPINE 200 MG: 100 TABLET, EXTENDED RELEASE ORAL at 09:40

## 2025-06-15 RX ADMIN — ASPIRIN 81 MG: 81 TABLET, COATED ORAL at 09:39

## 2025-06-15 RX ADMIN — VITAM B12 100 MCG: 100 TAB at 09:39

## 2025-06-15 RX ADMIN — CEFTRIAXONE SODIUM 1000 MG: 10 INJECTION, POWDER, FOR SOLUTION INTRAVENOUS at 06:27

## 2025-06-15 NOTE — PLAN OF CARE
Problem: INFECTION - ADULT  Goal: Absence or prevention of progression during hospitalization  Description: INTERVENTIONS:  - Assess and monitor for signs and symptoms of infection  - Monitor lab/diagnostic results  - Monitor all insertion sites, i.e. indwelling lines, tubes, and drains  - Monitor endotracheal if appropriate and nasal secretions for changes in amount and color  - [place] appropriate cooling/warming therapies per order  - Administer medications as ordered  - Instruct and encourage patient and family to use good hand hygiene technique  - Identify and instruct in appropriate isolation precautions for identified infection/condition  Outcome: Progressing     Problem: GENITOURINARY - ADULT  Goal: Maintains or returns to baseline urinary function  Description: INTERVENTIONS:  - Assess urinary function  - Encourage oral fluids to ensure adequate hydration if ordered  - Administer IV fluids as ordered to ensure adequate hydration  - Administer ordered medications as needed  - Offer frequent toileting  - Follow urinary retention protocol if ordered  Outcome: Progressing  Goal: Absence of urinary retention  Description: INTERVENTIONS:  - Assess patient’s ability to void and empty bladder  - Monitor I/O  - Bladder scan as needed  - Discuss with physician/AP medications to alleviate retention as needed  - Discuss catheterization for long term situations as appropriate  Outcome: Progressing  Goal: Urinary catheter remains patent  Description: INTERVENTIONS:  - Assess patency of urinary catheter  - If patient has a chronic grant, consider changing catheter if non-functioning  - Follow guidelines for intermittent irrigation of non-functioning urinary catheter  Outcome: Progressing

## 2025-06-15 NOTE — ASSESSMENT & PLAN NOTE
She was found to have acute urinary retention in the ER and Grant catheter was placed  CTAP showed possible cystitis, 1.6 cm lesion on the left kidney worrisome for RCC.  No evidence of hydronephrosis or nephrolithiasis  Will start on scheduled bowel regimen  Urology consultation for grant management  -they recommended discharging with Grant catheter however the patient feels uncomfortable with this.    Will try a voiding trial this morning. She had 50 cc of UOP after grant removed. Continue PRN bladder scans per protocol.   If retaining I did discuss with her that she would need to have the grant reinserted

## 2025-06-15 NOTE — ASSESSMENT & PLAN NOTE
Trop mildly elevated 24>116  Will get one more trop  No chest pain reported  She is back to baseline now after grant placement  Likely NIMI given hypertensive urgency on arrival given her pain  Monitored on tele for 24 hrs

## 2025-06-15 NOTE — PLAN OF CARE
Problem: INFECTION - ADULT  Goal: Absence or prevention of progression during hospitalization  Description: INTERVENTIONS:  - Assess and monitor for signs and symptoms of infection  - Monitor lab/diagnostic results  - Monitor all insertion sites, i.e. indwelling lines, tubes, and drains  - Monitor endotracheal if appropriate and nasal secretions for changes in amount and color  - [place] appropriate cooling/warming therapies per order  - Administer medications as ordered  - Instruct and encourage patient and family to use good hand hygiene technique  - Identify and instruct in appropriate isolation precautions for identified infection/condition  Outcome: Progressing     Problem: SAFETY ADULT  Goal: Patient will remain free of falls  Description: INTERVENTIONS:  - Educate patient/family on patient safety including physical limitations  - Instruct patient to call for assistance with activity   - Consider consulting OT/PT to assist with strengthening/mobility based on AM PAC & JH-HLM score  - Consult OT/PT to assist with strengthening/mobility   - Keep Call bell within reach  - Keep bed low and locked with side rails adjusted as appropriate  - Keep care items and personal belongings within reach  - Initiate and maintain comfort rounds  - Make Fall Risk Sign visible to staff  - Offer Toileting every two  Hours, in advance of need  - Initiate/Maintain bed alarm  - Obtain necessary fall risk management equipment: bed in lowest position, call bell within reach  - Apply yellow socks and bracelet for high fall risk patients  - Consider moving patient to room near nurses station  Outcome: Progressing

## 2025-06-15 NOTE — ASSESSMENT & PLAN NOTE
1.6 cm lesion on the left kidney worrisome for RCC  Apparently this was being followed by outpt providers for a while and she had an MRI 6/6 which showed concern for malignancy  Urology consultation -recommended continuing following up with outpatient urologist at [facility] for further workup and management

## 2025-06-15 NOTE — ASSESSMENT & PLAN NOTE
Recurrent issue for which she takes Keflex   Bladder on CT scan looked concerning for cystitis and she has urinary retention which could be from this versus her MS  Ceftriaxone was started and will be continued  Follow-up urine culture - no growth

## 2025-06-15 NOTE — PROGRESS NOTES
Progress Note - Hospitalist   Name: Francesca Retana 79 y.o. female I MRN: [id number]  Unit/Bed#: -01 I Date of Admission: 6/13/2025   Date of Service: 6/15/2025 I Hospital Day: 1    Assessment & Plan  Left renal mass  1.6 cm lesion on the left kidney worrisome for RCC  Apparently this was being followed by outpt providers for a while and she had an MRI 6/6 which showed concern for malignancy  Urology consultation -recommended continuing following up with outpatient urologist at [facility] for further workup and management  Multiple sclerosis (HCC)  History of multiple sclerosis.  She was taken off her medications at the time that she was diagnosed with lung cancer.  She has imbalance issues and decreased sensation from this   Trigeminal neuralgia  Continue carbamazepine  Urinary retention  She was found to have acute urinary retention in the ER and Grant catheter was placed  CTAP showed possible cystitis, 1.6 cm lesion on the left kidney worrisome for RCC.  No evidence of hydronephrosis or nephrolithiasis  Will start on scheduled bowel regimen  Urology consultation for grant management  -they recommended discharging with Grant catheter however the patient feels uncomfortable with this.    Will try a voiding trial this morning. She had 50 cc of UOP after grant removed. Continue PRN bladder scans per protocol.   If retaining I did discuss with her that she would need to have the grant reinserted   Cystitis  Recurrent issue for which she takes Keflex   Bladder on CT scan looked concerning for cystitis and she has urinary retention which could be from this versus her MS  Ceftriaxone was started and will be continued  Follow-up urine culture - no growth  Elevated troponin  Trop mildly elevated 24>116  Will get one more trop  No chest pain reported  She is back to baseline now after grant placement  Likely NIMI given hypertensive urgency on arrival given her pain  Monitored on tele for 24 hrs     VTE  Pharmacologic Prophylaxis:   Moderate Risk (Score 3-4) - Pharmacological DVT Prophylaxis Ordered: enoxaparin (Lovenox).    Mobility:   Basic Mobility Inpatient Raw Score: 21  JH-HLM Goal: 6: Walk 10 steps or more  JH-HLM Achieved: 6: Walk 10 steps or more  JH-HLM Goal achieved. Continue to encourage appropriate mobility.    Patient Centered Rounds: I performed bedside rounds with nursing staff today.   Discussions with Specialists or Other Care Team Provider: none    Education and Discussions with Family / Patient: Patient declined call to .     Current Length of Stay: 1 day(s)  Current Patient Status: Inpatient   Certification Statement: The patient will continue to require additional inpatient hospital stay due to PT evaluation, may need rehab  Discharge Plan: Anticipate discharge in 24-48 hrs to D based on pt evaluation     Code Status: Level 1 - Full Code    Subjective   Reported she wants the grant out and does not want to go home with it. She lives alone and ambulates with a walker so this will be difficult for her. Had multiple bowel movements after scheduled laxatives.    Objective :  Temp:  [97.7 °F (36.5 °C)-98.1 °F (36.7 °C)] 97.7 °F (36.5 °C)  HR:  [] 103  BP: (119-163)/(68-84) 163/84  SpO2:  [93 %-96 %] 95 %  O2 Device: None (Room air)    Body mass index is 23.19 kg/m².     Input and Output Summary (last 24 hours):     Intake/Output Summary (Last 24 hours) at 6/15/2025 1538  Last data filed at 6/15/2025 1233  Gross per 24 hour   Intake 480 ml   Output 550 ml   Net -70 ml       Physical Exam  Nad  Nonlabored resp   Rrr  Ntnd abd  [id number]    Lines/Drains:              Lab Results: I have reviewed the following results:   Results from last 7 days   Lab Units 06/15/25  0505   WBC Thousand/uL 4.74   HEMOGLOBIN g/dL 11.1*   HEMATOCRIT % 33.7*   PLATELETS Thousands/uL 213   SEGS PCT % 49   LYMPHO PCT % 35   MONO PCT % 10   EOS PCT % 5     Results from last 7 days   Lab Units 06/15/25  2633  06/13/25  0513   SODIUM mmol/L 142 142   POTASSIUM mmol/L 3.5 3.5   CHLORIDE mmol/L 107 105   CO2 mmol/L 29 29   BUN mg/dL 21 17   CREATININE mg/dL 0.77 0.91   ANION GAP mmol/L 6 8   CALCIUM mg/dL 8.7 9.2   ALBUMIN g/dL  --  3.9   TOTAL BILIRUBIN mg/dL  --  0.38   ALK PHOS U/L  --  99   ALT U/L  --  8   AST U/L  --  13   GLUCOSE RANDOM mg/dL 92 103                 Results from last 7 days   Lab Units 06/13/25  0513   LACTIC ACID mmol/L 1.7       Recent Cultures (last 7 days):   Results from last 7 days   Lab Units 06/13/25  0729 06/13/25  0726 06/13/25  0516   BLOOD CULTURE  No Growth at 48 hrs. No Growth at 48 hrs.  --    URINE CULTURE   --   --  No Growth <1000 cfu/mL       Imaging Results Review: No pertinent imaging studies reviewed.  Other Study Results Review: No additional pertinent studies reviewed.    Last 24 Hours Medication List:     Current Facility-Administered Medications:     acetaminophen (TYLENOL) tablet 650 mg, Q6H PRN    albuterol (PROVENTIL HFA,VENTOLIN HFA) inhaler 2 puff, Q6H PRN    aspirin (ECOTRIN LOW STRENGTH) EC tablet 81 mg, Daily    carBAMazepine (TEGretol XR) 12 hr tablet 200 mg, BID    cefTRIAXone (ROCEPHIN) 1,000 mg in dextrose 5 % 50 mL IVPB, Q24H, Last Rate: 1,000 mg (06/15/25 0627)    Cholecalciferol (VITAMIN D3) tablet 1,000 Units, Daily    cyanocobalamin (VITAMIN B-12) tablet 100 mcg, Daily    enoxaparin (LOVENOX) subcutaneous injection 40 mg, Daily    polyethylene glycol (MIRALAX) packet 17 g, Daily    senna (SENOKOT) tablet 8.6 mg, BID    Administrative Statements   Today, Patient Was Seen By: Rohit Guerra MD      **Please Note: This note may have been constructed using a voice recognition system.**

## 2025-06-16 VITALS
HEART RATE: 86 BPM | DIASTOLIC BLOOD PRESSURE: 72 MMHG | BODY MASS INDEX: 23.21 KG/M2 | HEIGHT: 65 IN | TEMPERATURE: 98 F | SYSTOLIC BLOOD PRESSURE: 134 MMHG | OXYGEN SATURATION: 93 % | WEIGHT: 139.33 LBS | RESPIRATION RATE: 16 BRPM

## 2025-06-16 LAB
ANION GAP SERPL CALCULATED.3IONS-SCNC: 5 MMOL/L (ref 4–13)
BASOPHILS # BLD AUTO: 0.05 THOUSANDS/ÂΜL (ref 0–0.1)
BASOPHILS NFR BLD AUTO: 1 % (ref 0–1)
BUN SERPL-MCNC: 18 MG/DL (ref 5–25)
CALCIUM SERPL-MCNC: 8.8 MG/DL (ref 8.4–10.2)
CHLORIDE SERPL-SCNC: 105 MMOL/L (ref 96–108)
CO2 SERPL-SCNC: 31 MMOL/L (ref 21–32)
CREAT SERPL-MCNC: 0.73 MG/DL (ref 0.6–1.3)
EOSINOPHIL # BLD AUTO: 0.2 THOUSAND/ÂΜL (ref 0–0.61)
EOSINOPHIL NFR BLD AUTO: 3 % (ref 0–6)
ERYTHROCYTE [DISTWIDTH] IN BLOOD BY AUTOMATED COUNT: 14.6 % (ref 11.6–15.1)
GFR SERPL CREATININE-BSD FRML MDRD: 77 ML/MIN/1.73SQ M
GLUCOSE SERPL-MCNC: 93 MG/DL (ref 65–140)
HCT VFR BLD AUTO: 34.2 % (ref 34.8–46.1)
HGB BLD-MCNC: 11.3 G/DL (ref 11.5–15.4)
IMM GRANULOCYTES # BLD AUTO: 0.01 THOUSAND/UL (ref 0–0.2)
IMM GRANULOCYTES NFR BLD AUTO: 0 % (ref 0–2)
LYMPHOCYTES # BLD AUTO: 1.2 THOUSANDS/ÂΜL (ref 0.6–4.47)
LYMPHOCYTES NFR BLD AUTO: 19 % (ref 14–44)
MAGNESIUM SERPL-MCNC: 1.6 MG/DL (ref 1.9–2.7)
MCH RBC QN AUTO: 31.3 PG (ref 26.8–34.3)
MCHC RBC AUTO-ENTMCNC: 33 G/DL (ref 31.4–37.4)
MCV RBC AUTO: 95 FL (ref 82–98)
MONOCYTES # BLD AUTO: 0.59 THOUSAND/ÂΜL (ref 0.17–1.22)
MONOCYTES NFR BLD AUTO: 9 % (ref 4–12)
NEUTROPHILS # BLD AUTO: 4.26 THOUSANDS/ÂΜL (ref 1.85–7.62)
NEUTS SEG NFR BLD AUTO: 68 % (ref 43–75)
NRBC BLD AUTO-RTO: 0 /100 WBCS
PLATELET # BLD AUTO: 208 THOUSANDS/UL (ref 149–390)
PMV BLD AUTO: 9.8 FL (ref 8.9–12.7)
POTASSIUM SERPL-SCNC: 3.9 MMOL/L (ref 3.5–5.3)
RBC # BLD AUTO: 3.61 MILLION/UL (ref 3.81–5.12)
SODIUM SERPL-SCNC: 141 MMOL/L (ref 135–147)
WBC # BLD AUTO: 6.31 THOUSAND/UL (ref 4.31–10.16)

## 2025-06-16 PROCEDURE — 85025 COMPLETE CBC W/AUTO DIFF WBC: CPT | Performed by: STUDENT IN AN ORGANIZED HEALTH CARE EDUCATION/TRAINING PROGRAM

## 2025-06-16 PROCEDURE — 80048 BASIC METABOLIC PNL TOTAL CA: CPT | Performed by: STUDENT IN AN ORGANIZED HEALTH CARE EDUCATION/TRAINING PROGRAM

## 2025-06-16 PROCEDURE — 97110 THERAPEUTIC EXERCISES: CPT

## 2025-06-16 PROCEDURE — 99239 HOSP IP/OBS DSCHRG MGMT >30: CPT | Performed by: STUDENT IN AN ORGANIZED HEALTH CARE EDUCATION/TRAINING PROGRAM

## 2025-06-16 PROCEDURE — 83735 ASSAY OF MAGNESIUM: CPT | Performed by: STUDENT IN AN ORGANIZED HEALTH CARE EDUCATION/TRAINING PROGRAM

## 2025-06-16 PROCEDURE — 97163 PT EVAL HIGH COMPLEX 45 MIN: CPT

## 2025-06-16 RX ORDER — CEFPODOXIME PROXETIL 200 MG/1
200 TABLET, FILM COATED ORAL 2 TIMES DAILY
Qty: 6 TABLET | Refills: 0 | Status: SHIPPED | OUTPATIENT
Start: 2025-06-16 | End: 2025-06-19

## 2025-06-16 RX ORDER — MAGNESIUM SULFATE HEPTAHYDRATE 40 MG/ML
2 INJECTION, SOLUTION INTRAVENOUS ONCE
Status: COMPLETED | OUTPATIENT
Start: 2025-06-16 | End: 2025-06-16

## 2025-06-16 RX ADMIN — MAGNESIUM SULFATE HEPTAHYDRATE 2 G: 40 INJECTION, SOLUTION INTRAVENOUS at 09:58

## 2025-06-16 RX ADMIN — VITAM B12 100 MCG: 100 TAB at 08:18

## 2025-06-16 RX ADMIN — CARBAMAZEPINE 200 MG: 100 TABLET, EXTENDED RELEASE ORAL at 08:18

## 2025-06-16 RX ADMIN — POLYETHYLENE GLYCOL 3350 17 G: 17 POWDER, FOR SOLUTION ORAL at 08:17

## 2025-06-16 RX ADMIN — CEFTRIAXONE SODIUM 1000 MG: 10 INJECTION, POWDER, FOR SOLUTION INTRAVENOUS at 06:05

## 2025-06-16 RX ADMIN — Medication 1000 UNITS: at 08:18

## 2025-06-16 RX ADMIN — ASPIRIN 81 MG: 81 TABLET, COATED ORAL at 08:18

## 2025-06-16 RX ADMIN — ENOXAPARIN SODIUM 40 MG: 40 INJECTION SUBCUTANEOUS at 08:18

## 2025-06-16 NOTE — PLAN OF CARE
Problem: GENITOURINARY - ADULT  Goal: Maintains or returns to baseline urinary function  Description: INTERVENTIONS:  - Assess urinary function  - Encourage oral fluids to ensure adequate hydration if ordered  - Administer IV fluids as ordered to ensure adequate hydration  - Administer ordered medications as needed  - Offer frequent toileting  - Follow urinary retention protocol if ordered  Outcome: Progressing       Problem: SAFETY ADULT  Goal: Maintains/Returns to pre admission functional level  Description: INTERVENTIONS:  - Perform AM-PAC 6 Click Basic Mobility/ Daily Activity assessment daily.  - Set and communicate daily mobility goal to care team and patient/family/caregiver.   - Collaborate with rehabilitation services on mobility goals if consulted  - Perform Range of Motion 2 times a day.  - Reposition patient every 2 hours.  - Dangle patient 2 times a day  - Stand patient 2 times a day  - Ambulate patient 2 times a day  - Out of bed to chair 2 times a day   - Out of bed for meals 2 times a day  - Out of bed for toileting  - Record patient progress and toleration of activity level   Outcome: Progressing

## 2025-06-16 NOTE — DISCHARGE INSTR - AVS FIRST PAGE
Please continue 3 more days of antibiotics which were sent to your pharmacy for treatment of your UTI.  Please make a follow-up appointment with your urologist for further workup and management of your kidney mass that was seen on CT and recent outpatient MRI.

## 2025-06-16 NOTE — NURSING NOTE
Patient given discharge instructions, aware of f/u appointments, aware of new scripts to be p/u at local pharmacy. Hep lock dc'ralph. Belongings with patient, taken to main lobby for discharge to home with son.

## 2025-06-16 NOTE — CASE MANAGEMENT
Case Management Assessment & Discharge Planning Note    Patient name Francesca Retana  Location /-01 MRN [id number]  : 1945 Date 2025       Current Admission Date: 2025  Current Admission Diagnosis:Urinary retention   Patient Active Problem List    Diagnosis Date Noted    Urinary retention 2025    Cystitis 2025    Elevated troponin 2025    Acute exacerbation of COPD with asthma (HCC) 2025    Acute respiratory failure with hypoxia (HCC) 2025    History of pulmonary embolism 2025    Lesion of left native kidney 2025    Sepsis without acute organ dysfunction (HCC) 10/16/2024    Bacteremia due to Proteus species 10/16/2024    Neurogenic bladder 10/16/2024    Left renal mass 10/15/2024    Multiple sclerosis (HCC) 10/15/2024    Trigeminal neuralgia 10/15/2024    History of lung cancer 10/15/2024      LOS (days): 2  Geometric Mean LOS (GMLOS) (days):   Days to GMLOS:     OBJECTIVE:    Risk of Unplanned Readmission Score: 13.2         Current admission status: Inpatient       Preferred Pharmacy:   [id number] [place] - [address]  [address]  [facility] [address]  Phone: [phone] Fax: [phone]    Primary Care Provider: Jazz Tripp MD    Primary Insurance: [employer] [facility]  Secondary Insurance:     ASSESSMENT:  Active Health Care Proxies    There are no active Health Care Proxies on file.       Advance Directives  Does patient have a Health Care POA?: No  Was patient offered paperwork?: Yes (declined-already has paperwork)  Does patient currently have a Health Care decision maker?: Yes, please see Health Care Proxy section  Does patient have Advance Directives?: No  Was patient offered paperwork?: Yes (declined-already has paperwork)  Primary Contact: daughter Lashanda Tracey         Readmission Root Cause  30 Day Readmission: No    Patient Information  Admitted from:: Home  Mental Status:  Alert  During Assessment patient was accompanied by: Not accompanied during assessment  Assessment information provided by:: Patient  Primary Caregiver: Self  Support Systems: Daughter  County of Residence: [place]  What city do you live in?: [place]  Home entry access options. Select all that apply.: Stairs  Number of steps to enter home.: 2  Do the steps have railings?: Yes  Type of Current Residence: [facility]  Living Arrangements: Lives Alone  Is patient a ?: No    Activities of Daily Living Prior to Admission  Functional Status: Independent  Completes ADLs independently?: Yes  Ambulates independently?: Yes  Does patient use assisted devices?: Yes  Assisted Devices (DME) used: Straight Cane, Rollator, Other (Comment) (electric scooter)  Does patient currently own DME?: Yes  What DME does the patient currently own?: Quad Cane, Straight Cane, Other (Comment) (electric scooter)  Does patient have a history of Outpatient Therapy (PT/OT)?: No  Does the patient have a history of Short-Term Rehab?: Yes  Does patient have a history of HHC?: Yes  Does patient currently have HHC?: No         Patient Information Continued  Income Source: Pension/assisted  Does patient have prescription coverage?: Yes  Can the patient afford their medications and any related supplies (such as glucometers or test strips)?: Yes  Does patient receive dialysis treatments?: No  Does patient have a history of substance abuse?: No  Does patient have a history of Mental Health Diagnosis?: No         Means of Transportation  Means of Transport to Appts:: Drives Self          DISCHARGE DETAILS:    Discharge planning discussed with:: patient  Freedom of Choice: Yes  Comments - Freedom of Choice: CM discussed d/c needs including HHC (pt has MS) but she declines this offer.  IPTA/Drives  CM contacted family/caregiver?: No- see comments (pt will update her daughter Lashanda herself)  Were Treatment Team discharge recommendations reviewed with  patient/caregiver?: Yes  Did patient/caregiver verbalize understanding of patient care needs?: Yes  Were patient/caregiver advised of the risks associated with not following Treatment Team discharge recommendations?: Yes    Contacts  Patient Contacts: Lashanda  Relationship to Patient:: Family    Requested Home Health Care         Is the patient interested in HHC at discharge?: No    DME Referral Provided  Referral made for DME?: No    Other Referral/Resources/Interventions Provided:  Interventions: None Indicated  Referral Comments: No anticipated d/c needs  Pt declining HHC    Would you like to participate in our [facility] service program?  : No - Declined    Treatment Team Recommendation: Home  Expected Discharge Disposition: Home or Self Care  Additional Discharge Dispositions: Home or Self Care  Transport at Discharge : Family

## 2025-06-16 NOTE — PHYSICAL THERAPY NOTE
"   Physical Therapy Evaluation     Patient's Name: Francesca Retana    Admitting Diagnosis  Urinary retention [R33.9]  Pancreatitis [K85.90]  UTI (urinary tract infection) [N39.0]  Abdominal pain [R10.9]  Left renal mass [N28.89]    Problem List  Problem List[1]  Past Medical History  Past Medical History[2]  Past Surgical History  Past Surgical History[3]       06/16/25 1019   PT Last Visit   PT Visit Date 06/16/25   Note Type   Note type Evaluation and Treatment   Pain Assessment   Pain Assessment Tool 0-10   Pain Score No Pain   Restrictions/Precautions   Weight Bearing Precautions Per Order No   Braces or Orthoses Other (Comment)  (none per patient)   Other Precautions Chair Alarm;Bed Alarm;Multiple lines;Fall Risk   Home Living   Type of Home House   Home Layout One level;Able to live on main level with bedroom/bathroom;Performs ADLs on one level;Stairs to enter with rails  (2 LUIS MANUEL)   Bathroom Shower/Tub Tub/shower unit   Bathroom Toilet Raised   Bathroom Equipment Shower chair;Grab bars in shower;Grab bars around toilet   Bathroom Accessibility Accessible   Home Equipment Cane;Walker;Electric scooter   Additional Comments Pt ambulates with a cane for household distances and uses an electric scooter in the community.   Prior Function   Level of [place] Independent with functional mobility;Independent with ADLs;Independent with IADLS   Lives With Alone   Receives Help From Friend(s)   IADLs Independent with driving;Independent with meal prep;Independent with medication management  (local driving)   Falls in the last 6 months 0   Vocational Retired   General   Family/Caregiver Present No   Cognition   Overall Cognitive Status WFL   Arousal/Participation Alert   Orientation Level Oriented X4   Memory Within functional limits   Following Commands Follows all commands and directions without difficulty   Comments Pt agreeable to PT.   Subjective   Subjective \"I have been up, but it's been a little more " "difficult.\"   RLE Assessment   RLE Assessment X   Strength RLE   R Hip Flexion 4-/5   R Knee Extension 3+/5   R Ankle Dorsiflexion 4-/5   LLE Assessment   LLE Assessment X   Strength LLE   L Hip Flexion 4-/5   L Knee Extension 4-/5   L Ankle Dorsiflexion 4-/5   Light Touch   RLE Light Touch Impaired   LLE Light Touch Impaired   Bed Mobility   Supine to Sit 6  Modified independent   Additional items HOB elevated;Bedrails;Increased time required;Verbal cues   Transfers   Sit to Stand 4  Minimal assistance   Additional items Assist x 1;Increased time required;Verbal cues   Stand to Sit 4  Minimal assistance   Additional items Assist x 1;Armrests;Increased time required;Verbal cues   Ambulation/Elevation   Gait pattern R Knee Zafar;Improper Weight shift;Decreased foot clearance;Decreased hip extension;Decreased heel strike;Decreased toe off;Knees flexed;Short stride   Gait Assistance 4  Minimal assist   Additional items Assist x 1;Verbal cues;Tactile cues   Assistive Device Rolling walker   Distance 50'  (standing rest break after initial 25')   Balance   Static Sitting Fair +   Dynamic Sitting Fair   Static Standing Fair -   Dynamic Standing Poor +   Ambulatory Poor +   Endurance Deficit   Endurance Deficit Yes   Endurance Deficit Description Pt limited secondary to fatigue   Activity Tolerance   Activity Tolerance Patient tolerated treatment well;Patient limited by fatigue   Medical Staff Made Aware PT Malinda   Nurse Made Aware RN Meggan   Assessment   Prognosis Good   Problem List Decreased strength;Decreased endurance;Impaired balance;Decreased mobility;Impaired sensation   Assessment Pt evaluated by PT secondary to admission to [facility] due to urinary retention. Co-morbidities affecting pt at this time include left renal mass, multiple sclerosis, trigeminal neuralgia, cystitis, and elevated troponin. Pt lives alone in a one level home with 2 steps with railing to enter. Pt was previously " independent in all ADLs and functional mobility, and ambulates in home with cane and uses an electric scooter in the community at baseline. Body systems were assessed as indicated, refer to flowsheet for values. Pt performed supine to sit at a modified independent level, using HOB elevated as well as bed rails. Pt performed sit to stand and stand to sit transfers with min A x 1. Pt ambulated 50' with standing rest break after initial 25' with min A x 1 and RW. During ambulation pt displayed one episode of buckling of R knee and required tactile cues to maintain COM over CATHREINE as pt demonstrated slight posterior and R lateral lean. Pt presents with the following impairments: decreased strength, decreased endurance, impaired balance, and decreased mobility. These impairments limit pt's ability to perform dynamic household activities, ambulate household distances with a cane, negotiate uneven surfaces, and navigate elevations. PT to recommend level 2 moderate resource intensity to address above listed deficits and return to PLOF. Pt to benefit from PT.   Barriers to Discharge Inaccessible home environment;Decreased caregiver support;Other (Comment)  (Decline in functional mobility)   Goals   STG Expiration Date 06/26/25   Short Term Goal #1 In 10 days: Pt will improve bilateral LE strength by 1/2 MMT grade in order to improve ability to independently perform transfers. Pt will improve dynamic standing balance from poor+ to fair in order to perform dynamic household activities. Pt will perform transfers at an independent level in order to demonstrate ability to perform functional mobility independently. Pt will ambulate 65' with cane independently in order to ambulate household distances. Pt will be able to navigate 2 steps independently in order to be able to enter home.   PT Treatment Day 1   Plan   Treatment/Interventions Functional transfer training;LE strengthening/ROM;Elevations;Therapeutic exercise;Endurance  training;Equipment eval/education;Gait training;Bed mobility;Spoke to nursing   PT Frequency 3-5x/wk   Discharge Recommendation   Rehab Resource Intensity Level, PT II (Moderate Resource Intensity)   AM-PAC Basic Mobility Inpatient   Turning in Flat Bed Without Bedrails 3   Lying on Back to Sitting on Edge of Flat Bed Without Bedrails 3   Moving Bed to Chair 3   Standing Up From Chair Using Arms 3   Walk in Room 3   Climb 3-5 Stairs With Railing 1   Basic Mobility Inpatient Raw Score 16   Basic Mobility Standardized Score 38.32   [facility] Highest Level Of Mobility   -HL Goal 5: Stand one or more mins   -HL Achieved 7: Walk 25 feet or more   Additional Treatment Session   Start Time 1034   End Time 1044   Treatment Assessment Pt agreeable to PT treatment after initial evaluation. Pt participated in therapeutic exercise focused on LE strengthening. Pt responded well to treatment, requiring one rest break after initial two exercises due to fatigue. Pt continues to present with the following impairments: decreased strength, decreased endurance, impaired balance, and decreased mobility. These impairments limit pt's ability to perform dynamic household activities, ambulate household distances, negotiate uneven surfaces, and navigate elevations. PT to continue to recommend level 2 moderate resource intensity to address above listed impairments and return to PLOF. Pt to continue to benefit from PT.   Exercises   Quad Sets Sitting;10 reps;AROM;Bilateral  (long sitting)   Hip Flexion Sitting;20 reps;AROM;Bilateral   Knee AROM Long Arc Quad Sitting;20 reps;AROM;Bilateral   Ankle Pumps Sitting;20 reps;AROM;Bilateral   End of Consult   Patient Position at End of Consult Bedside chair;Bed/Chair alarm activated;All needs within reach       PT Evaluation Time: 8105-7983  PT Treatment Time: 0231-6640  10 minutes    Jazmyne Michaud, SPT         [1]   Patient Active Problem List  Diagnosis    Left renal mass    Multiple  sclerosis (HCC)    Trigeminal neuralgia    History of lung cancer    Sepsis without acute organ dysfunction (HCC)    Bacteremia due to Proteus species    Neurogenic bladder    Acute respiratory failure with hypoxia (HCC)    History of pulmonary embolism    Lesion of left native kidney    Acute exacerbation of COPD with asthma (HCC)    Urinary retention    Cystitis    Elevated troponin   [2]   Past Medical History:  Diagnosis Date    Cancer (HCC)     only has the left lung     MS (multiple sclerosis) (HCC)     Neurogenic bladder     Trigeminal neuralgia    [3]   Past Surgical History:  Procedure Laterality Date    LUNG REMOVAL, TOTAL      right

## 2025-06-16 NOTE — PLAN OF CARE
Problem: PHYSICAL THERAPY ADULT  Goal: Performs mobility at highest level of function for planned discharge setting.  See evaluation for individualized goals.  Description: Treatment/Interventions: Functional transfer training, LE strengthening/ROM, Elevations, Therapeutic exercise, Endurance training, Equipment eval/education, Gait training, Bed mobility, Spoke to nursing          See flowsheet documentation for full assessment, interventions and recommendations.  Note: Prognosis: Good  Problem List: Decreased strength, Decreased endurance, Impaired balance, Decreased mobility, Impaired sensation  Assessment: Pt evaluated by PT secondary to admission to [facility] due to urinary retention. Co-morbidities affecting pt at this time include left renal mass, multiple sclerosis, trigeminal neuralgia, cystitis, and elevated troponin. Pt lives alone in a one level home with 2 steps with railing to enter. Pt was previously independent in all ADLs and functional mobility, and ambulates in home with cane and uses an electric scooter in the community at baseline. Body systems were assessed as indicated, refer to flowsheet for values. Pt performed supine to sit at a modified independent level, using HOB elevated as well as bed rails. Pt performed sit to stand and stand to sit transfers with min A x 1. Pt ambulated 50' with standing rest break after initial 25' with min A x 1 and RW. During ambulation pt displayed one episode of buckling of R knee and required tactile cues to maintain COM over CATHERINE as pt demonstrated slight posterior and R lateral lean. Pt presents with the following impairments: decreased strength, decreased endurance, impaired balance, and decreased mobility. These impairments limit pt's ability to perform dynamic household activities, ambulate household distances with a cane, negotiate uneven surfaces, and navigate elevations. PT to recommend level 2 moderate resource intensity to address  above listed deficits and return to PLOF. Pt to benefit from PT.  Barriers to Discharge: Inaccessible home environment, Decreased caregiver support, Other (Comment) (Decline in functional mobility)     Rehab Resource Intensity Level, PT: II (Moderate Resource Intensity)    See flowsheet documentation for full assessment.

## 2025-06-18 LAB
BACTERIA BLD CULT: NORMAL
BACTERIA BLD CULT: NORMAL

## 2025-06-19 NOTE — ASSESSMENT & PLAN NOTE
Trop mildly elevated 24>116  Will get one more trop  No chest pain reported  She is back to baseline now after grant placement  Likely NIMI given hypertensive urgency on arrival given her pain  Monitored on tele for 24 hrs without recurrence

## 2025-06-19 NOTE — ASSESSMENT & PLAN NOTE
1.6 cm lesion on the left kidney worrisome for RCC  Apparently this was being followed by outpt providers for a while and she had an MRI 6/6 which showed concern for malignancy  Urology consultation -recommended continuing following up with outpatient urologist at [facility] for further workup and management

## 2025-06-19 NOTE — ASSESSMENT & PLAN NOTE
Recurrent issue for which she takes Keflex   Bladder on CT scan looked concerning for cystitis and she has urinary retention which could be from this versus her MS  Ceftriaxone was started and will be continued on dc to complete course for possible cystitis   Follow-up urine culture - no growth

## 2025-06-19 NOTE — ASSESSMENT & PLAN NOTE
She was found to have acute urinary retention in the ER and Grant catheter was placed  CTAP showed possible cystitis, 1.6 cm lesion on the left kidney worrisome for RCC.  No evidence of hydronephrosis or nephrolithiasis  Will start on scheduled bowel regimen  Urology consultation for grant management  -they recommended discharging with Grant catheter however the patient feels uncomfortable with this.    Will try a voiding trial this morning. She had 50 cc of UOP after grant removed. Continue PRN bladder scans per protocol.   She passed voiding trial and was dc'd home without grant. Close interval urology follow up was recommended.

## 2025-06-19 NOTE — DISCHARGE SUMMARY
Discharge Summary - Hospitalist   Name: Francesca Retana 79 y.o. female I MRN: [id number]  Unit/Bed#: -01 I Date of Admission: 6/13/2025   Date of Service: 6/19/2025 I Hospital Day: 2     Assessment & Plan  Left renal mass  1.6 cm lesion on the left kidney worrisome for RCC  Apparently this was being followed by outpt providers for a while and she had an MRI 6/6 which showed concern for malignancy  Urology consultation -recommended continuing following up with outpatient urologist at [facility] for further workup and management  Multiple sclerosis (HCC)  History of multiple sclerosis.  She was taken off her medications at the time that she was diagnosed with lung cancer.  She has imbalance issues and decreased sensation from this   Trigeminal neuralgia  Continue carbamazepine  Urinary retention  She was found to have acute urinary retention in the ER and Grant catheter was placed  CTAP showed possible cystitis, 1.6 cm lesion on the left kidney worrisome for RCC.  No evidence of hydronephrosis or nephrolithiasis  Will start on scheduled bowel regimen  Urology consultation for grant management  -they recommended discharging with Grant catheter however the patient feels uncomfortable with this.    Will try a voiding trial this morning. She had 50 cc of UOP after grant removed. Continue PRN bladder scans per protocol.   She passed voiding trial and was dc'd home without grant. Close interval urology follow up was recommended.    Cystitis  Recurrent issue for which she takes Keflex   Bladder on CT scan looked concerning for cystitis and she has urinary retention which could be from this versus her MS  Ceftriaxone was started and will be continued on dc to complete course for possible cystitis   Follow-up urine culture - no growth  Elevated troponin  Trop mildly elevated 24>116  Will get one more trop  No chest pain reported  She is back to baseline now after grant placement  Likely NIMI given hypertensive urgency  on arrival given her pain  Monitored on tele for 24 hrs without recurrence      Medical Problems       Resolved Problems  Date Reviewed: 1/25/2025   None       Discharging Physician / Practitioner: Rohit Guerra MD  PCP: Jazz Tripp MD  Admission Date:   Admission Orders (From admission, onward)       Ordered        06/14/25 1030  INPATIENT ADMISSION  Once            06/13/25 0735  Place in Observation  Once                          Discharge Date: 06/16/25        Consultations During Hospital Stay:  Urology     Procedures Performed:   Grant insertion and removal     Significant Findings / Test Results:   CT - renal mass       Hospital Course:   Francesca Retana is a 79 y.o. female patient who originally presented to the hospital on 6/13/2025 due to urinary retention for which she received grant catheter placement and was found to have L renal mass c/f malignancy. She was following with Urology at [facility] for this and had recent MRI. She received abx and bowel regimen and grant removal with successful voiding trial. She was seen by Urology here and recommended close follow up with o/p Urology at  for management of renal mass.     No notes on file      Please see above list of diagnoses and related plan for additional information.     Discharge Day Visit / Exam:   * Please refer to separate progress note for these details *    Discussion with Family: Patient declined call to .     Discharge instructions/Information to patient and family:   See after visit summary for information provided to patient and family.      Provisions for Follow-Up Care:  See after visit summary for information related to follow-up care and any pertinent home health orders.      Mobility at time of Discharge:   Basic Mobility Inpatient Raw Score: 16  JH-HLM Goal: 5: Stand one or more mins  JH-HLM Achieved: 7: Walk 25 feet or more  HLM Goal achieved. Continue to encourage appropriate mobility.     Disposition:   Home    Planned  Readmission: none    Administrative Statements   Discharge Statement:  I have spent a total time of 40+ minutes in caring for this patient on the day of the visit/encounter.    **Please Note: This note may have been constructed using a voice recognition system**

## 2025-07-05 ENCOUNTER — HOSPITAL ENCOUNTER (EMERGENCY)
Facility: HOSPITAL | Age: 80
Discharge: HOME | End: 2025-07-05
Attending: EMERGENCY MEDICINE | Admitting: EMERGENCY MEDICINE
Payer: COMMERCIAL

## 2025-07-05 ENCOUNTER — APPOINTMENT (EMERGENCY)
Dept: RADIOLOGY | Facility: HOSPITAL | Age: 80
End: 2025-07-05
Payer: COMMERCIAL

## 2025-07-05 VITALS
SYSTOLIC BLOOD PRESSURE: 134 MMHG | HEART RATE: 84 BPM | RESPIRATION RATE: 16 BRPM | DIASTOLIC BLOOD PRESSURE: 69 MMHG | OXYGEN SATURATION: 95 % | TEMPERATURE: 98.2 F

## 2025-07-05 DIAGNOSIS — R07.9 CHEST PAIN, UNSPECIFIED: Primary | ICD-10-CM

## 2025-07-05 LAB
ALBUMIN SERPL BCG-MCNC: 4.2 G/DL (ref 3.5–5)
ALP SERPL-CCNC: 96 U/L (ref 34–104)
ALT SERPL W P-5'-P-CCNC: 8 U/L (ref 7–52)
ANION GAP SERPL CALCULATED.3IONS-SCNC: 8 MMOL/L (ref 4–13)
AST SERPL W P-5'-P-CCNC: 15 U/L (ref 13–39)
BASOPHILS # BLD AUTO: 0.06 THOUSANDS/ÂΜL (ref 0–0.1)
BASOPHILS NFR BLD AUTO: 1 % (ref 0–1)
BILIRUB SERPL-MCNC: 0.33 MG/DL (ref 0.2–1)
BUN SERPL-MCNC: 18 MG/DL (ref 5–25)
CALCIUM SERPL-MCNC: 9.2 MG/DL (ref 8.4–10.2)
CARDIAC TROPONIN I PNL SERPL HS: 5 NG/L (ref ?–50)
CHLORIDE SERPL-SCNC: 102 MMOL/L (ref 96–108)
CO2 SERPL-SCNC: 29 MMOL/L (ref 21–32)
CREAT SERPL-MCNC: 0.86 MG/DL (ref 0.6–1.3)
EOSINOPHIL # BLD AUTO: 0.15 THOUSAND/ÂΜL (ref 0–0.61)
EOSINOPHIL NFR BLD AUTO: 3 % (ref 0–6)
ERYTHROCYTE [DISTWIDTH] IN BLOOD BY AUTOMATED COUNT: 14.7 % (ref 11.6–15.1)
GFR SERPL CREATININE-BSD FRML MDRD: 63 ML/MIN/1.73SQ M
GLUCOSE SERPL-MCNC: 111 MG/DL (ref 65–140)
HCT VFR BLD AUTO: 40 % (ref 34.8–46.1)
HGB BLD-MCNC: 12.8 G/DL (ref 11.5–15.4)
IMM GRANULOCYTES # BLD AUTO: 0.01 THOUSAND/UL (ref 0–0.2)
IMM GRANULOCYTES NFR BLD AUTO: 0 % (ref 0–2)
LIPASE SERPL-CCNC: 61 U/L (ref 11–82)
LYMPHOCYTES # BLD AUTO: 1.38 THOUSANDS/ÂΜL (ref 0.6–4.47)
LYMPHOCYTES NFR BLD AUTO: 27 % (ref 14–44)
MCH RBC QN AUTO: 30.9 PG (ref 26.8–34.3)
MCHC RBC AUTO-ENTMCNC: 32 G/DL (ref 31.4–37.4)
MCV RBC AUTO: 97 FL (ref 82–98)
MONOCYTES # BLD AUTO: 0.56 THOUSAND/ÂΜL (ref 0.17–1.22)
MONOCYTES NFR BLD AUTO: 11 % (ref 4–12)
NEUTROPHILS # BLD AUTO: 2.91 THOUSANDS/ÂΜL (ref 1.85–7.62)
NEUTS SEG NFR BLD AUTO: 58 % (ref 43–75)
NRBC BLD AUTO-RTO: 0 /100 WBCS
PLATELET # BLD AUTO: 255 THOUSANDS/UL (ref 149–390)
PMV BLD AUTO: 9.9 FL (ref 8.9–12.7)
POTASSIUM SERPL-SCNC: 3.6 MMOL/L (ref 3.5–5.3)
PROT SERPL-MCNC: 7.1 G/DL (ref 6.4–8.4)
RBC # BLD AUTO: 4.14 MILLION/UL (ref 3.81–5.12)
SODIUM SERPL-SCNC: 139 MMOL/L (ref 135–147)
WBC # BLD AUTO: 5.07 THOUSAND/UL (ref 4.31–10.16)

## 2025-07-05 PROCEDURE — 99285 EMERGENCY DEPT VISIT HI MDM: CPT

## 2025-07-05 PROCEDURE — 71045 X-RAY EXAM CHEST 1 VIEW: CPT

## 2025-07-05 PROCEDURE — 99285 EMERGENCY DEPT VISIT HI MDM: CPT | Performed by: EMERGENCY MEDICINE

## 2025-07-05 PROCEDURE — 80053 COMPREHEN METABOLIC PANEL: CPT | Performed by: EMERGENCY MEDICINE

## 2025-07-05 PROCEDURE — 84484 ASSAY OF TROPONIN QUANT: CPT | Performed by: EMERGENCY MEDICINE

## 2025-07-05 PROCEDURE — 83690 ASSAY OF LIPASE: CPT | Performed by: EMERGENCY MEDICINE

## 2025-07-05 PROCEDURE — 36415 COLL VENOUS BLD VENIPUNCTURE: CPT | Performed by: EMERGENCY MEDICINE

## 2025-07-05 PROCEDURE — 85025 COMPLETE CBC W/AUTO DIFF WBC: CPT | Performed by: EMERGENCY MEDICINE

## 2025-07-05 PROCEDURE — 93005 ELECTROCARDIOGRAM TRACING: CPT

## 2025-07-05 NOTE — ED PROVIDER NOTES
Time reflects when diagnosis was documented in both MDM as applicable and the Disposition within this note       Time User Action Codes Description Comment    7/5/2025  2:43 PM Lori Wood Add [R07.9] Chest pain, unspecified           ED Disposition       ED Disposition   Discharge    Condition   Stable    Date/Time   Sat Jul 5, 2025  2:43 PM    Comment   Francesca Retana discharge to home/self care.                   Assessment & Plan       Medical Decision Making  79 y.o. female presents for evaluation after an episode of chest pain last night. On exam, patient with normal vitals, in no acute distress.  Lungs are clear to auscultation bilaterally.  Abdomen is soft, nondistended, nontender.  Differential diagnosis includes, but is not limited to, ACS, heartburn, gastritis, pancreatitis, acute gallbladder dysfunction, or other acute pathology. Will evaluate with CBC, CMP, lipase, EKG, troponin, and chest x-ray.     Amount and/or Complexity of Data Reviewed  Labs: ordered. Decision-making details documented in ED Course.  Radiology: ordered and independent interpretation performed.        ED Course as of 07/05/25 1445   Sat Jul 05, 2025   1334 Procedure Note: EKG  Date/Time: 07/05/25 1:34 PM   Interpreted by: Lori Wood D.O.  Indications / Diagnosis: CP  ECG reviewed by me, the ED Provider: yes   The EKG demonstrates:  Rhythm: normal sinus  Intervals: normal intervals  Axis: normal axis  QRS/Blocks: normal QRS  ST Changes: No acute ST Changes, no STD/LUIS MANUEL.   1405 CBC and differential  Unremarkable.    1427 hs TnI 0hr: 5   1427 LIPASE: 61   1427 Comprehensive metabolic panel  Unremarkable.    1444 Patient's workup unremarkable overall.  At this time, given that patient is pain-free, and given normal cardiac workup, do not suspect cardiac cause of symptoms.  Will encourage patient to follow-up with her PCP for further evaluation.  Patient stable for discharge at this time.       Medications - No data to display    ED  Risk Strat Scores   HEART Risk Score      Flowsheet Row Most Recent Value   Heart Score Risk Calculator    History 0 Filed at: 07/05/2025 1443   ECG 0 Filed at: 07/05/2025 1443   Age 2 Filed at: 07/05/2025 1443   Risk Factors 1 Filed at: 07/05/2025 1443   Troponin 0 Filed at: 07/05/2025 1443   HEART Score 3 Filed at: 07/05/2025 1443          HEART Risk Score      Flowsheet Row Most Recent Value   Heart Score Risk Calculator    History 0 Filed at: 07/05/2025 1443   ECG 0 Filed at: 07/05/2025 1443   Age 2 Filed at: 07/05/2025 1443   Risk Factors 1 Filed at: 07/05/2025 1443   Troponin 0 Filed at: 07/05/2025 1443   HEART Score 3 Filed at: 07/05/2025 1443                      No data recorded        SBIRT 20yo+      Flowsheet Row Most Recent Value   Initial Alcohol Screen: US AUDIT-C     1. How often do you have a drink containing alcohol? 0 Filed at: 07/05/2025 1331   2. How many drinks containing alcohol do you have on a typical day you are drinking?  0 Filed at: 07/05/2025 1331   3b. FEMALE Any Age, or MALE 65+: How often do you have 4 or more drinks on one occassion? 0 Filed at: 07/05/2025 1331   Audit-C Score 0 Filed at: 07/05/2025 1331   LIA: How many times in the past year have you...    Used an illegal drug or used a prescription medication for non-medical reasons? Never Filed at: 07/05/2025 1331                            History of Present Illness       Chief Complaint   Patient presents with    Chest Pain     Patient arrived to ER c/o chest pain that started 01:30 AM today. Denies SOB Denies Dizziness Denies N/V/D 0/10 pain        Past Medical History[1]   Past Surgical History[2]   Family History[3]   Social History[4]   E-Cigarette/Vaping    E-Cigarette Use Never User       E-Cigarette/Vaping Substances    Nicotine No     THC No     CBD No TINCTURE    Flavoring No     Other No     Unknown No       I have reviewed and agree with the history as documented.     Patient is a 79 y.o. female who presents for  evaluation after an episode of chest pain this morning. Patient reports that symptoms began around 1:30 this morning when she got up to use the restroom.  She reports a burning sensation in the midsternal chest area.  She denies any associated shortness of breath, nausea, vomiting, or back pain.  She states that the pain has since subsided.  She states that she does sometimes get heartburn, but this felt different.  Denies any fevers, chills, chest pain, shortness of breath, abdominal pain, nausea, vomiting, or other concerning symptoms.         Review of Systems   Constitutional:  Negative for fever.   Respiratory:  Negative for shortness of breath.    Cardiovascular:  Positive for chest pain.   Gastrointestinal:  Negative for abdominal pain, nausea and vomiting.   Musculoskeletal:  Negative for back pain.   All other systems reviewed and are negative.          Objective       ED Triage Vitals [07/05/25 1329]   Temperature Pulse Blood Pressure Respirations SpO2 Patient Position - Orthostatic VS   98.2 °F (36.8 °C) 91 144/78 16 99 % Sitting      Temp Source Heart Rate Source BP Location FiO2 (%) Pain Score    Temporal Monitor Left arm -- --      Vitals      Date and Time Temp Pulse SpO2 Resp BP Pain Score FACES Pain Rating User   07/05/25 1330 -- -- 99 % -- -- -- -- CC   07/05/25 1329 98.2 °F (36.8 °C) 91 99 % 16 144/78 -- -- FB            Physical Exam  Vitals and nursing note reviewed.   Constitutional:       General: She is awake. She is not in acute distress.     Appearance: She is not toxic-appearing.   HENT:      Head: Normocephalic and atraumatic.     Eyes:      General: Vision grossly intact. Gaze aligned appropriately.       Cardiovascular:      Rate and Rhythm: Normal rate and regular rhythm.      Heart sounds: Normal heart sounds.   Pulmonary:      Effort: Pulmonary effort is normal. No respiratory distress.      Breath sounds: Normal breath sounds.   Abdominal:      General: There is no distension.       Palpations: Abdomen is soft.      Tenderness: There is no abdominal tenderness.     Musculoskeletal:      Cervical back: Full passive range of motion without pain and neck supple.     Skin:     General: Skin is warm and dry.     Neurological:      General: No focal deficit present.      Mental Status: She is alert and oriented to person, place, and time.         Results Reviewed       Procedure Component Value Units Date/Time    HS Troponin 0hr (reflex protocol) [[id number]]  (Normal) Collected: 07/05/25 1358    Lab Status: Final result Specimen: Blood from Arm, Right Updated: 07/05/25 1426     hs TnI 0hr 5 ng/L     HS Troponin I 2hr [[id number]]     Lab Status: No result Specimen: Blood     Comprehensive metabolic panel [[id number]] Collected: 07/05/25 1358    Lab Status: Final result Specimen: Blood from Arm, Right Updated: 07/05/25 1422     Sodium 139 mmol/L      Potassium 3.6 mmol/L      Chloride 102 mmol/L      CO2 29 mmol/L      ANION GAP 8 mmol/L      BUN 18 mg/dL      Creatinine 0.86 mg/dL      Glucose 111 mg/dL      Calcium 9.2 mg/dL      AST 15 U/L      ALT 8 U/L      Alkaline Phosphatase 96 U/L      Total Protein 7.1 g/dL      Albumin 4.2 g/dL      Total Bilirubin 0.33 mg/dL      eGFR 63 ml/min/1.73sq m     Narrative:      [employer] guidelines for Chronic Kidney Disease (CKD):     Stage 1 with normal or high GFR (GFR > 90 mL/min/1.73 square meters)    Stage 2 Mild CKD (GFR = 60-89 mL/min/1.73 square meters)    Stage 3A Moderate CKD (GFR = 45-59 mL/min/1.73 square meters)    Stage 3B Moderate CKD (GFR = 30-44 mL/min/1.73 square meters)    Stage 4 Severe CKD (GFR = 15-29 mL/min/1.73 square meters)    Stage 5 End Stage CKD (GFR <15 mL/min/1.73 square meters)  Note: GFR calculation is accurate only with a steady state creatinine    Lipase [[id number]]  (Normal) Collected: 07/05/25 1358    Lab Status: Final result Specimen: Blood from Arm, Right Updated: 07/05/25 1422     Lipase 61 u/L      CBC and differential [[id number]] Collected: 07/05/25 9148    Lab Status: Final result Specimen: Blood from Arm, Right Updated: 07/05/25 1403     WBC 5.07 Thousand/uL      RBC 4.14 Million/uL      Hemoglobin 12.8 g/dL      Hematocrit 40.0 %      MCV 97 fL      MCH 30.9 pg      MCHC 32.0 g/dL      RDW 14.7 %      MPV 9.9 fL      Platelets 255 Thousands/uL      nRBC 0 /100 WBCs      Segmented % 58 %      Immature Grans % 0 %      Lymphocytes % 27 %      Monocytes % 11 %      Eosinophils Relative 3 %      Basophils Relative 1 %      Absolute Neutrophils 2.91 Thousands/µL      Absolute Immature Grans 0.01 Thousand/uL      Absolute Lymphocytes 1.38 Thousands/µL      Absolute Monocytes 0.56 Thousand/µL      Eosinophils Absolute 0.15 Thousand/µL      Basophils Absolute 0.06 Thousands/µL             XR chest 1 view portable   ED Interpretation by Lori Wood DO (07/05 1406)   Unchanged when compared to previous, no acute abnormalities.          Procedures    ED Medication and Procedure Management   Prior to Admission Medications   Prescriptions Last Dose Informant Patient Reported? Taking?   Cholecalciferol (VITAMIN D3) 1,000 units tablet   Yes No   Sig: Take 1,000 Units by mouth in the morning.   albuterol (PROVENTIL HFA,VENTOLIN HFA) 90 mcg/act inhaler  Self Yes No   Sig: Inhale 2 puffs every 6 (six) hours as needed for shortness of breath or wheezing   aspirin (ECOTRIN LOW STRENGTH) 81 mg EC tablet   Yes No   Sig: Take 81 mg by mouth in the morning.   carBAMazepine (TEGretol XR) 200 mg 12 hr tablet   Yes No   Sig: Take 200 mg by mouth in the morning and 200 mg in the evening.   cyanocobalamin (VITAMIN B-12) 100 mcg tablet   Yes No   Sig: Take 100 mcg by mouth in the morning.   estradiol (ESTRACE VAGINAL) 0.1 mg/g vaginal cream  Self No No   Sig: Apply pea sized amount around urethra and inside vaginal opening 3 times per week   ipratropium (Atrovent HFA) 17 mcg/act inhaler   No No   Sig: Inhale 2 puffs 2 (two) times  a day   Patient not taking: Reported on 1/9/2025   triamcinolone (KENALOG) 0.1 % ointment  Self Yes No      Facility-Administered Medications: None     Patient's Medications   Discharge Prescriptions    No medications on file     No discharge procedures on file.  ED SEPSIS DOCUMENTATION   Time reflects when diagnosis was documented in both MDM as applicable and the Disposition within this note       Time User Action Codes Description Comment    7/5/2025  2:43 PM Lori Wood [R07.9] Chest pain, unspecified                    [1]   Past Medical History:  Diagnosis Date    Cancer (HCC)     only has the left lung     MS (multiple sclerosis) (HCC)     Neurogenic bladder     Trigeminal neuralgia    [2]   Past Surgical History:  Procedure Laterality Date    LUNG REMOVAL, TOTAL      right    [3]   Family History  Problem Relation Name Age of Onset    Cirrhosis Father      No Known Problems Mother      No Known Problems Daughter      No Known Problems Daughter     [4]   Social History  Tobacco Use    Smoking status: Former     Passive exposure: Past (as a teenager growing up)    Smokeless tobacco: Never   Vaping Use    Vaping status: Never Used   Substance Use Topics    Alcohol use: Yes     Comment: Occ.    Drug use: Never        Lori Wood DO  07/05/25 0463

## 2025-07-06 LAB
ATRIAL RATE: 87 BPM
P AXIS: 57 DEGREES
PR INTERVAL: 164 MS
QRS AXIS: 42 DEGREES
QRSD INTERVAL: 78 MS
QT INTERVAL: 350 MS
QTC INTERVAL: 421 MS
T WAVE AXIS: 105 DEGREES
VENTRICULAR RATE: 87 BPM

## 2025-07-06 PROCEDURE — 93010 ELECTROCARDIOGRAM REPORT: CPT | Performed by: INTERNAL MEDICINE
